# Patient Record
Sex: MALE | Race: WHITE | NOT HISPANIC OR LATINO | Employment: FULL TIME | URBAN - METROPOLITAN AREA
[De-identification: names, ages, dates, MRNs, and addresses within clinical notes are randomized per-mention and may not be internally consistent; named-entity substitution may affect disease eponyms.]

---

## 2017-01-26 ENCOUNTER — OFFICE VISIT (OUTPATIENT)
Dept: INTERNAL MEDICINE | Facility: CLINIC | Age: 47
End: 2017-01-26
Payer: COMMERCIAL

## 2017-01-26 VITALS
RESPIRATION RATE: 16 BRPM | DIASTOLIC BLOOD PRESSURE: 92 MMHG | TEMPERATURE: 97.9 F | HEART RATE: 74 BPM | HEIGHT: 68 IN | WEIGHT: 155 LBS | BODY MASS INDEX: 23.49 KG/M2 | SYSTOLIC BLOOD PRESSURE: 138 MMHG

## 2017-01-26 DIAGNOSIS — I10 ESSENTIAL HYPERTENSION, BENIGN: ICD-10-CM

## 2017-01-26 DIAGNOSIS — Z00.01 ENCOUNTER FOR ROUTINE ADULT MEDICAL EXAM WITH ABNORMAL FINDINGS: Primary | ICD-10-CM

## 2017-01-26 DIAGNOSIS — Z72.0 TOBACCO ABUSE: ICD-10-CM

## 2017-01-26 DIAGNOSIS — F10.21 ALCOHOL DEPENDENCE IN REMISSION (H): ICD-10-CM

## 2017-01-26 DIAGNOSIS — Z13.6 CARDIOVASCULAR SCREENING; LDL GOAL LESS THAN 130: ICD-10-CM

## 2017-01-26 LAB
ANION GAP SERPL CALCULATED.3IONS-SCNC: 8 MMOL/L (ref 3–14)
BUN SERPL-MCNC: 10 MG/DL (ref 7–30)
CALCIUM SERPL-MCNC: 9.9 MG/DL (ref 8.5–10.1)
CHLORIDE SERPL-SCNC: 104 MMOL/L (ref 94–109)
CHOLEST SERPL-MCNC: 232 MG/DL
CO2 SERPL-SCNC: 28 MMOL/L (ref 20–32)
CREAT SERPL-MCNC: 0.82 MG/DL (ref 0.66–1.25)
GFR SERPL CREATININE-BSD FRML MDRD: NORMAL ML/MIN/1.7M2
GLUCOSE SERPL-MCNC: 96 MG/DL (ref 70–99)
HDLC SERPL-MCNC: 53 MG/DL
LDLC SERPL CALC-MCNC: 165 MG/DL
NONHDLC SERPL-MCNC: 179 MG/DL
POTASSIUM SERPL-SCNC: 4.9 MMOL/L (ref 3.4–5.3)
SODIUM SERPL-SCNC: 140 MMOL/L (ref 133–144)
TRIGL SERPL-MCNC: 71 MG/DL

## 2017-01-26 PROCEDURE — 80048 BASIC METABOLIC PNL TOTAL CA: CPT | Performed by: INTERNAL MEDICINE

## 2017-01-26 PROCEDURE — 36415 COLL VENOUS BLD VENIPUNCTURE: CPT | Performed by: INTERNAL MEDICINE

## 2017-01-26 PROCEDURE — 80061 LIPID PANEL: CPT | Performed by: INTERNAL MEDICINE

## 2017-01-26 PROCEDURE — 99213 OFFICE O/P EST LOW 20 MIN: CPT | Mod: 25 | Performed by: INTERNAL MEDICINE

## 2017-01-26 PROCEDURE — 99396 PREV VISIT EST AGE 40-64: CPT | Performed by: INTERNAL MEDICINE

## 2017-01-26 RX ORDER — LISINOPRIL 10 MG/1
10 TABLET ORAL DAILY
Qty: 90 TABLET | Refills: 0 | Status: SHIPPED | OUTPATIENT
Start: 2017-01-26 | End: 2017-06-14

## 2017-01-26 NOTE — PROGRESS NOTES
SUBJECTIVE:     CC: Iain Fuentes is an 46 year old male who presents for preventative health visit.     Healthy Habits:    Do you get at least three servings of calcium containing foods daily (dairy, green leafy vegetables, etc.)? yes and no, taking calcium and/or vitamin D supplement: no    Amount of exercise or daily activities, outside of work: 5 day(s) per week    Problems taking medications regularly No    Medication side effects: No    Have you had an eye exam in the past two years? yes    Do you see a dentist twice per year? no    Do you have sleep apnea, excessive snoring or daytime drowsiness?some snoring        Blood pressure medications - he stopped his lisinopril > 6 mo ago  He no longer drinks though and when he has had this checked his # seem to be borderline but not overtly elevated.     Today's PHQ-2 Score:   PHQ-2 ( 1999 [employer]) 1/26/2017 4/28/2015   Q1: Little interest or pleasure in doing things 0 0   Q2: Feeling down, depressed or hopeless 0 0   PHQ-2 Score 0 0       Abuse: Current or Past(Physical, Sexual or Emotional)- No  Do you feel safe in your environment - Yes    Social History   Substance Use Topics     Smoking status: Current Every Day Smoker -- 1.00 packs/day for 20 years     Types: Cigarettes     Smokeless tobacco: Never Used     Alcohol Use: No     The patient does not drink >3 drinks per day nor >7 drinks per week.    Last PSA: No results found for: PSA    Recent Labs   Lab Test  04/28/15   1128  02/22/13   0947   CHOL  178  213*   HDL  49  55   LDL  115  145*   TRIG  72  65   CHOLHDLRATIO  3.6  3.9       Reviewed orders with patient. Reviewed health maintenance and updated orders accordingly - Yes    All Histories reviewed and updated in Epic.      ROS:  C: NEGATIVE for fever, chills, change in weight  I: NEGATIVE for worrisome rashes, moles or lesions  E: NEGATIVE for vision changes or irritation  ENT: NEGATIVE for ear, mouth and throat problems  R: NEGATIVE for  "significant cough or SOB  CV: NEGATIVE for chest pain, palpitations or peripheral edema  GI: NEGATIVE for nausea, abdominal pain, heartburn, or change in bowel habits   male: negative for dysuria, hematuria, decreased urinary stream, erectile dysfunction, urethral discharge  M: NEGATIVE for significant arthralgias or myalgia  N: NEGATIVE for weakness, dizziness or paresthesias  P: NEGATIVE for changes in mood or affect    Problem list, Medication list, Allergies, and Medical/Social/Surgical histories reviewed in [facility] and updated as appropriate.  OBJECTIVE:     /78 mmHg  Pulse 74  Temp(Src) 97.9  F (36.6  C) (Oral)  Resp 16  Ht 5' 8\" (1.727 m)  Wt 155 lb (70.308 kg)  BMI 23.57 kg/m2  EXAM:  GENERAL: healthy, alert and no distress  EYES: Eyes grossly normal to inspection, PERRL and conjunctivae and sclerae normal  HENT: ear canals and TM's normal, nose and mouth without ulcers or lesions  NECK: no adenopathy, no asymmetry, masses, or scars and thyroid normal to palpation  RESP: lungs clear to auscultation - no rales, rhonchi or wheezes  CV: regular rate and rhythm, normal S1 S2, no S3 or S4, no murmur, click or rub, no peripheral edema and peripheral pulses strong  ABDOMEN: soft, nontender, no hepatosplenomegaly, no masses and bowel sounds normal  MS: no gross musculoskeletal defects noted, no edema  SKIN: scattered acne scars. There is a papilloma like lesion above L eyelid.   NEURO: Normal strength and tone, mentation intact and speech normal  PSYCH: mentation appears normal, affect normal/bright  LYMPH: no cervical, supraclavicular, axillary, or inguinal adenopathy    Results for orders placed or performed in visit on 01/26/17   Basic metabolic panel   Result Value Ref Range    Sodium 140 133 - 144 mmol/L    Potassium 4.9 3.4 - 5.3 mmol/L    Chloride 104 94 - 109 mmol/L    Carbon Dioxide 28 20 - 32 mmol/L    Anion Gap 8 3 - 14 mmol/L    Glucose 96 70 - 99 mg/dL    Urea Nitrogen 10 7 - 30 mg/dL    " "Creatinine 0.82 0.66 - 1.25 mg/dL    GFR Estimate >90  Non  GFR Calc   >60 mL/min/1.7m2    GFR Estimate If Black >90   GFR Calc   >60 mL/min/1.7m2    Calcium 9.9 8.5 - 10.1 mg/dL   Lipid Profile with reflex to direct LDL   Result Value Ref Range    Cholesterol 232 (H) <200 mg/dL    Triglycerides 71 <150 mg/dL    HDL Cholesterol 53 >39 mg/dL    LDL Cholesterol Calculated 165 (H) <100 mg/dL    Non HDL Cholesterol 179 (H) <130 mg/dL      The 10-year ASCVD risk score (Cuauhtemoc LOZADA Jr., et al., 2013) is: 9.2%    Values used to calculate the score:      Age: 46 years      Sex: Male      Is an : No      Diabetic: No      Tobacco smoker: Yes      Systolic Blood Pressure: 138 mmHg      Prescribed Antihypertensives: Yes      HDL Cholesterol: 53 mg/dL      Total Cholesterol: 232 mg/dL     ASSESSMENT/PLAN:     1. Encounter for routine adult medical exam with abnormal findings  Overall seems to be doing better. Focus on tobacco cessation    2. BENIGN HYPERTENSION  Restart medication- try lower dose to start  - lisinopril (PRINIVIL/ZESTRIL) 10 MG tablet; Take 1 tablet (10 mg) by mouth daily  Dispense: 90 tablet; Refill: 0    3. Tobacco abuse  Needs to consider quitting    4. CARDIOVASCULAR SCREENING; LDL GOAL LESS THAN 130  Will discuss risk on f/u - because of his tobacco use his risk is in the 9% range.   - Basic metabolic panel  - Lipid Profile with reflex to direct LDL    5. Alcohol dependence in remission (H)        COUNSELING:  Reviewed preventive health counseling, as reflected in patient instructions         reports that he has been smoking Cigarettes.  He has a 20 pack-year smoking history. He has never used smokeless tobacco.  Tobacco Cessation Action Plan: Information offered: Patient not interested at this time  Estimated body mass index is 23.57 kg/(m^2) as calculated from the following:    Height as of this encounter: 5' 8\" (1.727 m).    Weight as of this encounter: 155 lb " (70.308 kg).       Counseling Resources:  ATP IV Guidelines  Pooled Cohorts Equation Calculator  FRAX Risk Assessment  ICSI Preventive Guidelines  Dietary Guidelines for Americans, 2010  USDA's MyPlate  ASA Prophylaxis  Lung CA Screening    Abdiel Kelly MD  [facility]

## 2017-01-26 NOTE — MR AVS SNAPSHOT
After Visit Summary   1/26/2017    Iain Fuentes    MRN: [id number]           Patient Information     Date Of Birth          1970        Visit Information        Provider Department      1/26/2017 3:00 PM Abdiel Kelly MD [facility]        Today's Diagnoses     Encounter for routine adult medical exam with abnormal findings    -  1     BENIGN HYPERTENSION         Tobacco abuse         CARDIOVASCULAR SCREENING; LDL GOAL LESS THAN 130         Alcohol dependence in remission (H)           Care Instructions      Preventive Health Recommendations  Male Ages 40 to 49    Yearly exam:             See your health care provider every year in order to  o   Review health changes.   o   Discuss preventive care.    o   Review your medicines if your doctor has prescribed any.    You should be tested each year for STDs (sexually transmitted diseases) if you re at risk.     Have a cholesterol test every 5 years.     Have a colonoscopy (test for colon cancer) if someone in your family has had colon cancer or polyps before age 50.     After age 45, have a diabetes test (fasting glucose). If you are at risk for diabetes, you should have this test every 3 years.      Talk with your health care provider about whether or not a prostate cancer screening test (PSA) is right for you.    Shots: Get a flu shot each year. Get a tetanus shot every 10 years.     Nutrition:    Eat at least 5 servings of fruits and vegetables daily.     Eat whole-grain bread, whole-wheat pasta and brown rice instead of white grains and rice.     Talk to your provider about Calcium and Vitamin D.     Lifestyle    Exercise for at least 150 minutes a week (30 minutes a day, 5 days a week). This will help you control your weight and prevent disease.     Limit alcohol to one drink per day.     No smoking.     Wear sunscreen to prevent skin cancer.     See your dentist every six months for an exam and cleaning.       The heart-healthy Mediterranean is a healthy eating plan based on typical foods and recipes of Mediterranean-style cooking. Here's how to adopt the Mediterranean diet.   If you're looking for a heart-healthy eating plan, the Mediterranean diet might be right for you. The Mediterranean diet incorporates the basics of healthy eating -- plus a splash of flavorful olive oil and perhaps even a glass of red wine -- among other components characterizing the traditional cooking style of countries bordering the [place].  Most healthy diets include fruits, vegetables, fish and whole grains, and limit unhealthy fats. While these parts of a healthy diet remain tried-and-true, subtle variations or differences in proportions of certain foods may make a difference in your risk of heart disease.  Research has shown that the traditional Mediterranean diet reduces the risk of heart disease. In fact, an analysis of more than 1.5 million healthy adults demonstrated that following a Mediterranean diet was associated with a reduced risk of death from heart disease and cancer, as well as a reduced incidence of Parkinson's and Alzheimer's diseases.   The Dietary Guidelines for Americans recommends the Mediterranean diet as an eating plan that can help promote health and prevent disease. And the Mediterranean diet is one your whole family can follow for good health.   The Mediterranean diet emphasizes:  Eating primarily plant-based foods, such as fruits and vegetables, whole grains, legumes and nuts   Replacing butter with healthy fats, such as olive oil   Using herbs and spices instead of salt to flavor foods   Limiting red meat to no more than a few times a month   Eating fish and poultry at least twice a week   Drinking red wine in moderation (optional)  The diet also recognizes the importance of being physically active, and enjoying meals with family and friends.  The Mediterranean diet traditionally includes fruits,  "vegetables and grains. For example, residents of [facility] average six or more servings a day of antioxidant-rich fruits and vegetables.  Grains in the Mediterranean region are typically whole grain and usually contain very few unhealthy trans fats, and bread is an important part of the diet. However, throughout the Mediterranean region, bread is eaten plain or dipped in olive oil -- not eaten with butter or margarine, which contains saturated or trans fats.   Nuts are another part of a healthy Mediterranean diet. Nuts are high in fat, but most of the fat is healthy. Because nuts are high in calories, they should not be eaten in large amounts -- generally no more than a handful a day. For the best nutrition, avoid candied or honey-roasted and heavily salted nuts.  The focus of the Mediterranean diet isn't on limiting total fat consumption, but rather on choosing healthier types of fat. The Mediterranean diet discourages saturated fats and hydrogenated oils (trans fats), both of which contribute to heart disease.  The Mediterranean diet features olive oil as the primary source of fat. Olive oil is mainly monounsaturated fat -- a type of fat that can help reduce low-density lipoprotein (LDL) cholesterol levels when used in place of saturated or trans fats. \"Extra-virgin\" and \"virgin\" olive oils (the least processed forms) also contain the highest levels of protective plant compounds that provide antioxidant effects.  Canola oil and some nuts contain the beneficial linolenic acid (a type of omega-3 fatty acid) in addition to healthy unsaturated fat. Omega-3 fatty acids lower triglycerides, decrease blood clotting, and are associated with decreased incidence of sudden heart attacks, improve the health of your blood vessels, and help moderate blood pressure. Fatty fish -- such as mackerel, lake trout, herring, sardines, albacore tuna and salmon -- are rich sources of omega-3 fatty acids. Fish is eaten on a regular basis in " the Mediterranean diet.  The health effects of alcohol have been debated for many years, and some doctors are reluctant to encourage alcohol consumption because of the health consequences of excessive drinking. However, alcohol -- in moderation -- has been associated with a reduced risk of heart disease in some research studies.  The Mediterranean diet typically includes a moderate amount of wine, usually red wine. This means no more than 5 ounces (148 milliliters) of wine daily for women of all ages and men older than age 65 and no more than 10 ounces (296 milliliters) of wine daily for younger men. More than this may increase the risk of health problems, including increased risk of certain types of cancer.   If you're unable to limit your alcohol intake to the amounts defined above, if you have a personal or family history of alcohol abuse, or if you have heart or liver disease, refrain from drinking wine or any other alcohol.  The Mediterranean diet is a delicious and healthy way to eat. Many people who switch to this style of eating say they'll never eat any other way. Here are some specific steps to get you started:   Eat your veggies and fruits -- and switch to whole grains. Avariety of plant foods should make up the majority of your meals. They should be minimally processed -- fresh and whole are best. Include veggies and fruits in every meal and eat them for snacks as well. Switch to whole-grain bread and cereal, and begin to eat more whole-grain rice and pasta products. Keep baby carrots, apples and bananas on hand for quick, satisfying snacks. Fruit salads are a wonderful way to eat a variety of healthy fruit.   Go nuts. Nuts and seeds are good sources of fiber, protein and healthy fats. Keep almonds, cashews, pistachios and walnuts on hand for a quick snack. Choose natural peanut butter, rather than the kind with hydrogenated fat added. Try blended sesame seeds (tahini) as a dip or spread for bread.    Pass on the butter. Try olive or canola oil as a healthy replacement for butter or margarine. Lightly drizzle it over vegetables. After cooking pasta, add a touch of olive oil, some garlic and green onions for flavoring. Dip bread in flavored olive oil or lightly spread it on whole-grain bread for a tasty alternative to butter. Try tahini as a dip or spread for bread too.   Spice it up. Herbs and spices make food tasty and can  for salt and fat in recipes.   Go fish. Eat fish at least twice a week. Fresh or water-packed tuna, salmon, trout, mackerel and herring are healthy choices. [place], bake or broil fish for great taste and easy cleanup. Avoid breaded and fried fish.   Rein in the red meat. Limit red meat to no more than a few times a month. Substitute fish and poultry for red meat. When choosing red meat, make sure it's lean and keep portions small (about the size of a deck of cards). Also avoid sausage, neil and other high-fat, processed meats.   Choose low-fat dairy. Limit higher fat dairy products, such as whole or 2 percent milk, cheese and ice cream. Switch to skim milk, fat-free yogurt and low-fat cheese          Follow-ups after your visit        Who to contact     If you have questions or need follow up information about today's clinic visit or your schedule please contact [facility] directly at [phone].  Normal or non-critical lab and imaging results will be communicated to you by MyChart, letter or phone within 4 business days after the clinic has received the results. If you do not hear from us within 7 days, please contact the clinic through [employer] or phone. If you have a critical or abnormal lab result, we will notify you by phone as soon as possible.  Submit refill requests through [employer] or call your pharmacy and they will forward the refill request to us. Please allow 3 business days for your refill to be completed.          Additional Information About  "Your Visit        [employer] Information     [employer] lets you send messages to your doctor, view your test results, renew your prescriptions, schedule appointments and more. To sign up, go to [email or web address] . Click on \"Log in\" on the left side of the screen, which will take you to the Welcome page. Then click on \"Sign up Now\" on the right side of the page.     You will be asked to enter the access code listed below, as well as some personal information. Please follow the directions to create your username and password.     Your access code is: [email or web address]  Expires: 2017  3:34 PM     Your access code will  in 90 days. If you need help or a new code, please call your [facility] clinic or [phone].        Care EveryWhere ID     This is your Care EveryWhere ID. This could be used by other organizations to access your [facility] medical records  [id number]        Your Vitals Were     Pulse Temperature Respirations Height BMI (Body Mass Index)       74 97.9  F (36.6  C) (Oral) 16 5' 8\" (1.727 m) 23.57 kg/m2        Blood Pressure from Last 3 Encounters:   17 138/92   04/28/15 130/82   02/13/15 152/100    Weight from Last 3 Encounters:   17 155 lb (70.308 kg)   04/28/15 155 lb (70.308 kg)   02/13/15 158 lb 11.2 oz (71.986 kg)              We Performed the Following     Basic metabolic panel     Lipid Profile with reflex to direct LDL          Today's Medication Changes          These changes are accurate as of: 17  3:34 PM.  If you have any questions, ask your nurse or doctor.               These medicines have changed or have updated prescriptions.        Dose/Directions    lisinopril 10 MG tablet   Commonly known as:  PRINIVIL/ZESTRIL   This may have changed:    - medication strength  - how much to take   Used for:  Essential hypertension, benign   Changed by:  Abdiel Kelly MD        Dose:  10 mg   Take 1 tablet (10 mg) by mouth daily   Quantity:  90 tablet   Refills:  0       "      Where to get your medicines      These medications were sent to [id number] - [place], [place] - [address]  [address], [facility] [address]     Phone:  [phone]    - lisinopril 10 MG tablet             Primary Care Provider Office Phone # Fax #    Abdiel Kelly [phone]       [facility] [address]  [facility] [address]        Thank you!     Thank you for choosing [facility]  for your care. Our goal is always to provide you with excellent care. Hearing back from our patients is one way we can continue to improve our services. Please take a few minutes to complete the written survey that you may receive in the mail after your visit with us. Thank you!             Your Updated Medication List - Protect others around you: Learn how to safely use, store and throw away your medicines at www.disposemymeds.org.          This list is accurate as of: 1/26/17  3:34 PM.  Always use your most recent med list.                   Brand Name Dispense Instructions for use    lisinopril 10 MG tablet    PRINIVIL/ZESTRIL    90 tablet    Take 1 tablet (10 mg) by mouth daily

## 2017-01-26 NOTE — PATIENT INSTRUCTIONS
Preventive Health Recommendations  Male Ages 40 to 49    Yearly exam:             See your health care provider every year in order to  o   Review health changes.   o   Discuss preventive care.    o   Review your medicines if your doctor has prescribed any.    You should be tested each year for STDs (sexually transmitted diseases) if you re at risk.     Have a cholesterol test every 5 years.     Have a colonoscopy (test for colon cancer) if someone in your family has had colon cancer or polyps before age 50.     After age 45, have a diabetes test (fasting glucose). If you are at risk for diabetes, you should have this test every 3 years.      Talk with your health care provider about whether or not a prostate cancer screening test (PSA) is right for you.    Shots: Get a flu shot each year. Get a tetanus shot every 10 years.     Nutrition:    Eat at least 5 servings of fruits and vegetables daily.     Eat whole-grain bread, whole-wheat pasta and brown rice instead of white grains and rice.     Talk to your provider about Calcium and Vitamin D.     Lifestyle    Exercise for at least 150 minutes a week (30 minutes a day, 5 days a week). This will help you control your weight and prevent disease.     Limit alcohol to one drink per day.     No smoking.     Wear sunscreen to prevent skin cancer.     See your dentist every six months for an exam and cleaning.      The heart-healthy Mediterranean is a healthy eating plan based on typical foods and recipes of Mediterranean-style cooking. Here's how to adopt the Mediterranean diet.   If you're looking for a heart-healthy eating plan, the Mediterranean diet might be right for you. The Mediterranean diet incorporates the basics of healthy eating   plus a splash of flavorful olive oil and perhaps even a glass of red wine   among other components characterizing the traditional cooking style of countries bordering the [place].  Most healthy diets include fruits,  vegetables, fish and whole grains, and limit unhealthy fats. While these parts of a healthy diet remain tried-and-true, subtle variations or differences in proportions of certain foods may make a difference in your risk of heart disease.  Research has shown that the traditional Mediterranean diet reduces the risk of heart disease. In fact, an analysis of more than 1.5 million healthy adults demonstrated that following a Mediterranean diet was associated with a reduced risk of death from heart disease and cancer, as well as a reduced incidence of Parkinson's and Alzheimer's diseases.   The Dietary Guidelines for Americans recommends the Mediterranean diet as an eating plan that can help promote health and prevent disease. And the Mediterranean diet is one your whole family can follow for good health.   The Mediterranean diet emphasizes:  Eating primarily plant-based foods, such as fruits and vegetables, whole grains, legumes and nuts   Replacing butter with healthy fats, such as olive oil   Using herbs and spices instead of salt to flavor foods   Limiting red meat to no more than a few times a month   Eating fish and poultry at least twice a week   Drinking red wine in moderation (optional)  The diet also recognizes the importance of being physically active, and enjoying meals with family and friends.  The Mediterranean diet traditionally includes fruits, vegetables and grains. For example, residents of [facility] average six or more servings a day of antioxidant-rich fruits and vegetables.  Grains in the Mediterranean region are typically whole grain and usually contain very few unhealthy trans fats, and bread is an important part of the diet. However, throughout the Mediterranean region, bread is eaten plain or dipped in olive oil   not eaten with butter or margarine, which contains saturated or trans fats.   Nuts are another part of a healthy Mediterranean diet. Nuts are high in fat, but most of the fat is healthy.  "Because nuts are high in calories, they should not be eaten in large amounts   generally no more than a handful a day. For the best nutrition, avoid candied or honey-roasted and heavily salted nuts.  The focus of the Mediterranean diet isn't on limiting total fat consumption, but rather on choosing healthier types of fat. The Mediterranean diet discourages saturated fats and hydrogenated oils (trans fats), both of which contribute to heart disease.  The Mediterranean diet features olive oil as the primary source of fat. Olive oil is mainly monounsaturated fat   a type of fat that can help reduce low-density lipoprotein (LDL) cholesterol levels when used in place of saturated or trans fats. \"Extra-virgin\" and \"virgin\" olive oils (the least processed forms) also contain the highest levels of protective plant compounds that provide antioxidant effects.  Canola oil and some nuts contain the beneficial linolenic acid (a type of omega-3 fatty acid) in addition to healthy unsaturated fat. Omega-3 fatty acids lower triglycerides, decrease blood clotting, and are associated with decreased incidence of sudden heart attacks, improve the health of your blood vessels, and help moderate blood pressure. Fatty fish   such as mackerel, lake trout, herring, sardines, albacore tuna and salmon   are rich sources of omega-3 fatty acids. Fish is eaten on a regular basis in the Mediterranean diet.  The health effects of alcohol have been debated for many years, and some doctors are reluctant to encourage alcohol consumption because of the health consequences of excessive drinking. However, alcohol   in moderation   has been associated with a reduced risk of heart disease in some research studies.  The Mediterranean diet typically includes a moderate amount of wine, usually red wine. This means no more than 5 ounces (148 milliliters) of wine daily for women of all ages and men older than age 65 and no more than 10 ounces (296 milliliters) " of wine daily for younger men. More than this may increase the risk of health problems, including increased risk of certain types of cancer.   If you're unable to limit your alcohol intake to the amounts defined above, if you have a personal or family history of alcohol abuse, or if you have heart or liver disease, refrain from drinking wine or any other alcohol.  The Mediterranean diet is a delicious and healthy way to eat. Many people who switch to this style of eating say they'll never eat any other way. Here are some specific steps to get you started:   Eat your veggies and fruits   and switch to whole grains. [place] of plant foods should make up the majority of your meals. They should be minimally processed   fresh and whole are best. Include veggies and fruits in every meal and eat them for snacks as well. Switch to whole-grain bread and cereal, and begin to eat more whole-grain rice and pasta products. Keep baby carrots, apples and bananas on hand for quick, satisfying snacks. Fruit salads are a wonderful way to eat a variety of healthy fruit.   Go nuts. Nuts and seeds are good sources of fiber, protein and healthy fats. Keep almonds, cashews, pistachios and walnuts on hand for a quick snack. Choose natural peanut butter, rather than the kind with hydrogenated fat added. Try blended sesame seeds (tahini) as a dip or spread for bread.   Pass on the butter. Try olive or canola oil as a healthy replacement for butter or margarine. Lightly drizzle it over vegetables. After cooking pasta, add a touch of olive oil, some garlic and green onions for flavoring. Dip bread in flavored olive oil or lightly spread it on whole-grain bread for a tasty alternative to butter. Try tahini as a dip or spread for bread too.   Spice it up. Herbs and spices make food tasty and can  for salt and fat in recipes.   Go fish. Eat fish at least twice a week. Fresh or water-packed tuna, salmon, trout, mackerel and herring are  healthy choices. [place], bake or broil fish for great taste and easy cleanup. Avoid breaded and fried fish.   Rein in the red meat. Limit red meat to no more than a few times a month. Substitute fish and poultry for red meat. When choosing red meat, make sure it's lean and keep portions small (about the size of a deck of cards). Also avoid sausage, neil and other high-fat, processed meats.   Choose low-fat dairy. Limit higher fat dairy products, such as whole or 2 percent milk, cheese and ice cream. Switch to skim milk, fat-free yogurt and low-fat cheese

## 2017-01-26 NOTE — NURSING NOTE
"Chief Complaint   Patient presents with     Physical       Initial /78 mmHg  Pulse 74  Temp(Src) 97.9  F (36.6  C) (Oral)  Resp 16  Ht 5' 8\" (1.727 m)  Wt 155 lb (70.308 kg)  BMI 23.57 kg/m2 Estimated body mass index is 23.57 kg/(m^2) as calculated from the following:    Height as of this encounter: 5' 8\" (1.727 m).    Weight as of this encounter: 155 lb (70.308 kg).  BP completed using cuff size: zabrina Noyola MA      "

## 2017-06-14 ENCOUNTER — OFFICE VISIT (OUTPATIENT)
Dept: INTERNAL MEDICINE | Facility: CLINIC | Age: 47
End: 2017-06-14
Payer: COMMERCIAL

## 2017-06-14 VITALS
SYSTOLIC BLOOD PRESSURE: 130 MMHG | WEIGHT: 152 LBS | OXYGEN SATURATION: 97 % | DIASTOLIC BLOOD PRESSURE: 82 MMHG | BODY MASS INDEX: 23.11 KG/M2 | TEMPERATURE: 97.8 F | HEART RATE: 73 BPM

## 2017-06-14 DIAGNOSIS — N52.9 IMPOTENCE OF ORGANIC ORIGIN: ICD-10-CM

## 2017-06-14 DIAGNOSIS — I10 ESSENTIAL HYPERTENSION, BENIGN: Primary | ICD-10-CM

## 2017-06-14 DIAGNOSIS — Z72.0 TOBACCO ABUSE: ICD-10-CM

## 2017-06-14 PROCEDURE — 99214 OFFICE O/P EST MOD 30 MIN: CPT | Performed by: INTERNAL MEDICINE

## 2017-06-14 RX ORDER — TADALAFIL 20 MG/1
10-20 TABLET ORAL DAILY PRN
Qty: 6 TABLET | Refills: 11 | Status: SHIPPED | OUTPATIENT
Start: 2017-06-14 | End: 2017-06-14

## 2017-06-14 RX ORDER — TADALAFIL 20 MG/1
10-20 TABLET ORAL DAILY PRN
Qty: 6 TABLET | Refills: 11 | Status: SHIPPED | OUTPATIENT
Start: 2017-06-14 | End: 2018-08-06

## 2017-06-14 NOTE — NURSING NOTE
"Chief Complaint   Patient presents with     Hypertension       Initial /90  Pulse 73  Temp 97.8  F (36.6  C) (Oral)  Wt 152 lb (68.9 kg)  SpO2 97%  BMI 23.11 kg/m2 Estimated body mass index is 23.11 kg/(m^2) as calculated from the following:    Height as of 1/26/17: 5' 8\" (1.727 m).    Weight as of this encounter: 152 lb (68.9 kg).  Medication Reconciliation: complete     Diane Melissa, DINESH       "

## 2017-06-14 NOTE — MR AVS SNAPSHOT
"              After Visit Summary   2017    Iain Fuentes    MRN: [id number]           Patient Information     Date Of Birth          1970        Visit Information        Provider Department      2017 7:40 AM Abdiel Kelly MD [facility]        Today's Diagnoses     Essential hypertension, benign    -  1    Tobacco abuse        Impotence of organic origin           Follow-ups after your visit        Who to contact     If you have questions or need follow up information about today's clinic visit or your schedule please contact [facility] directly at [phone].  Normal or non-critical lab and imaging results will be communicated to you by MyChart, letter or phone within 4 business days after the clinic has received the results. If you do not hear from us within 7 days, please contact the clinic through [employer] or phone. If you have a critical or abnormal lab result, we will notify you by phone as soon as possible.  Submit refill requests through [employer] or call your pharmacy and they will forward the refill request to us. Please allow 3 business days for your refill to be completed.          Additional Information About Your Visit        MyChart Information     [employer] lets you send messages to your doctor, view your test results, renew your prescriptions, schedule appointments and more. To sign up, go to [email or web address] . Click on \"Log in\" on the left side of the screen, which will take you to the Welcome page. Then click on \"Sign up Now\" on the right side of the page.     You will be asked to enter the access code listed below, as well as some personal information. Please follow the directions to create your username and password.     Your access code is: [email or web address]  Expires: 2017  8:23 AM     Your access code will  in 90 days. If you need help or a new code, please call your [facility] or [phone].      "   Care EveryWhere ID     This is your Care EveryWhere ID. This could be used by other organizations to access your [facility] medical records  [id number]        Your Vitals Were     Pulse Temperature Pulse Oximetry BMI (Body Mass Index)          73 97.8  F (36.6  C) (Oral) 97% 23.11 kg/m2         Blood Pressure from Last 3 Encounters:   06/14/17 130/82   01/26/17 (!) 138/92   04/28/15 130/82    Weight from Last 3 Encounters:   06/14/17 152 lb (68.9 kg)   01/26/17 155 lb (70.3 kg)   04/28/15 155 lb (70.3 kg)              Today, you had the following     No orders found for display         Today's Medication Changes          These changes are accurate as of: 6/14/17  8:23 AM.  If you have any questions, ask your nurse or doctor.               Start taking these medicines.        Dose/Directions    tadalafil 20 MG tablet   Commonly known as:  CIALIS   Used for:  Impotence of organic origin   Started by:  Abdiel Kelly MD        Dose:  10-20 mg   Take 0.5-1 tablets (10-20 mg) by mouth daily as needed for erectile dysfunction Never use with nitroglycerin, terazosin or doxazosin.   Quantity:  6 tablet   Refills:  11         Stop taking these medicines if you haven't already. Please contact your care team if you have questions.     lisinopril 10 MG tablet   Commonly known as:  PRINIVIL/ZESTRIL   Stopped by:  Abdiel Kelly MD                Where to get your medicines      These medications were sent to [id number] - [facility][1] [address]   [address] [facility] [address]     Phone:  [phone]     tadalafil 20 MG tablet                Primary Care Provider Office Phone # Fax #    Abdiel Kelly [phone]       [facility] [address]  [facility] [address]        Thank you!     Thank you for choosing [facility]  for your care. Our goal is always to provide you with excellent care. Hearing back from our patients  is one way we can continue to improve our services. Please take a few minutes to complete the written survey that you may receive in the mail after your visit with us. Thank you!             Your Updated Medication List - Protect others around you: Learn how to safely use, store and throw away your medicines at www.disposemymeds.org.          This list is accurate as of: 6/14/17  8:23 AM.  Always use your most recent med list.                   Brand Name Dispense Instructions for use    tadalafil 20 MG tablet    CIALIS    6 tablet    Take 0.5-1 tablets (10-20 mg) by mouth daily as needed for erectile dysfunction Never use with nitroglycerin, terazosin or doxazosin.

## 2017-06-14 NOTE — PROGRESS NOTES
SUBJECTIVE:                                                    Iain Fuentes is a 46 year old male who presents to clinic today for the following health issues:    Hasn't been taking his mediation for past 4-8 weeks  Also remains sober.   Hypertension Follow-up  BP Readings from Last 3 Encounters:   06/14/17 142/90   01/26/17 (!) 138/92   04/28/15 130/82        Outpatient blood pressures are not being checked.    Low Salt Diet: no added salt       Amount of exercise or physical activity: 2-3 days/week for an average of 30-45 minutes    Problems taking medications regularly: No    Medication side effects: erectile dysfunction     Diet: regular (no restrictions)    Impotence    Problem list and histories reviewed & adjusted, as indicated.  Additional history: as documented    Labs reviewed in EPIC    Reviewed and updated as needed this visit by clinical staff       Reviewed and updated as needed this visit by Provider            ROS:  Constitutional, HEENT, cardiovascular, pulmonary, gi and gu systems are negative, except as otherwise noted.    OBJECTIVE:                                                    /82  Pulse 73  Temp 97.8  F (36.6  C) (Oral)  Wt 152 lb (68.9 kg)  SpO2 97%  BMI 23.11 kg/m2  Body mass index is 23.11 kg/(m^2).  GENERAL APPEARANCE: alert and no distress  RESP: lungs clear to auscultation - no rales, rhonchi or wheezes  CV: regular rates and rhythm, normal S1 S2, no S3 or S4 and no murmur, click or rub    Diagnostic test results:  none      ASSESSMENT/PLAN:                                                    1. Essential hypertension, benign  Based on # here he may in fact not need medication now that he has remained sober  Will monitor off meds- recheck in pharmacy monthly.     2. Impotence of organic origin  - tadalafil (CIALIS) 20 MG tablet; Take 0.5-1 tablets (10-20 mg) by mouth daily as needed for erectile dysfunction Never use with nitroglycerin, terazosin or doxazosin.   Dispense: 6 tablet; Refill: 11      Follow up with Provider - as above     Abdiel Kelly MD  [facility]

## 2018-01-29 ENCOUNTER — OFFICE VISIT (OUTPATIENT)
Dept: URGENT CARE | Facility: URGENT CARE | Age: 48
End: 2018-01-29
Payer: OTHER MISCELLANEOUS

## 2018-01-29 ENCOUNTER — RADIANT APPOINTMENT (OUTPATIENT)
Dept: GENERAL RADIOLOGY | Facility: CLINIC | Age: 48
End: 2018-01-29
Attending: PHYSICIAN ASSISTANT
Payer: OTHER MISCELLANEOUS

## 2018-01-29 VITALS
WEIGHT: 157.2 LBS | BODY MASS INDEX: 23.9 KG/M2 | DIASTOLIC BLOOD PRESSURE: 104 MMHG | SYSTOLIC BLOOD PRESSURE: 169 MMHG | RESPIRATION RATE: 18 BRPM | TEMPERATURE: 97 F | HEART RATE: 64 BPM

## 2018-01-29 DIAGNOSIS — Z87.39 HISTORY OF HERNIATED INTERVERTEBRAL DISC: ICD-10-CM

## 2018-01-29 DIAGNOSIS — M54.6 ACUTE BILATERAL THORACIC BACK PAIN: ICD-10-CM

## 2018-01-29 DIAGNOSIS — M62.830 BACK MUSCLE SPASM: ICD-10-CM

## 2018-01-29 DIAGNOSIS — M54.6 ACUTE BILATERAL THORACIC BACK PAIN: Primary | ICD-10-CM

## 2018-01-29 PROCEDURE — 72070 X-RAY EXAM THORAC SPINE 2VWS: CPT | Mod: FY

## 2018-01-29 PROCEDURE — 99214 OFFICE O/P EST MOD 30 MIN: CPT | Performed by: PHYSICIAN ASSISTANT

## 2018-01-29 RX ORDER — METHYLPREDNISOLONE 4 MG
TABLET, DOSE PACK ORAL
Qty: 21 TABLET | Refills: 0 | Status: SHIPPED | OUTPATIENT
Start: 2018-01-29 | End: 2018-06-01

## 2018-01-29 RX ORDER — METHOCARBAMOL 750 MG/1
750 TABLET, FILM COATED ORAL 3 TIMES DAILY PRN
Qty: 30 TABLET | Refills: 0 | Status: SHIPPED | OUTPATIENT
Start: 2018-01-29 | End: 2018-06-01

## 2018-01-29 NOTE — PROGRESS NOTES
SUBJECTIVE  HPI: Iain Fuentes is a 47 year old male who presents for evaluation of back pain, mid to lower  Symptoms began today  ago, have been onset gradual and are worse.  Pain is located in the middle of back bilateral region, with radiation to does not radiate, and are at worst a 4 on a scale of 1-10.  Recent injury:turning/bending   Personal hx of back pain is previous herniated disc.  Pain is exacerbated by: certain movements.  Pain is relieved by: rest   Associated sx include: spasms.  Red flag symptoms: negative, fever, chills, night sweats.    Past Medical History:   Diagnosis Date     Alcohol abuse      Hypertension      Lumbar herniated disc      Marijuana dependence (H)      ALLERGIES  No Known Allergies     Social History   Substance Use Topics     Smoking status: Current Every Day Smoker     Packs/day: 1.00     Years: 20.00     Types: Cigarettes     Smokeless tobacco: Never Used     Alcohol use No       ROS:  CONSTITUTIONAL:NEGATIVE for fever, chills, change in weight  INTEGUMENTARY/SKIN: NEGATIVE for worrisome rashes, moles or lesions  RESP: NEGATIVE for SOB  CV: NEGATIVE for chest pain, pressure  EXT: NEGATIVE for radicular pain into legs   MUSCULOSKELETAL: POSITIVE  for mid to lower back pain, tenderness, spasms  NEURO: NEGATIVE for weakness, dizziness or paresthesias    OBJECTIVE:  BP (!) 169/104  Pulse 64  Temp 97  F (36.1  C) (Oral)  Resp 18  Wt 157 lb 3.2 oz (71.3 kg)  BMI 23.9 kg/m2  Back examination: Back symmetric, no curvature. ROM normal. No CVA tenderness., positive findings: paraspinal muscle spasm, tenderness to palpation mid to lower spine  STRAIGHT leg raise test: negative  GENERAL APPEARANCE: healthy, alert and no distress  RESP: lungs clear to auscultation - no rales, rhonchi or wheezes  CV: regular rates and rhythm, normal S1 S2, no murmur noted  ABDOMEN:  soft, nontender, no HSM or masses and bowel sounds normal  NEURO: Normal strength and tone with no weakness or  sensory deficit noted, reflexes normal   Extremities: no peripheral edema or tenderness, peripheral pulses normal  MS:  Positive for mid to lower back pain, spasms   SKIN: no suspicious lesions or rashes    Thoracic xray Negative for acute findings, read by Gucci AHUJA at time of visit.    ASSESSMENT/IMPRESSION/PLAN:    ICD-10-CM    1. Acute bilateral thoracic back pain M54.6 methylPREDNISolone (MEDROL DOSEPAK) 4 MG tablet     CANCELED: XR Thoracic Spine G/E 4 Views   2. Back muscle spasm M62.830 methocarbamol (ROBAXIN) 750 MG tablet   3. History of herniated intervertebral disc Z87.39        EDUCATION      1.  Continue stretching and strengthening exercises.       2.  Continue prn heat or ice application.    Orders Placed This Encounter     Ibuprofen (ADVIL PO)     methocarbamol (ROBAXIN) 750 MG tablet     methylPREDNISolone (MEDROL DOSEPAK) 4 MG tablet

## 2018-01-29 NOTE — LETTER
[address] [address]  [phone]        2018    REPORT OF WORK ABILITY    PATIENT DATA  Employee Name: Iain Fuentes        : 1970   [id number]  Work related injury: YES  Today's date: 2018  Date of injury: 2018     PROVIDER EVALUATION: Please fill in as needed.  Please give copy to employee for employer.  1. Diagnosis: mid back pain, strain, spasms  2. Treatment: medication, ice, ROM exercises  3. Medication: medrol and robaxin  NOTE: When ordering a medication, [place] Rules require Work Comp or WC on prescriptions.  4. Return to work date: May return today with no restrictions.      RESTRICTIONS: Unlimited unless listed.  Restrictions apply to home and leisure also.  If work within restrictions is not available, the employee is totally disabled.  Provider comments:   Medical Examiner: Gucci Fragoso [facility]  License or registration:     Next appointment: As needed    CC: Employer, Managed Care Plan/Payor, Patient

## 2018-01-29 NOTE — MR AVS SNAPSHOT
"              After Visit Summary   2018    Iain Fuentes    MRN: [id number]           Patient Information     Date Of Birth          1970        Visit Information        Provider Department      2018 10:55 AM Gucci Fragoso PA-C [facility]        Today's Diagnoses     Acute bilateral thoracic back pain    -  1    Back muscle spasm        History of herniated intervertebral disc           Follow-ups after your visit        Who to contact     If you have questions or need follow up information about today's clinic visit or your schedule please contact [facility] directly at [phone].  Normal or non-critical lab and imaging results will be communicated to you by [employer], letter or phone within 4 business days after the clinic has received the results. If you do not hear from us within 7 days, please contact the clinic through [employer] or phone. If you have a critical or abnormal lab result, we will notify you by phone as soon as possible.  Submit refill requests through [employer] or call your pharmacy and they will forward the refill request to us. Please allow 3 business days for your refill to be completed.          Additional Information About Your Visit        MyChart Information     [employer]ctin lets you send messages to your doctor, view your test results, renew your prescriptions, schedule appointments and more. To sign up, go to [email or web address] . Click on \"Log in\" on the left side of the screen, which will take you to the Welcome page. Then click on \"Sign up Now\" on the right side of the page.     You will be asked to enter the access code listed below, as well as some personal information. Please follow the directions to create your username and password.     Your access code is: [email or web address]  Expires: 2018 12:38 PM     Your access code will  in 90 days. If you need help or a new code, please call your [place] " clinic or [phone].        Care EveryWhere ID     This is your Care EveryWhere ID. This could be used by other organizations to access your [facility] medical records  [id number]        Your Vitals Were     Pulse Temperature Respirations BMI (Body Mass Index)          64 97  F (36.1  C) (Oral) 18 23.9 kg/m2         Blood Pressure from Last 3 Encounters:   01/29/18 (!) 169/104   06/14/17 130/82   01/26/17 (!) 138/92    Weight from Last 3 Encounters:   01/29/18 157 lb 3.2 oz (71.3 kg)   06/14/17 152 lb (68.9 kg)   01/26/17 155 lb (70.3 kg)              Today, you had the following     No orders found for display         Today's Medication Changes          These changes are accurate as of 1/29/18 12:38 PM.  If you have any questions, ask your nurse or doctor.               Start taking these medicines.        Dose/Directions    methocarbamol 750 MG tablet   Commonly known as:  ROBAXIN   Used for:  Back muscle spasm   Started by:  Gucci Fragoso PA-C        Dose:  750 mg   Take 1 tablet (750 mg) by mouth 3 times daily as needed for muscle spasms   Quantity:  30 tablet   Refills:  0       methylPREDNISolone 4 MG tablet   Commonly known as:  MEDROL DOSEPAK   Used for:  Acute bilateral thoracic back pain   Started by:  Gucci Fragoso PA-C        Follow package instructions   Quantity:  21 tablet   Refills:  0            Where to get your medicines      These medications were sent to [facility] [address] [address]     Phone:  [phone]     methocarbamol 750 MG tablet    methylPREDNISolone 4 MG tablet                Primary Care Provider Office Phone # Fax #    Abdiel Kelly [phone]       [address] [place] [address]        Equal Access to Services     DULCE KOWALSKI AH: Chris chance Sosabrina, waaxda luqadaha, qaybta kaalmada adeegyaarjun, alba abbasi. So [facility]  [phone].    ATENCIÓN: Si aguilar bone, tiene a reyes disposición servicios gratuitos de asistencia lingüística. Pavan valentine [phone].    We comply with applicable federal civil rights laws and [place] laws. We do not discriminate on the basis of race, color, national origin, age, disability, sex, sexual orientation, or gender identity.            Thank you!     Thank you for choosing [facility]  for your care. Our goal is always to provide you with excellent care. Hearing back from our patients is one way we can continue to improve our services. Please take a few minutes to complete the written survey that you may receive in the mail after your visit with us. Thank you!             Your Updated Medication List - Protect others around you: Learn how to safely use, store and throw away your medicines at www.disposemymeds.org.          This list is accurate as of 1/29/18 12:38 PM.  Always use your most recent med list.                   Brand Name Dispense Instructions for use Diagnosis    ADVIL PO           methocarbamol 750 MG tablet    ROBAXIN    30 tablet    Take 1 tablet (750 mg) by mouth 3 times daily as needed for muscle spasms    Back muscle spasm       methylPREDNISolone 4 MG tablet    MEDROL DOSEPAK    21 tablet    Follow package instructions    Acute bilateral thoracic back pain       tadalafil 20 MG tablet    CIALIS    6 tablet    Take 0.5-1 tablets (10-20 mg) by mouth daily as needed for erectile dysfunction Never use with nitroglycerin, terazosin or doxazosin.    Impotence of organic origin

## 2018-06-01 ENCOUNTER — OFFICE VISIT (OUTPATIENT)
Dept: INTERNAL MEDICINE | Facility: CLINIC | Age: 48
End: 2018-06-01
Payer: COMMERCIAL

## 2018-06-01 VITALS
WEIGHT: 152.5 LBS | BODY MASS INDEX: 23.11 KG/M2 | RESPIRATION RATE: 16 BRPM | SYSTOLIC BLOOD PRESSURE: 138 MMHG | TEMPERATURE: 98 F | HEART RATE: 76 BPM | DIASTOLIC BLOOD PRESSURE: 100 MMHG | HEIGHT: 68 IN

## 2018-06-01 DIAGNOSIS — I10 BENIGN ESSENTIAL HYPERTENSION: Primary | ICD-10-CM

## 2018-06-01 PROCEDURE — 99214 OFFICE O/P EST MOD 30 MIN: CPT | Performed by: PHYSICIAN ASSISTANT

## 2018-06-01 RX ORDER — LISINOPRIL 10 MG/1
10 TABLET ORAL DAILY
Qty: 30 TABLET | Refills: 1 | Status: SHIPPED | OUTPATIENT
Start: 2018-06-01 | End: 2018-08-06

## 2018-06-01 ASSESSMENT — PAIN SCALES - GENERAL: PAINLEVEL: NO PAIN (0)

## 2018-06-01 NOTE — MR AVS SNAPSHOT
"              After Visit Summary   6/1/2018    Iain Fuentes    MRN: [id number]           Patient Information     Date Of Birth          1970        Visit Information        Provider Department      6/1/2018 3:40 PM Melanie Denney PA-C [facility]        Today's Diagnoses     Benign essential hypertension    -  1      Care Instructions    Check blood pressure a few times a week and bring with to appointment with Dr Kelly.     Option to have pharmacist at [facility] pharmacy check your blood pressure too.               Follow-ups after your visit        Who to contact     If you have questions or need follow up information about today's clinic visit or your schedule please contact [facility] directly at [phone].  Normal or non-critical lab and imaging results will be communicated to you by MyChart, letter or phone within 4 business days after the clinic has received the results. If you do not hear from us within 7 days, please contact the clinic through MyChart or phone. If you have a critical or abnormal lab result, we will notify you by phone as soon as possible.  Submit refill requests through [employer] or call your pharmacy and they will forward the refill request to us. Please allow 3 business days for your refill to be completed.          Additional Information About Your Visit        Care EveryWhere ID     This is your Care EveryWhere ID. This could be used by other organizations to access your [facility] medical records  [id number]        Your Vitals Were     Pulse Temperature Respirations Height BMI (Body Mass Index)       76 98  F (36.7  C) (Oral) 16 5' 8\" (1.727 m) 23.19 kg/m2        Blood Pressure from Last 3 Encounters:   06/01/18 (!) 138/100   01/29/18 (!) 169/104   06/14/17 130/82    Weight from Last 3 Encounters:   06/01/18 152 lb 8 oz (69.2 kg)   01/29/18 157 lb 3.2 oz (71.3 kg)   06/14/17 152 lb (68.9 kg)              Today, you " had the following     No orders found for display         Today's Medication Changes          These changes are accurate as of 6/1/18  3:57 PM.  If you have any questions, ask your nurse or doctor.               Start taking these medicines.        Dose/Directions    lisinopril 10 MG tablet   Commonly known as:  PRINIVIL/ZESTRIL   Used for:  Benign essential hypertension   Started by:  Melanie Denney PA-C        Dose:  10 mg   Take 1 tablet (10 mg) by mouth daily   Quantity:  30 tablet   Refills:  1         Stop taking these medicines if you haven't already. Please contact your care team if you have questions.     methocarbamol 750 MG tablet   Commonly known as:  ROBAXIN   Stopped by:  Melanie Denney PA-C           methylPREDNISolone 4 MG tablet   Commonly known as:  MEDROL DOSEPAK   Stopped by:  Melanie Denney PA-C                Where to get your medicines      These medications were sent to [facility] [address] [place] [address] [address] AT [facility] [address]  [address], [facility] [address]     Phone:  [phone]     lisinopril 10 MG tablet                Primary Care Provider Office Phone # Fax #    Abdiel Kelly [phone]       [address] [facility] [address]        Equal Access to Services     DULCE KOWALSKI AH: Hadii aad ku hadasho Soomaali, waaxda luqadaha, qaybta kaalmada adeegyada, alba abbasi. So [facility] [phone].    ATENCIÓN: Si habla español, tiene a reyes disposición servicios gratuitos de asistencia lingüística. Llmichelle al [phone].    We comply with applicable federal civil rights laws and [place] laws. We do not discriminate on the basis of race, color, national origin, age, disability, sex, sexual orientation, or gender identity.            Thank you!     Thank you for choosing [facility]  for your care. Our goal is always to provide  you with excellent care. Hearing back from our patients is one way we can continue to improve our services. Please take a few minutes to complete the written survey that you may receive in the mail after your visit with us. Thank you!             Your Updated Medication List - Protect others around you: Learn how to safely use, store and throw away your medicines at www.disposemymeds.org.          This list is accurate as of 6/1/18  3:57 PM.  Always use your most recent med list.                   Brand Name Dispense Instructions for use Diagnosis    ADVIL PO           lisinopril 10 MG tablet    PRINIVIL/ZESTRIL    30 tablet    Take 1 tablet (10 mg) by mouth daily    Benign essential hypertension       tadalafil 20 MG tablet    CIALIS    6 tablet    Take 0.5-1 tablets (10-20 mg) by mouth daily as needed for erectile dysfunction Never use with nitroglycerin, terazosin or doxazosin.    Impotence of organic origin

## 2018-06-01 NOTE — PROGRESS NOTES
"  SUBJECTIVE:   Iain Fuentes is a 47 year old male who presents to clinic today for the following health issues:      Hypertension Follow-up      Outpatient blood pressures are being checked at store.  Results are 160/100.    Low Salt Diet: low salt      Amount of exercise or physical activity: 2-3 days/week for an average of greater than 60 minutes    Problems taking medications regularly: No    Medication side effects: none    Diet: regular (no restrictions)    Patient was on BP medication last year. Lisinopril 10 mg. At the time of his last visit in 6/2017 bp was ok and he had not been taking medication for a few weeks.   He has been monitor at the pharmacy .  Concerned about elevated bp noted above.   Denies any headache chest pain pressure.   Has stayed sober.         -------------------------------------    Problem list and histories reviewed & adjusted, as indicated.  Additional history: as documented    Labs reviewed in EPIC    Reviewed and updated as needed this visit by clinical staff  Tobacco  Allergies  Meds       Reviewed and updated as needed this visit by Provider  Allergies  Meds         ROS:  Constitutional, HEENT, cardiovascular, pulmonary, gi and gu systems are negative, except as otherwise noted.    OBJECTIVE:     BP (!) 138/100 (BP Location: Left arm, Patient Position: Chair, Cuff Size: Adult Regular)  Pulse 76  Temp 98  F (36.7  C) (Oral)  Resp 16  Ht 5' 8\" (1.727 m)  Wt 152 lb 8 oz (69.2 kg)  BMI 23.19 kg/m2  Body mass index is 23.19 kg/(m^2).  GENERAL: healthy, alert and no distress  RESP: lungs clear to auscultation - no rales, rhonchi or wheezes  CV: regular rate and rhythm, normal S1 S2, no S3 or S4, no murmur, click or rub, no peripheral edema and peripheral pulses strong  MS: no gross musculoskeletal defects noted, no edema  SKIN: no suspicious lesions or rashes    Diagnostic Test Results:  none     ASSESSMENT/PLAN:             1. Benign essential " hypertension  Uncontrolled    Restart medications  See PCP in 2-3 weeks for recheck - labs etc.     - lisinopril (PRINIVIL/ZESTRIL) 10 MG tablet; Take 1 tablet (10 mg) by mouth daily  Dispense: 30 tablet; Refill: 1        Melanie Denney PA-C  [facility]

## 2018-06-01 NOTE — PATIENT INSTRUCTIONS
Check blood pressure a few times a week and bring with to appointment with Dr Kelly.     Option to have pharmacist at [facility] pharmacy check your blood pressure too.

## 2018-08-06 ENCOUNTER — OFFICE VISIT (OUTPATIENT)
Dept: INTERNAL MEDICINE | Facility: CLINIC | Age: 48
End: 2018-08-06
Payer: COMMERCIAL

## 2018-08-06 VITALS
BODY MASS INDEX: 22.88 KG/M2 | DIASTOLIC BLOOD PRESSURE: 80 MMHG | WEIGHT: 150.5 LBS | RESPIRATION RATE: 12 BRPM | OXYGEN SATURATION: 97 % | HEART RATE: 62 BPM | SYSTOLIC BLOOD PRESSURE: 120 MMHG | TEMPERATURE: 98.2 F

## 2018-08-06 DIAGNOSIS — Z00.00 ROUTINE GENERAL MEDICAL EXAMINATION AT A HEALTH CARE FACILITY: Primary | ICD-10-CM

## 2018-08-06 DIAGNOSIS — F10.21 ALCOHOL DEPENDENCE IN REMISSION (H): ICD-10-CM

## 2018-08-06 DIAGNOSIS — Z72.0 TOBACCO ABUSE: ICD-10-CM

## 2018-08-06 DIAGNOSIS — N52.9 IMPOTENCE OF ORGANIC ORIGIN: ICD-10-CM

## 2018-08-06 DIAGNOSIS — Z13.6 CARDIOVASCULAR SCREENING; LDL GOAL LESS THAN 130: ICD-10-CM

## 2018-08-06 DIAGNOSIS — I10 BENIGN ESSENTIAL HYPERTENSION: ICD-10-CM

## 2018-08-06 LAB
ANION GAP SERPL CALCULATED.3IONS-SCNC: 2 MMOL/L (ref 3–14)
BUN SERPL-MCNC: 16 MG/DL (ref 7–30)
CALCIUM SERPL-MCNC: 8.9 MG/DL (ref 8.5–10.1)
CHLORIDE SERPL-SCNC: 106 MMOL/L (ref 94–109)
CHOLEST SERPL-MCNC: 211 MG/DL
CO2 SERPL-SCNC: 32 MMOL/L (ref 20–32)
CREAT SERPL-MCNC: 0.87 MG/DL (ref 0.66–1.25)
GFR SERPL CREATININE-BSD FRML MDRD: >90 ML/MIN/1.7M2
GLUCOSE SERPL-MCNC: 94 MG/DL (ref 70–99)
HDLC SERPL-MCNC: 45 MG/DL
LDLC SERPL CALC-MCNC: 149 MG/DL
NONHDLC SERPL-MCNC: 166 MG/DL
POTASSIUM SERPL-SCNC: 4.4 MMOL/L (ref 3.4–5.3)
SODIUM SERPL-SCNC: 140 MMOL/L (ref 133–144)
TRIGL SERPL-MCNC: 85 MG/DL

## 2018-08-06 PROCEDURE — 36415 COLL VENOUS BLD VENIPUNCTURE: CPT | Performed by: INTERNAL MEDICINE

## 2018-08-06 PROCEDURE — 99396 PREV VISIT EST AGE 40-64: CPT | Performed by: INTERNAL MEDICINE

## 2018-08-06 PROCEDURE — 80061 LIPID PANEL: CPT | Performed by: INTERNAL MEDICINE

## 2018-08-06 PROCEDURE — 80048 BASIC METABOLIC PNL TOTAL CA: CPT | Performed by: INTERNAL MEDICINE

## 2018-08-06 RX ORDER — LISINOPRIL 10 MG/1
10 TABLET ORAL DAILY
Qty: 90 TABLET | Refills: 3 | Status: SHIPPED | OUTPATIENT
Start: 2018-08-06 | End: 2019-04-30

## 2018-08-06 RX ORDER — TADALAFIL 20 MG/1
TABLET ORAL
Qty: 6 TABLET | Refills: 11 | Status: SHIPPED | OUTPATIENT
Start: 2018-08-06 | End: 2021-02-25

## 2018-08-06 NOTE — MR AVS SNAPSHOT
After Visit Summary   8/6/2018    Iain Fuentes    MRN: [id number]           Patient Information     Date Of Birth          1970        Visit Information        Provider Department      8/6/2018 8:40 AM Abdiel Kelly MD [facility]        Today's Diagnoses     Routine general medical examination at a health care facility    -  1    Benign essential hypertension        Impotence of organic origin        Tobacco abuse        Alcohol dependence in remission (H)        CARDIOVASCULAR SCREENING; LDL GOAL LESS THAN 130          Care Instructions    The heart-healthy Mediterranean is a healthy eating plan based on typical foods and recipes of Mediterranean-style cooking. Here's how to adopt the Mediterranean diet.   If you're looking for a heart-healthy eating plan, the Mediterranean diet might be right for you. The Mediterranean diet incorporates the basics of healthy eating -- plus a splash of flavorful olive oil and perhaps even a glass of red wine -- among other components characterizing the traditional cooking style of countries bordering the [place].  Most healthy diets include fruits, vegetables, fish and whole grains, and limit unhealthy fats. While these parts of a healthy diet remain tried-and-true, subtle variations or differences in proportions of certain foods may make a difference in your risk of heart disease.  Research has shown that the traditional Mediterranean diet reduces the risk of heart disease. In fact, an analysis of more than 1.5 million healthy adults demonstrated that following a Mediterranean diet was associated with a reduced risk of death from heart disease and cancer, as well as a reduced incidence of Parkinson's and Alzheimer's diseases.   The Dietary Guidelines for Americans recommends the Mediterranean diet as an eating plan that can help promote health and prevent disease. And the Mediterranean diet is one your whole  "family can follow for good health.   The Mediterranean diet emphasizes:  Eating primarily plant-based foods, such as fruits and vegetables, whole grains, legumes and nuts   Replacing butter with healthy fats, such as olive oil   Using herbs and spices instead of salt to flavor foods   Limiting red meat to no more than a few times a month   Eating fish and poultry at least twice a week   Drinking red wine in moderation (optional)  The diet also recognizes the importance of being physically active, and enjoying meals with family and friends.  The Mediterranean diet traditionally includes fruits, vegetables and grains. For example, residents of [facility] average six or more servings a day of antioxidant-rich fruits and vegetables.  Grains in the Mediterranean region are typically whole grain and usually contain very few unhealthy trans fats, and bread is an important part of the diet. However, throughout the Mediterranean region, bread is eaten plain or dipped in olive oil -- not eaten with butter or margarine, which contains saturated or trans fats.   Nuts are another part of a healthy Mediterranean diet. Nuts are high in fat, but most of the fat is healthy. Because nuts are high in calories, they should not be eaten in large amounts -- generally no more than a handful a day. For the best nutrition, avoid candied or honey-roasted and heavily salted nuts.  The focus of the Mediterranean diet isn't on limiting total fat consumption, but rather on choosing healthier types of fat. The Mediterranean diet discourages saturated fats and hydrogenated oils (trans fats), both of which contribute to heart disease.  The Mediterranean diet features olive oil as the primary source of fat. Olive oil is mainly monounsaturated fat -- a type of fat that can help reduce low-density lipoprotein (LDL) cholesterol levels when used in place of saturated or trans fats. \"Extra-virgin\" and \"virgin\" olive oils (the least processed forms) also " contain the highest levels of protective plant compounds that provide antioxidant effects.  Canola oil and some nuts contain the beneficial linolenic acid (a type of omega-3 fatty acid) in addition to healthy unsaturated fat. Omega-3 fatty acids lower triglycerides, decrease blood clotting, and are associated with decreased incidence of sudden heart attacks, improve the health of your blood vessels, and help moderate blood pressure. Fatty fish -- such as mackerel, lake trout, herring, sardines, albacore tuna and salmon -- are rich sources of omega-3 fatty acids. Fish is eaten on a regular basis in the Mediterranean diet.  The health effects of alcohol have been debated for many years, and some doctors are reluctant to encourage alcohol consumption because of the health consequences of excessive drinking. However, alcohol -- in moderation -- has been associated with a reduced risk of heart disease in some research studies.  The Mediterranean diet typically includes a moderate amount of wine, usually red wine. This means no more than 5 ounces (148 milliliters) of wine daily for women of all ages and men older than age 65 and no more than 10 ounces (296 milliliters) of wine daily for younger men. More than this may increase the risk of health problems, including increased risk of certain types of cancer.   If you're unable to limit your alcohol intake to the amounts defined above, if you have a personal or family history of alcohol abuse, or if you have heart or liver disease, refrain from drinking wine or any other alcohol.  The Mediterranean diet is a delicious and healthy way to eat. Many people who switch to this style of eating say they'll never eat any other way. Here are some specific steps to get you started:   Eat your veggies and fruits -- and switch to whole grains. Avariety of plant foods should make up the majority of your meals. They should be minimally processed -- fresh and whole are best. Include  veggies and fruits in every meal and eat them for snacks as well. Switch to whole-grain bread and cereal, and begin to eat more whole-grain rice and pasta products. Keep baby carrots, apples and bananas on hand for quick, satisfying snacks. Fruit salads are a wonderful way to eat a variety of healthy fruit.   Go nuts. Nuts and seeds are good sources of fiber, protein and healthy fats. Keep almonds, cashews, pistachios and walnuts on hand for a quick snack. Choose natural peanut butter, rather than the kind with hydrogenated fat added. Try blended sesame seeds (tahini) as a dip or spread for bread.   Pass on the butter. Try olive or canola oil as a healthy replacement for butter or margarine. Lightly drizzle it over vegetables. After cooking pasta, add a touch of olive oil, some garlic and green onions for flavoring. Dip bread in flavored olive oil or lightly spread it on whole-grain bread for a tasty alternative to butter. Try tahini as a dip or spread for bread too.   Spice it up. Herbs and spices make food tasty and can  for salt and fat in recipes.   Go fish. Eat fish at least twice a week. Fresh or water-packed tuna, salmon, trout, mackerel and herring are healthy choices. [place], bake or broil fish for great taste and easy cleanup. Avoid breaded and fried fish.   Rein in the red meat. Limit red meat to no more than a few times a month. Substitute fish and poultry for red meat. When choosing red meat, make sure it's lean and keep portions small (about the size of a deck of cards). Also avoid sausage, neil and other high-fat, processed meats.   Choose low-fat dairy. Limit higher fat dairy products, such as whole or 2 percent milk, cheese and ice cream. Switch to skim milk, fat-free yogurt and low-fat cheese      Preventive Health Recommendations  Male Ages 40 to 49    Yearly exam:             See your health care provider every year in order to  o   Review health changes.   o   Discuss preventive care.     o   Review your medicines if your doctor has prescribed any.    You should be tested each year for STDs (sexually transmitted diseases) if you re at risk.     Have a cholesterol test every 5 years.     Have a colonoscopy (test for colon cancer) if someone in your family has had colon cancer or polyps before age 50.     After age 45, have a diabetes test (fasting glucose). If you are at risk for diabetes, you should have this test every 3 years.      Talk with your health care provider about whether or not a prostate cancer screening test (PSA) is right for you.    Shots: Get a flu shot each year. Get a tetanus shot every 10 years.     Nutrition:    Eat at least 5 servings of fruits and vegetables daily.     Eat whole-grain bread, whole-wheat pasta and brown rice instead of white grains and rice.     Get adequate Calcium and Vitamin D.     Lifestyle    Exercise for at least 150 minutes a week (30 minutes a day, 5 days a week). This will help you control your weight and prevent disease.     Limit alcohol to one drink per day.     No smoking.     Wear sunscreen to prevent skin cancer.     See your dentist every six months for an exam and cleaning.              Follow-ups after your visit        Who to contact     If you have questions or need follow up information about today's clinic visit or your schedule please contact [facility] directly at [phone].  Normal or non-critical lab and imaging results will be communicated to you by MyChart, letter or phone within 4 business days after the clinic has received the results. If you do not hear from us within 7 days, please contact the clinic through MyChart or phone. If you have a critical or abnormal lab result, we will notify you by phone as soon as possible.  Submit refill requests through [employer] or call your pharmacy and they will forward the refill request to us. Please allow 3 business days for your refill to be completed.           Additional Information About Your Visit        Care EveryWhere ID     This is your Care EveryWhere ID. This could be used by other organizations to access your [facility] medical records  [id number]        Your Vitals Were     Pulse Temperature Respirations Pulse Oximetry BMI (Body Mass Index)       62 98.2  F (36.8  C) (Oral) 12 97% 22.88 kg/m2        Blood Pressure from Last 3 Encounters:   08/06/18 120/80   06/01/18 (!) 138/100   01/29/18 (!) 169/104    Weight from Last 3 Encounters:   08/06/18 150 lb 8 oz (68.3 kg)   06/01/18 152 lb 8 oz (69.2 kg)   01/29/18 157 lb 3.2 oz (71.3 kg)              We Performed the Following     Basic metabolic panel     Lipid panel reflex to direct LDL Fasting          Today's Medication Changes          These changes are accurate as of 8/6/18  9:30 AM.  If you have any questions, ask your nurse or doctor.               These medicines have changed or have updated prescriptions.        Dose/Directions    tadalafil 20 MG tablet   Commonly known as:  CIALIS   This may have changed:    - how much to take  - how to take this  - when to take this  - reasons to take this  - additional instructions   Used for:  Impotence of organic origin   Changed by:  Abdiel Kelly MD        Take 0.5-1 tablets (10-20 mg) by mouth every 48 hours as needed for erectile dysfunction   Quantity:  6 tablet   Refills:  11            Where to get your medicines      These medications were sent to [facility] [address] [place] [address] [address] AT [facility] [address] [place]  [address], [facility] [address]     Phone:  [phone]     lisinopril 10 MG tablet    tadalafil 20 MG tablet                Primary Care Provider Office Phone # Fax #    Abdiel Kelly [phone]       [address] [facility] [address]        Equal Access to Services     DULCE KOWALSKI AH: Chris Cueto, chetna andrade, phill schwab,  alba wardnana vázquez'aan ah. So [facility] [phone].    ATENCIÓN: Si habla lizandro, tiene a reyse disposición servicios gratuitos de asistencia lingüística. Pavan al [phone].    We comply with applicable federal civil rights laws and [place] laws. We do not discriminate on the basis of race, color, national origin, age, disability, sex, sexual orientation, or gender identity.            Thank you!     Thank you for choosing [facility]  for your care. Our goal is always to provide you with excellent care. Hearing back from our patients is one way we can continue to improve our services. Please take a few minutes to complete the written survey that you may receive in the mail after your visit with us. Thank you!             Your Updated Medication List - Protect others around you: Learn how to safely use, store and throw away your medicines at www.disposemymeds.org.          This list is accurate as of 8/6/18  9:30 AM.  Always use your most recent med list.                   Brand Name Dispense Instructions for use Diagnosis    ADVIL PO           lisinopril 10 MG tablet    PRINIVIL/ZESTRIL    90 tablet    Take 1 tablet (10 mg) by mouth daily    Benign essential hypertension       tadalafil 20 MG tablet    CIALIS    6 tablet    Take 0.5-1 tablets (10-20 mg) by mouth every 48 hours as needed for erectile dysfunction    Impotence of organic origin

## 2018-08-06 NOTE — PATIENT INSTRUCTIONS
The heart-healthy Mediterranean is a healthy eating plan based on typical foods and recipes of Mediterranean-style cooking. Here's how to adopt the Mediterranean diet.   If you're looking for a heart-healthy eating plan, the Mediterranean diet might be right for you. The Mediterranean diet incorporates the basics of healthy eating   plus a splash of flavorful olive oil and perhaps even a glass of red wine   among other components characterizing the traditional cooking style of countries bordering the [place].  Most healthy diets include fruits, vegetables, fish and whole grains, and limit unhealthy fats. While these parts of a healthy diet remain tried-and-true, subtle variations or differences in proportions of certain foods may make a difference in your risk of heart disease.  Research has shown that the traditional Mediterranean diet reduces the risk of heart disease. In fact, an analysis of more than 1.5 million healthy adults demonstrated that following a Mediterranean diet was associated with a reduced risk of death from heart disease and cancer, as well as a reduced incidence of Parkinson's and Alzheimer's diseases.   The Dietary Guidelines for Americans recommends the Mediterranean diet as an eating plan that can help promote health and prevent disease. And the Mediterranean diet is one your whole family can follow for good health.   The Mediterranean diet emphasizes:  Eating primarily plant-based foods, such as fruits and vegetables, whole grains, legumes and nuts   Replacing butter with healthy fats, such as olive oil   Using herbs and spices instead of salt to flavor foods   Limiting red meat to no more than a few times a month   Eating fish and poultry at least twice a week   Drinking red wine in moderation (optional)  The diet also recognizes the importance of being physically active, and enjoying meals with family and friends.  The Mediterranean diet traditionally includes fruits, vegetables  "and grains. For example, residents of [facility] average six or more servings a day of antioxidant-rich fruits and vegetables.  Grains in the Mediterranean region are typically whole grain and usually contain very few unhealthy trans fats, and bread is an important part of the diet. However, throughout the Mediterranean region, bread is eaten plain or dipped in olive oil   not eaten with butter or margarine, which contains saturated or trans fats.   Nuts are another part of a healthy Mediterranean diet. Nuts are high in fat, but most of the fat is healthy. Because nuts are high in calories, they should not be eaten in large amounts   generally no more than a handful a day. For the best nutrition, avoid candied or honey-roasted and heavily salted nuts.  The focus of the Mediterranean diet isn't on limiting total fat consumption, but rather on choosing healthier types of fat. The Mediterranean diet discourages saturated fats and hydrogenated oils (trans fats), both of which contribute to heart disease.  The Mediterranean diet features olive oil as the primary source of fat. Olive oil is mainly monounsaturated fat   a type of fat that can help reduce low-density lipoprotein (LDL) cholesterol levels when used in place of saturated or trans fats. \"Extra-virgin\" and \"virgin\" olive oils (the least processed forms) also contain the highest levels of protective plant compounds that provide antioxidant effects.  Canola oil and some nuts contain the beneficial linolenic acid (a type of omega-3 fatty acid) in addition to healthy unsaturated fat. Omega-3 fatty acids lower triglycerides, decrease blood clotting, and are associated with decreased incidence of sudden heart attacks, improve the health of your blood vessels, and help moderate blood pressure. Fatty fish   such as mackerel, lake trout, herring, sardines, albacore tuna and salmon   are rich sources of omega-3 fatty acids. Fish is eaten on a regular basis in the " Mediterranean diet.  The health effects of alcohol have been debated for many years, and some doctors are reluctant to encourage alcohol consumption because of the health consequences of excessive drinking. However, alcohol   in moderation   has been associated with a reduced risk of heart disease in some research studies.  The Mediterranean diet typically includes a moderate amount of wine, usually red wine. This means no more than 5 ounces (148 milliliters) of wine daily for women of all ages and men older than age 65 and no more than 10 ounces (296 milliliters) of wine daily for younger men. More than this may increase the risk of health problems, including increased risk of certain types of cancer.   If you're unable to limit your alcohol intake to the amounts defined above, if you have a personal or family history of alcohol abuse, or if you have heart or liver disease, refrain from drinking wine or any other alcohol.  The Mediterranean diet is a delicious and healthy way to eat. Many people who switch to this style of eating say they'll never eat any other way. Here are some specific steps to get you started:   Eat your veggies and fruits   and switch to whole grains. Avariety of plant foods should make up the majority of your meals. They should be minimally processed   fresh and whole are best. Include veggies and fruits in every meal and eat them for snacks as well. Switch to whole-grain bread and cereal, and begin to eat more whole-grain rice and pasta products. Keep baby carrots, apples and bananas on hand for quick, satisfying snacks. Fruit salads are a wonderful way to eat a variety of healthy fruit.   Go nuts. Nuts and seeds are good sources of fiber, protein and healthy fats. Keep almonds, cashews, pistachios and walnuts on hand for a quick snack. Choose natural peanut butter, rather than the kind with hydrogenated fat added. Try blended sesame seeds (tahini) as a dip or spread for bread.   Pass on the  butter. Try olive or canola oil as a healthy replacement for butter or margarine. Lightly drizzle it over vegetables. After cooking pasta, add a touch of olive oil, some garlic and green onions for flavoring. Dip bread in flavored olive oil or lightly spread it on whole-grain bread for a tasty alternative to butter. Try tahini as a dip or spread for bread too.   Spice it up. Herbs and spices make food tasty and can  for salt and fat in recipes.   Go fish. Eat fish at least twice a week. Fresh or water-packed tuna, salmon, trout, mackerel and herring are healthy choices. [place], bake or broil fish for great taste and easy cleanup. Avoid breaded and fried fish.   Rein in the red meat. Limit red meat to no more than a few times a month. Substitute fish and poultry for red meat. When choosing red meat, make sure it's lean and keep portions small (about the size of a deck of cards). Also avoid sausage, neil and other high-fat, processed meats.   Choose low-fat dairy. Limit higher fat dairy products, such as whole or 2 percent milk, cheese and ice cream. Switch to skim milk, fat-free yogurt and low-fat cheese      Preventive Health Recommendations  Male Ages 40 to 49    Yearly exam:             See your health care provider every year in order to  o   Review health changes.   o   Discuss preventive care.    o   Review your medicines if your doctor has prescribed any.    You should be tested each year for STDs (sexually transmitted diseases) if you re at risk.     Have a cholesterol test every 5 years.     Have a colonoscopy (test for colon cancer) if someone in your family has had colon cancer or polyps before age 50.     After age 45, have a diabetes test (fasting glucose). If you are at risk for diabetes, you should have this test every 3 years.      Talk with your health care provider about whether or not a prostate cancer screening test (PSA) is right for you.    Shots: Get a flu shot each year. Get a tetanus  shot every 10 years.     Nutrition:    Eat at least 5 servings of fruits and vegetables daily.     Eat whole-grain bread, whole-wheat pasta and brown rice instead of white grains and rice.     Get adequate Calcium and Vitamin D.     Lifestyle    Exercise for at least 150 minutes a week (30 minutes a day, 5 days a week). This will help you control your weight and prevent disease.     Limit alcohol to one drink per day.     No smoking.     Wear sunscreen to prevent skin cancer.     See your dentist every six months for an exam and cleaning.

## 2018-08-06 NOTE — LETTER
August 8, 2018      Iain Fuentes  [address] [address]        Dear ,    We are writing to inform you of your test results.    Using the new American Heart Association risk calculator your 10 yr risk of global cardiovascular disease (heart attack, stroke) is 8%.  As we discussed quitting smoking would significantly lower this risk and I think is in my opinion the next step to preventing future heart problems.  At any risk level > 7.5-10% we recommend use of a statin cholesterol lowering medication.      Resulted Orders   Basic metabolic panel   Result Value Ref Range    Sodium 140 133 - 144 mmol/L    Potassium 4.4 3.4 - 5.3 mmol/L    Chloride 106 94 - 109 mmol/L    Carbon Dioxide 32 20 - 32 mmol/L    Anion Gap 2 (L) 3 - 14 mmol/L    Glucose 94 70 - 99 mg/dL    Urea Nitrogen 16 7 - 30 mg/dL    Creatinine 0.87 0.66 - 1.25 mg/dL    GFR Estimate >90 >60 mL/min/1.7m2      Comment:      Non  GFR Calc    GFR Estimate If Black >90 >60 mL/min/1.7m2      Comment:       GFR Calc    Calcium 8.9 8.5 - 10.1 mg/dL   Lipid panel reflex to direct LDL Fasting   Result Value Ref Range    Cholesterol 211 (H) <200 mg/dL      Comment:      Desirable:       <200 mg/dl    Triglycerides 85 <150 mg/dL    HDL Cholesterol 45 >39 mg/dL    LDL Cholesterol Calculated 149 (H) <100 mg/dL      Comment:      Above desirable:  100-129 mg/dl  Borderline High:  130-159 mg/dL  High:             160-189 mg/dL  Very high:       >189 mg/dl      Non HDL Cholesterol 166 (H) <130 mg/dL      Comment:      Above Desirable:  130-159 mg/dl  Borderline high:  160-189 mg/dl  High:             190-219 mg/dl  Very high:       >219 mg/dl         If you have any questions or concerns, please call the clinic at the number listed above.       Sincerely,        Abdiel Kelly MD

## 2018-08-06 NOTE — PROGRESS NOTES
SUBJECTIVE:   CC: Iain Fuentes is an 47 year old male who presents for preventative health visit.     Physical   Annual:     Getting at least 3 servings of Calcium per day:  Yes    Bi-annual eye exam:  Yes    Dental care twice a year:  Yes    Sleep apnea or symptoms of sleep apnea:  None    Diet:  Regular (no restrictions) and Breakfast skipped    Frequency of exercise:  2-3 days/week    Duration of exercise:  30-45 minutes    Taking medications regularly:  Yes    Medication side effects:  None    Additional concerns today:  No        Hypertension Follow-up      Outpatient blood pressures are not being checked.    Low Salt Diet: no added salt      Today's PHQ-2 Score:   PHQ-2 ( 1999 [employer]) 8/6/2018   Q1: Little interest or pleasure in doing things 0   Q2: Feeling down, depressed or hopeless 0   PHQ-2 Score 0   Q1: Little interest or pleasure in doing things Not at all   Q2: Feeling down, depressed or hopeless Not at all   PHQ-2 Score 0       Abuse: Current or Past(Physical, Sexual or Emotional)- No  Do you feel safe in your environment - Yes    Social History   Substance Use Topics     Smoking status: Current Every Day Smoker     Packs/day: 1.00     Years: 20.00     Types: Cigarettes     Smokeless tobacco: Never Used     Alcohol use No     Alcohol Use 8/6/2018   If you drink alcohol do you typically have greater than 3 drinks per day OR greater than 7 drinks per week? No   No flowsheet data found.    Last PSA: No results found for: PSA    Reviewed orders with patient. Reviewed health maintenance and updated orders accordingly - Yes        Reviewed and updated as needed this visit by clinical staff  Tobacco  Allergies  Meds  Soc Hx        Reviewed and updated as needed this visit by Provider  Tobacco  Soc Hx           Review of Systems  CONSTITUTIONAL: NEGATIVE for fever, chills, change in weight  INTEGUMENTARY/SKIN: NEGATIVE for worrisome rashes, moles or lesions  EYES: NEGATIVE for vision changes or  irritation  ENT: NEGATIVE for ear, mouth and throat problems  RESP: NEGATIVE for significant cough or SOB  CV: NEGATIVE for chest pain, palpitations or peripheral edema  GI: NEGATIVE for nausea, abdominal pain, heartburn, or change in bowel habits   male: negative for dysuria, hematuria, decreased urinary stream, erectile dysfunction, urethral discharge  MUSCULOSKELETAL: NEGATIVE for significant arthralgias or myalgia  NEURO: NEGATIVE for weakness, dizziness or paresthesias  PSYCHIATRIC: NEGATIVE for changes in mood or affect    OBJECTIVE:   /80  Pulse 62  Temp 98.2  F (36.8  C) (Oral)  Resp 12  Wt 150 lb 8 oz (68.3 kg)  SpO2 97%  BMI 22.88 kg/m2    Physical Exam  GENERAL: healthy, alert and no distress  EYES: Eyes grossly normal to inspection, PERRL and conjunctivae and sclerae normal  HENT: ear canals and TM's normal, nose and mouth without ulcers or lesions  NECK: no adenopathy, no asymmetry, masses, or scars and thyroid normal to palpation  RESP: lungs clear to auscultation - no rales, rhonchi or wheezes  CV: regular rate and rhythm, normal S1 S2, no S3 or S4, no murmur, click or rub, no peripheral edema and peripheral pulses strong  ABDOMEN: soft, nontender, no hepatosplenomegaly, no masses and bowel sounds normal  MS: no gross musculoskeletal defects noted, no edema  SKIN: no suspicious lesions or rashes  NEURO: Normal strength and tone, mentation intact and speech normal  PSYCH: mentation appears normal, affect normal/bright  LYMPH: no cervical, supraclavicular, axillary, or inguinal adenopathy    Diagnostic Test Results:  Results for orders placed or performed in visit on 08/06/18 (from the past 24 hour(s))   Basic metabolic panel   Result Value Ref Range    Sodium 140 133 - 144 mmol/L    Potassium 4.4 3.4 - 5.3 mmol/L    Chloride 106 94 - 109 mmol/L    Carbon Dioxide 32 20 - 32 mmol/L    Anion Gap 2 (L) 3 - 14 mmol/L    Glucose 94 70 - 99 mg/dL    Urea Nitrogen 16 7 - 30 mg/dL    Creatinine  "0.87 0.66 - 1.25 mg/dL    GFR Estimate >90 >60 mL/min/1.7m2    GFR Estimate If Black >90 >60 mL/min/1.7m2    Calcium 8.9 8.5 - 10.1 mg/dL   Lipid panel reflex to direct LDL Fasting   Result Value Ref Range    Cholesterol 211 (H) <200 mg/dL    Triglycerides 85 <150 mg/dL    HDL Cholesterol 45 >39 mg/dL    LDL Cholesterol Calculated 149 (H) <100 mg/dL    Non HDL Cholesterol 166 (H) <130 mg/dL       ASSESSMENT/PLAN:   1. Routine general medical examination at a health care facility  Discussed importance of stopping smoking giving his elevating CV risk      2. Benign essential hypertension  Blood pressure is well controlled with restarting of the lisinopril.  Continue present dose  - lisinopril (PRINIVIL/ZESTRIL) 10 MG tablet; Take 1 tablet (10 mg) by mouth daily  Dispense: 90 tablet; Refill: 3  - Basic metabolic panel    3. Impotence of organic origin  - tadalafil (CIALIS) 20 MG tablet; Take 0.5-1 tablets (10-20 mg) by mouth every 48 hours as needed for erectile dysfunction  Dispense: 6 tablet; Refill: 11    4. Tobacco abuse  Discussed cessation but is not interested at this time    5. Alcohol dependence in remission (H)  Remain sober    6. CARDIOVASCULAR SCREENING; LDL GOAL LESS THAN 130  The 10-year ASCVD risk score (Cuauhtemoc KIERRA Jr, et al., 2013) is: 8%    Values used to calculate the score:      Age: 47 years      Sex: Male      Is Non- : No      Diabetic: No      Tobacco smoker: Yes      Systolic Blood Pressure: 120 mmHg      Is BP treated: Yes      HDL Cholesterol: 45 mg/dL      Total Cholesterol: 211 mg/dL   - Basic metabolic panel  - Lipid panel reflex to direct LDL Fasting    COUNSELING:   Reviewed preventive health counseling, as reflected in patient instructions    BP Readings from Last 1 Encounters:   08/06/18 120/80     Estimated body mass index is 22.88 kg/(m^2) as calculated from the following:    Height as of 6/1/18: 5' 8\" (1.727 m).    Weight as of this encounter: 150 lb 8 oz (68.3 " kg).           reports that he has been smoking Cigarettes.  He has a 20.00 pack-year smoking history. He has never used smokeless tobacco.  Tobacco Cessation Action Plan: Information offered: Patient not interested at this time    Counseling Resources:  ATP IV Guidelines  Pooled Cohorts Equation Calculator  FRAX Risk Assessment  ICSI Preventive Guidelines  Dietary Guidelines for Americans, 2010  USDA's MyPlate  ASA Prophylaxis  Lung CA Screening    Abdiel Kelly MD  [facility]  Answers for HPI/ROS submitted by the patient on 8/6/2018   PHQ-2 Score: 0

## 2018-08-09 DIAGNOSIS — I10 BENIGN ESSENTIAL HYPERTENSION: ICD-10-CM

## 2018-08-09 RX ORDER — LISINOPRIL 10 MG/1
TABLET ORAL
Qty: 30 TABLET | Refills: 11 | Status: SHIPPED | OUTPATIENT
Start: 2018-08-09 | End: 2019-04-30

## 2018-08-09 NOTE — TELEPHONE ENCOUNTER
"Requested Prescriptions   Pending Prescriptions Disp Refills     lisinopril (PRINIVIL/ZESTRIL) 10 MG tablet [Pharmacy Med Name: LISINOPRIL 10MG TABLETS] 30 tablet 0    Last Written Prescription Date:  8/6/18  Last Fill Quantity: 90,  # refills: 3   Last office visit: 8/6/2018 with prescribing provider:  8/6/18   Future Office Visit:     Sig: TAKE 1 TABLET(10 MG) BY MOUTH DAILY    ACE Inhibitors (Including Combos) Protocol Passed    8/9/2018  6:09 AM       Passed - Blood pressure under 140/90 in past 12 months    BP Readings from Last 3 Encounters:   08/06/18 120/80   06/01/18 (!) 138/100   01/29/18 (!) 169/104                Passed - Recent (12 mo) or future (30 days) visit within the authorizing provider's specialty    Patient had office visit in the last 12 months or has a visit in the next 30 days with authorizing provider or within the authorizing provider's specialty.  See \"Patient Info\" tab in inbasket, or \"Choose Columns\" in Meds & Orders section of the refill encounter.           Passed - Patient is age 18 or older       Passed - Normal serum creatinine on file in past 12 months    Recent Labs   Lab Test  08/06/18   0936   CR  0.87            Passed - Normal serum potassium on file in past 12 months    Recent Labs   Lab Test  08/06/18   0936   POTASSIUM  4.4               "

## 2019-03-03 ENCOUNTER — ALLIED HEALTH/NURSE VISIT (OUTPATIENT)
Dept: INTERNAL MEDICINE | Facility: CLINIC | Age: 49
End: 2019-03-03

## 2019-03-03 VITALS — DIASTOLIC BLOOD PRESSURE: 92 MMHG | SYSTOLIC BLOOD PRESSURE: 150 MMHG

## 2019-03-03 DIAGNOSIS — Z01.30 BP CHECK: Primary | ICD-10-CM

## 2019-03-03 PROCEDURE — 99207 ZZC NO CHARGE NURSE ONLY: CPT | Performed by: INTERNAL MEDICINE

## 2019-03-03 NOTE — PROGRESS NOTES
Iain Fuentes is enrolled/participating in the retail pharmacy Blood Pressure Goals Achievement Program (BPGAP).  Iain Fuentes was evaluated at [facility] on March 3, 2019 at which time his blood pressure was:    BP Readings from Last 3 Encounters:   03/03/19 (!) 150/92   08/06/18 120/80   06/01/18 (!) 138/100     Reviewed lifestyle modifications for blood pressure control and reduction: including making healthy food choices, managing weight, getting regular exercise, smoking cessation, reducing alcohol consumption, monitoring blood pressure regularly.     Iain Fuentes is not experiencing symptoms.    Follow-Up: BP is not at goal of < 140/90mmHg (patient 18+ years of age with or without diabetes), Recommended follow-up with PCP.  Routing to PCP for further review.    Recommendation to Provider: Patient stated he has not been taking his lisinopril for about 1 week. Patient was advised to start taking his medication again and come back to the pharmacy for bp recheck. If bp still high, will refer patient to see PCP.    Iain Fuentes was evaluated for enrollment into the PGEN study today.    PLEASE INITIATE ENROLLMENT DISCUSSION WITH HTN PTS  1) Between 30-80 years old                                                                                                               2) BMI between 19-50                                                                                                        3) BP ?140/90 AND ?170/110 patients aged 30-59         BP ?150/90 AND ?170/110 non-diabetic patients aged 60-80       BP ?140/90 AND ?170/110 diabetic patients aged 60-80  4) Additional requirements for uncontrolled HTN patients:        Pt on only 1 class of medication  5) EXCLUDE patient if confirmation of:                  ? Cardiac disease                  ? Chronic Kidney Disease                  ? Pregnancy/Breastfeeding                  ? Secondary Hypertension/Pre-eclampsia                                  ? Vascular disease    Patient eligible for enrollment:  Yes  Patient interested in enrollment:  No    This note completed by: Brittany Leyva, 3rd Year Student Pharmacist  [facility]  [phone]

## 2019-03-18 ENCOUNTER — ALLIED HEALTH/NURSE VISIT (OUTPATIENT)
Dept: INTERNAL MEDICINE | Facility: CLINIC | Age: 49
End: 2019-03-18

## 2019-03-18 VITALS — DIASTOLIC BLOOD PRESSURE: 84 MMHG | SYSTOLIC BLOOD PRESSURE: 126 MMHG

## 2019-03-18 DIAGNOSIS — I10 ESSENTIAL HYPERTENSION, BENIGN: Primary | ICD-10-CM

## 2019-03-18 PROCEDURE — 99207 ZZC NO CHARGE NURSE ONLY: CPT | Performed by: INTERNAL MEDICINE

## 2019-03-19 NOTE — PROGRESS NOTES
Iain Fuentes is enrolled/participating in the retail pharmacy Blood Pressure Goals Achievement Program (BPGAP).  Iain Fuentes was evaluated at [facility] on March 18, 2019 at which time his blood pressure was:    BP Readings from Last 3 Encounters:   03/18/19 126/84   03/03/19 (!) 150/92   08/06/18 120/80     Reviewed lifestyle modifications for blood pressure control and reduction: including making healthy food choices, managing weight, getting regular exercise, smoking cessation, reducing alcohol consumption, monitoring blood pressure regularly.     Iain Fuentes is not experiencing symptoms.    Follow-Up: BP is at goal of < 140/90mmHg (patient 18+ years of age with or without diabetes).  Recommended follow-up at pharmacy in 6 months.     Recommendation to Provider: continue to monitor    This note completed by: nadja Perez pharmacist

## 2019-04-28 ENCOUNTER — ALLIED HEALTH/NURSE VISIT (OUTPATIENT)
Dept: INTERNAL MEDICINE | Facility: CLINIC | Age: 49
End: 2019-04-28
Payer: COMMERCIAL

## 2019-04-28 VITALS — DIASTOLIC BLOOD PRESSURE: 98 MMHG | SYSTOLIC BLOOD PRESSURE: 159 MMHG

## 2019-04-28 DIAGNOSIS — Z01.30 BP CHECK: Primary | ICD-10-CM

## 2019-04-28 NOTE — PROGRESS NOTES
Iain Fuentes was evaluated at [facility] on April 28, 2019 at which time his blood pressure was:    BP Readings from Last 3 Encounters:   04/28/19 (!) 159/98   03/18/19 126/84   03/03/19 (!) 150/92     Pulse Readings from Last 3 Encounters:   08/06/18 62   06/01/18 76   01/29/18 64       Reviewed lifestyle modifications for blood pressure control and reduction: including making healthy food choices, managing weight, getting regular exercise, smoking cessation, reducing alcohol consumption, monitoring blood pressure regularly.     Symptoms: None    BP Goal:Other: Not at goal    BP Assessment:  BP too high    Potential Reasons for BP too high: Tobacco use within past 30 minutes, Caffeine use within past 30 minutes and Dose of BP medication too low    BP Follow-Up Plan: Referral to PCP    Recommendation to Provider: Please consider increasing lisinopril dose or adding another antihypertensive agent    Note completed by:Brittany Leyva, 3rd Year Student Pharmacist  [facility]  [phone]

## 2019-04-29 ENCOUNTER — ALLIED HEALTH/NURSE VISIT (OUTPATIENT)
Dept: INTERNAL MEDICINE | Facility: CLINIC | Age: 49
End: 2019-04-29
Payer: COMMERCIAL

## 2019-04-29 DIAGNOSIS — I10 ESSENTIAL HYPERTENSION, BENIGN: Primary | ICD-10-CM

## 2019-04-29 PROCEDURE — 99207 ZZC NO CHARGE NURSE ONLY: CPT | Performed by: INTERNAL MEDICINE

## 2019-04-29 NOTE — NURSING NOTE
I have reviewed and approve the message below.    Pola Rubi, PharmD  [facility]  [phone]    Iain Fuentes was evaluated at [facility] on April 28, 2019 at which time his blood pressure was:         BP Readings from Last 3 Encounters:   04/28/19 (!) 159/98   03/18/19 126/84   03/03/19 (!) 150/92          Pulse Readings from Last 3 Encounters:   08/06/18 62   06/01/18 76   01/29/18 64         Reviewed lifestyle modifications for blood pressure control and reduction: including making healthy food choices, managing weight, getting regular exercise, smoking cessation, reducing alcohol consumption, monitoring blood pressure regularly.      Symptoms: None     BP Goal:Other: Not at goal     BP Assessment:  BP too high     Potential Reasons for BP too high: Tobacco use within past 30 minutes, Caffeine use within past 30 minutes and Dose of BP medication too low     BP Follow-Up Plan: Referral to PCP     Recommendation to Provider: Please consider increasing lisinopril dose or adding another antihypertensive agent     Note completed by:Brittany Leyva, 3rd Year Student Pharmacist  [facility]  [phone]

## 2019-04-29 NOTE — PROGRESS NOTES
SUBJECTIVE:   Iain Fuentes is a 48 year old male who presents to clinic today for the following   health issues:    Hypertension Follow-up  -Since last seen last summer the patient unfortunately started drinking again.  He was sober at that time and admits to 20+ drinks per week.  -Also taking quite a few herbal medications.  Some of these include licorice root.      Outpatient blood pressures are being checked at pharmacy.  Results are 150s/90s.    Low Salt Diet: no added salt    Additional history: as documented    Reviewed  and updated as needed this visit by clinical staff  Tobacco  Allergies  Meds  Med Hx  Surg Hx  Fam Hx  Soc Hx      States he smokes cigarettes and marijuana daily    Reviewed and updated as needed this visit by Provider         Labs reviewed in EPIC    ROS:  Constitutional, HEENT, cardiovascular, pulmonary, gi and gu systems are negative, except as otherwise noted.    OBJECTIVE:                                                    BP (!) 160/92   Pulse 62   Temp 97.7  F (36.5  C) (Oral)   Resp 14   Wt 67.1 kg (148 lb)   SpO2 100%   BMI 22.50 kg/m    Body mass index is 22.5 kg/m .  GENERAL APPEARANCE: alert, no distress and anxious  HENT: nose and mouth without ulcers or lesions and normal cephalic/atraumatic  NECK: no adenopathy, no asymmetry, masses, or scars and thyroid normal to palpation  RESP: lungs clear to auscultation - no rales, rhonchi or wheezes  CV: regular rates and rhythm, normal S1 S2, no S3 or S4 and no murmur, click or rub    Diagnostic test results:  Results for orders placed or performed in visit on 04/30/19 (from the past 24 hour(s))   Basic metabolic panel   Result Value Ref Range    Sodium 136 133 - 144 mmol/L    Potassium 4.8 3.4 - 5.3 mmol/L    Chloride 104 94 - 109 mmol/L    Carbon Dioxide 28 20 - 32 mmol/L    Anion Gap 4 3 - 14 mmol/L    Glucose 102 (H) 70 - 99 mg/dL    Urea Nitrogen 10 7 - 30 mg/dL    Creatinine 0.90 0.66 - 1.25 mg/dL    GFR  Estimate >90 >60 mL/min/[1.73_m2]    GFR Estimate If Black >90 >60 mL/min/[1.73_m2]    Calcium 9.8 8.5 - 10.1 mg/dL        ASSESSMENT/PLAN:                                                    1. Benign essential hypertension  Poorly controlled.  Much this is likely related to alcohol intake.?  Herbals playing a role-for licorice root hypertension is a common side effect.  Multiple other ingredients the less certain of.   - Basic metabolic panel  - lisinopril-hydrochlorothiazide (PRINZIDE/ZESTORETIC) 20-12.5 MG tablet; Take 1 tablet by mouth daily  Dispense: 30 tablet; Refill: 1    2. Tobacco abuse  Cut back if possible    3. Alcohol dependence with unspecified alcohol-induced disorder (H)  Recommend sobriety.  We attend AA follow-up with his sponsor  Sure is playing a significant role in his blood pressure      Follow up with Provider - 1 mo      Abdiel Kelly MD  [facility]

## 2019-04-30 ENCOUNTER — OFFICE VISIT (OUTPATIENT)
Dept: INTERNAL MEDICINE | Facility: CLINIC | Age: 49
End: 2019-04-30
Payer: COMMERCIAL

## 2019-04-30 VITALS
BODY MASS INDEX: 22.5 KG/M2 | RESPIRATION RATE: 14 BRPM | DIASTOLIC BLOOD PRESSURE: 92 MMHG | OXYGEN SATURATION: 100 % | WEIGHT: 148 LBS | HEART RATE: 62 BPM | TEMPERATURE: 97.7 F | SYSTOLIC BLOOD PRESSURE: 160 MMHG

## 2019-04-30 DIAGNOSIS — F10.29 ALCOHOL DEPENDENCE WITH UNSPECIFIED ALCOHOL-INDUCED DISORDER (H): ICD-10-CM

## 2019-04-30 DIAGNOSIS — I10 BENIGN ESSENTIAL HYPERTENSION: Primary | ICD-10-CM

## 2019-04-30 DIAGNOSIS — Z72.0 TOBACCO ABUSE: ICD-10-CM

## 2019-04-30 LAB
ANION GAP SERPL CALCULATED.3IONS-SCNC: 4 MMOL/L (ref 3–14)
BUN SERPL-MCNC: 10 MG/DL (ref 7–30)
CALCIUM SERPL-MCNC: 9.8 MG/DL (ref 8.5–10.1)
CHLORIDE SERPL-SCNC: 104 MMOL/L (ref 94–109)
CO2 SERPL-SCNC: 28 MMOL/L (ref 20–32)
CREAT SERPL-MCNC: 0.9 MG/DL (ref 0.66–1.25)
GFR SERPL CREATININE-BSD FRML MDRD: >90 ML/MIN/{1.73_M2}
GLUCOSE SERPL-MCNC: 102 MG/DL (ref 70–99)
POTASSIUM SERPL-SCNC: 4.8 MMOL/L (ref 3.4–5.3)
SODIUM SERPL-SCNC: 136 MMOL/L (ref 133–144)

## 2019-04-30 PROCEDURE — 36415 COLL VENOUS BLD VENIPUNCTURE: CPT | Performed by: INTERNAL MEDICINE

## 2019-04-30 PROCEDURE — 99214 OFFICE O/P EST MOD 30 MIN: CPT | Performed by: INTERNAL MEDICINE

## 2019-04-30 PROCEDURE — 80048 BASIC METABOLIC PNL TOTAL CA: CPT | Performed by: INTERNAL MEDICINE

## 2019-04-30 RX ORDER — LISINOPRIL AND HYDROCHLOROTHIAZIDE 12.5; 2 MG/1; MG/1
1 TABLET ORAL DAILY
Qty: 30 TABLET | Refills: 1 | Status: SHIPPED | OUTPATIENT
Start: 2019-04-30 | End: 2019-07-01

## 2019-04-30 RX ORDER — LISINOPRIL AND HYDROCHLOROTHIAZIDE 12.5; 2 MG/1; MG/1
1 TABLET ORAL DAILY
Qty: 30 TABLET | Refills: 1 | Status: SHIPPED | OUTPATIENT
Start: 2019-04-30 | End: 2019-04-30

## 2019-04-30 SDOH — HEALTH STABILITY: MENTAL HEALTH: HOW OFTEN DO YOU HAVE A DRINK CONTAINING ALCOHOL?: 2-3 TIMES A WEEK

## 2019-04-30 SDOH — HEALTH STABILITY: MENTAL HEALTH: HOW OFTEN DO YOU HAVE 6 OR MORE DRINKS ON ONE OCCASION?: WEEKLY

## 2019-04-30 SDOH — HEALTH STABILITY: MENTAL HEALTH: HOW OFTEN DO YOU HAVE SIX OR MORE DRINKS ON ONE OCCASION?: WEEKLY

## 2019-04-30 SDOH — HEALTH STABILITY: MENTAL HEALTH: HOW MANY DRINKS CONTAINING ALCOHOL DO YOU HAVE ON A TYPICAL DAY WHEN YOU ARE DRINKING?: 7 TO 9

## 2019-04-30 SDOH — HEALTH STABILITY: MENTAL HEALTH: HOW MANY STANDARD DRINKS CONTAINING ALCOHOL DO YOU HAVE ON A TYPICAL DAY?: 7 TO 9

## 2019-04-30 NOTE — LETTER
May 1, 2019      Iain Fuentes  [address] [address]        Dear ,    We are writing to inform you of your test results.    Your test results fall within the expected range(s) or remain unchanged from previous results.  Please continue with current treatment plan.    Resulted Orders   Basic metabolic panel   Result Value Ref Range    Sodium 136 133 - 144 mmol/L    Potassium 4.8 3.4 - 5.3 mmol/L    Chloride 104 94 - 109 mmol/L    Carbon Dioxide 28 20 - 32 mmol/L    Anion Gap 4 3 - 14 mmol/L    Glucose 102 (H) 70 - 99 mg/dL    Urea Nitrogen 10 7 - 30 mg/dL    Creatinine 0.90 0.66 - 1.25 mg/dL    GFR Estimate >90 >60 mL/min/[1.73_m2]      Comment:      Non  GFR Calc  Starting 12/18/2018, serum creatinine based estimated GFR (eGFR) will be   calculated using the Chronic Kidney Disease Epidemiology Collaboration   (CKD-EPI) equation.      GFR Estimate If Black >90 >60 mL/min/[1.73_m2]      Comment:       GFR Calc  Starting 12/18/2018, serum creatinine based estimated GFR (eGFR) will be   calculated using the Chronic Kidney Disease Epidemiology Collaboration   (CKD-EPI) equation.      Calcium 9.8 8.5 - 10.1 mg/dL       If you have any questions or concerns, please call the clinic at the number listed above.       Sincerely,        Abdiel Kelly MD

## 2019-04-30 NOTE — PATIENT INSTRUCTIONS
-Cut out the licorice root  -Check out each of your herbals - remove any stimulants and/or ones that may effect BP  -Sobriety is key!!  Contact your AA sponsor and go to a meeting ASAP  -See me in a few weeks

## 2019-05-10 ENCOUNTER — ALLIED HEALTH/NURSE VISIT (OUTPATIENT)
Dept: INTERNAL MEDICINE | Facility: CLINIC | Age: 49
End: 2019-05-10
Payer: COMMERCIAL

## 2019-05-10 VITALS — DIASTOLIC BLOOD PRESSURE: 84 MMHG | SYSTOLIC BLOOD PRESSURE: 136 MMHG

## 2019-05-10 DIAGNOSIS — I10 ESSENTIAL HYPERTENSION, BENIGN: Primary | ICD-10-CM

## 2019-05-10 PROCEDURE — 99207 ZZC NO CHARGE NURSE ONLY: CPT | Performed by: INTERNAL MEDICINE

## 2019-05-10 NOTE — NURSING NOTE
Iain Fuentes was evaluated at [facility] on May 10, 2019 at which time his blood pressure was:    BP Readings from Last 3 Encounters:   05/10/19 136/84   04/30/19 (!) 160/92   04/28/19 (!) 159/98     Pulse Readings from Last 3 Encounters:   04/30/19 62   08/06/18 62   06/01/18 76       Reviewed lifestyle modifications for blood pressure control and reduction: including making healthy food choices, managing weight, getting regular exercise, smoking cessation, reducing alcohol consumption, monitoring blood pressure regularly.     Symptoms: None    BP Goal:< 140/90 mmHg    BP Assessment:  BP at goal    Potential Reasons for BP too high: NA - Not applicable    BP Follow-Up Plan: Recheck BP in 6 months at pharmacy    Recommendation to Provider: none    Note completed by:  Valeria Moser [facility]   [phone]

## 2019-05-30 ENCOUNTER — ALLIED HEALTH/NURSE VISIT (OUTPATIENT)
Dept: INTERNAL MEDICINE | Facility: CLINIC | Age: 49
End: 2019-05-30
Payer: COMMERCIAL

## 2019-05-30 VITALS — DIASTOLIC BLOOD PRESSURE: 86 MMHG | SYSTOLIC BLOOD PRESSURE: 136 MMHG

## 2019-05-30 DIAGNOSIS — I10 ESSENTIAL HYPERTENSION, BENIGN: Primary | ICD-10-CM

## 2019-05-30 PROCEDURE — 99207 ZZC NO CHARGE NURSE ONLY: CPT | Performed by: INTERNAL MEDICINE

## 2019-05-30 NOTE — NURSING NOTE
Iain Fuentes was evaluated at [facility] on May 30, 2019 at which time his blood pressure was:    BP Readings from Last 3 Encounters:   05/30/19 136/86   05/10/19 136/84   04/30/19 (!) 160/92     Pulse Readings from Last 3 Encounters:   04/30/19 62   08/06/18 62   06/01/18 76       Reviewed lifestyle modifications for blood pressure control and reduction: including making healthy food choices, managing weight, getting regular exercise, smoking cessation, reducing alcohol consumption, monitoring blood pressure regularly.     Symptoms: None    BP Goal:< 140/90 mmHg    BP Assessment:  BP at goal    Potential Reasons for BP too high: NA - Not applicable    BP Follow-Up Plan: Recheck BP in 6 months at pharmacy    Recommendation to Provider: none    Note completed by:  Valeria Moser [facility]   [phone]

## 2019-07-01 DIAGNOSIS — I10 BENIGN ESSENTIAL HYPERTENSION: ICD-10-CM

## 2019-07-02 ENCOUNTER — TELEPHONE (OUTPATIENT)
Dept: INTERNAL MEDICINE | Facility: CLINIC | Age: 49
End: 2019-07-02

## 2019-07-02 RX ORDER — LISINOPRIL AND HYDROCHLOROTHIAZIDE 12.5; 2 MG/1; MG/1
TABLET ORAL
Qty: 30 TABLET | Refills: 1 | Status: SHIPPED | OUTPATIENT
Start: 2019-07-02 | End: 2019-09-05

## 2019-07-02 NOTE — TELEPHONE ENCOUNTER
Prescription approved per [facility] Refill Protocol.    Sandra PUGH RN, BSN, PHN

## 2019-07-02 NOTE — TELEPHONE ENCOUNTER
"Requested Prescriptions   Pending Prescriptions Disp Refills     lisinopril-hydrochlorothiazide (PRINZIDE/ZESTORETIC) 20-12.5 MG tablet [Pharmacy Med Name: LISINOPRIL-HYDROCHLORO 20-12.5 TABS] 30 tablet 1     Sig: TAKE ONE TABLET BY MOUTH ONCE DAILY       Diuretics (Including Combos) Protocol Passed - 7/1/2019  3:52 PM        Passed - Blood pressure under 140/90 in past 12 months     BP Readings from Last 3 Encounters:   05/30/19 136/86   05/10/19 136/84   04/30/19 (!) 160/92                 Passed - Recent (12 mo) or future (30 days) visit within the authorizing provider's specialty     Patient had office visit in the last 12 months or has a visit in the next 30 days with authorizing provider or within the authorizing provider's specialty.  See \"Patient Info\" tab in inbasket, or \"Choose Columns\" in Meds & Orders section of the refill encounter.              Passed - Medication is active on med list        Passed - Patient is age 18 or older        Passed - Normal serum creatinine on file in past 12 months     Recent Labs   Lab Test 04/30/19  1012   CR 0.90              Passed - Normal serum potassium on file in past 12 months     Recent Labs   Lab Test 04/30/19  1012   POTASSIUM 4.8                    Passed - Normal serum sodium on file in past 12 months     Recent Labs   Lab Test 04/30/19  1012                 Last Written Prescription Date:  4/30/19  Last Fill Quantity: 30,  # refills: 1   Last office visit: 4/30/2019 with prescribing provider:  4/30/19   Future Office Visit:      "

## 2019-07-02 NOTE — TELEPHONE ENCOUNTER
Patient called needing a prescription for lisinopril 20mgs with hdl uses [facility] pharmacy please call patient regarding this phone [phone]

## 2019-07-02 NOTE — TELEPHONE ENCOUNTER
Refill already approved earlier today and sent to pharmacy. Patient informed to call pharmacy to check on availability for pickup.  Ninoska Giordano CMA on 7/2/2019 at 5:19 PM

## 2019-09-05 DIAGNOSIS — I10 BENIGN ESSENTIAL HYPERTENSION: ICD-10-CM

## 2019-09-05 NOTE — LETTER
[facility]  [address] [id number] [phone]            Iain Fuentes  [address] [place] [id number]        September 6, 2019    Dear Iain,    While refilling your prescription today, we noticed that you are due for an appointment with your provider.  We will refill your prescription for 30 days, but a follow-up appointment must be made before any additional refills can be approved.     Taking care of your health is important to us and we look forward to seeing you in the near future.  Please call us at [phone] or [phone] (or use [employer]) to schedule an appointment.     Please disregard this notice if you have already made an appointment.    Sincerely,        [facility]

## 2019-09-06 RX ORDER — LISINOPRIL AND HYDROCHLOROTHIAZIDE 12.5; 2 MG/1; MG/1
TABLET ORAL
Qty: 30 TABLET | Refills: 1 | Status: SHIPPED | OUTPATIENT
Start: 2019-09-06 | End: 2019-10-28

## 2019-09-06 NOTE — TELEPHONE ENCOUNTER
"Requested Prescriptions   Pending Prescriptions Disp Refills     lisinopril-hydrochlorothiazide (PRINZIDE/ZESTORETIC) 20-12.5 MG tablet [Pharmacy Med Name: LISINOPRIL-HYDROCHLORO 20-12.5 TABS] 30 tablet 1     Sig: TAKE ONE TABLET BY MOUTH ONCE DAILY   Last Written Prescription Date:  7/2/2019  Last Fill Quantity: 30,  # refills: 1   Last Office Visit: 4/30/2019   Future Office Visit:         Diuretics (Including Combos) Protocol Passed - 9/5/2019  2:45 PM        Passed - Blood pressure under 140/90 in past 12 months     BP Readings from Last 3 Encounters:   05/30/19 136/86   05/10/19 136/84   04/30/19 (!) 160/92                 Passed - Recent (12 mo) or future (30 days) visit within the authorizing provider's specialty     Patient had office visit in the last 12 months or has a visit in the next 30 days with authorizing provider or within the authorizing provider's specialty.  See \"Patient Info\" tab in inbasket, or \"Choose Columns\" in Meds & Orders section of the refill encounter.              Passed - Medication is active on med list        Passed - Patient is age 18 or older        Passed - Normal serum creatinine on file in past 12 months     Recent Labs   Lab Test 04/30/19  1012   CR 0.90              Passed - Normal serum potassium on file in past 12 months     Recent Labs   Lab Test 04/30/19  1012   POTASSIUM 4.8                    Passed - Normal serum sodium on file in past 12 months     Recent Labs   Lab Test 04/30/19  1012                   "

## 2019-09-06 NOTE — TELEPHONE ENCOUNTER
Prescription approved per [facility] Refill Protocol.    Due for routine physical and BP recheck    Sandra PUGH RN, BSN, PHN

## 2019-10-02 ENCOUNTER — ALLIED HEALTH/NURSE VISIT (OUTPATIENT)
Dept: INTERNAL MEDICINE | Facility: CLINIC | Age: 49
End: 2019-10-02
Payer: COMMERCIAL

## 2019-10-02 VITALS — SYSTOLIC BLOOD PRESSURE: 129 MMHG | DIASTOLIC BLOOD PRESSURE: 81 MMHG

## 2019-10-02 DIAGNOSIS — I10 ESSENTIAL HYPERTENSION, BENIGN: Primary | ICD-10-CM

## 2019-10-02 PROCEDURE — 99207 ZZC NO CHARGE NURSE ONLY: CPT | Performed by: INTERNAL MEDICINE

## 2019-10-02 NOTE — PROGRESS NOTES
Iain Fuentes was evaluated at [facility] on October 2, 2019 at which time his blood pressure was:    BP Readings from Last 3 Encounters:   10/02/19 129/81   05/30/19 136/86   05/10/19 136/84     Pulse Readings from Last 3 Encounters:   04/30/19 62   08/06/18 62   06/01/18 76       Reviewed lifestyle modifications for blood pressure control and reduction: including making healthy food choices, managing weight, getting regular exercise, smoking cessation, reducing alcohol consumption, monitoring blood pressure regularly.     Symptoms: None    BP Goal:< 140/90 mmHg    BP Assessment:  BP at goal    Potential Reasons for BP too high: NA - Not applicable    BP Follow-Up Plan: Recheck BP in 6 months at pharmacy    Recommendation to Provider: continue to monitor    Note completed by: nadja Perez pharmacist

## 2019-10-28 DIAGNOSIS — I10 BENIGN ESSENTIAL HYPERTENSION: ICD-10-CM

## 2019-10-28 RX ORDER — LISINOPRIL AND HYDROCHLOROTHIAZIDE 12.5; 2 MG/1; MG/1
TABLET ORAL
Qty: 30 TABLET | Refills: 1 | Status: SHIPPED | OUTPATIENT
Start: 2019-10-28 | End: 2020-01-09

## 2019-10-28 NOTE — TELEPHONE ENCOUNTER
"Requested Prescriptions   Pending Prescriptions Disp Refills     lisinopril-hydrochlorothiazide (PRINZIDE/ZESTORETIC) 20-12.5 MG tablet [Pharmacy Med Name: LISINOPRIL-HYDROCHLORO 20-12.5 TABS] 30 tablet 1     Sig: TAKE ONE TABLET BY MOUTH ONCE DAILY       Diuretics (Including Combos) Protocol Passed - 10/28/2019  3:10 PM        Passed - Blood pressure under 140/90 in past 12 months     BP Readings from Last 3 Encounters:   10/02/19 129/81   05/30/19 136/86   05/10/19 136/84                 Passed - Recent (12 mo) or future (30 days) visit within the authorizing provider's specialty     Patient has had an office visit with the authorizing provider or a provider within the authorizing providers department within the previous 12 mos or has a future within next 30 days. See \"Patient Info\" tab in inbasket, or \"Choose Columns\" in Meds & Orders section of the refill encounter.              Passed - Medication is active on med list        Passed - Patient is age 18 or older        Passed - Normal serum creatinine on file in past 12 months     Recent Labs   Lab Test 04/30/19  1012   CR 0.90              Passed - Normal serum potassium on file in past 12 months     Recent Labs   Lab Test 04/30/19  1012   POTASSIUM 4.8                    Passed - Normal serum sodium on file in past 12 months     Recent Labs   Lab Test 04/30/19  1012                 Prescription approved per [facility] Refill Protocol.    Sandra PUGH RN, BSN, PHN      "

## 2020-01-09 ENCOUNTER — ALLIED HEALTH/NURSE VISIT (OUTPATIENT)
Dept: INTERNAL MEDICINE | Facility: CLINIC | Age: 50
End: 2020-01-09
Payer: COMMERCIAL

## 2020-01-09 VITALS — DIASTOLIC BLOOD PRESSURE: 88 MMHG | SYSTOLIC BLOOD PRESSURE: 136 MMHG

## 2020-01-09 DIAGNOSIS — I10 ESSENTIAL HYPERTENSION, BENIGN: Primary | ICD-10-CM

## 2020-01-09 PROCEDURE — 99207 ZZC NO CHARGE NURSE ONLY: CPT | Performed by: INTERNAL MEDICINE

## 2020-01-09 NOTE — PROGRESS NOTES
Iain Fuentes was evaluated at [facility] on January 9, 2020 at which time his blood pressure was:    BP Readings from Last 3 Encounters:   01/09/20 136/88   10/02/19 129/81   05/30/19 136/86     Pulse Readings from Last 3 Encounters:   04/30/19 62   08/06/18 62   06/01/18 76       Reviewed lifestyle modifications for blood pressure control and reduction: including making healthy food choices, managing weight, getting regular exercise, smoking cessation, reducing alcohol consumption, monitoring blood pressure regularly.     Symptoms: None    BP Goal:< 140/90 mmHg    BP Assessment:  BP at goal    Potential Reasons for BP too high: NA - Not applicable    BP Follow-Up Plan: Recheck BP in 6 months at pharmacy    Recommendation to Provider: Continue current therapy    Note completed by: Pola Rubi, PharmD  [facility]  [phone]

## 2020-04-21 DIAGNOSIS — I10 BENIGN ESSENTIAL HYPERTENSION: ICD-10-CM

## 2020-04-21 NOTE — TELEPHONE ENCOUNTER
Routing to TC pool for scheduling, please route back to RN pool once scheduled so RN can issue a tena.

## 2020-04-22 RX ORDER — LISINOPRIL AND HYDROCHLOROTHIAZIDE 12.5; 2 MG/1; MG/1
TABLET ORAL
Qty: 30 TABLET | Refills: 0 | Status: SHIPPED | OUTPATIENT
Start: 2020-04-22 | End: 2020-04-23

## 2020-04-23 ENCOUNTER — VIRTUAL VISIT (OUTPATIENT)
Dept: INTERNAL MEDICINE | Facility: CLINIC | Age: 50
End: 2020-04-23
Payer: COMMERCIAL

## 2020-04-23 VITALS — TEMPERATURE: 98.6 F | WEIGHT: 138 LBS | BODY MASS INDEX: 20.92 KG/M2 | HEIGHT: 68 IN

## 2020-04-23 DIAGNOSIS — I10 BENIGN ESSENTIAL HYPERTENSION: ICD-10-CM

## 2020-04-23 PROCEDURE — 99214 OFFICE O/P EST MOD 30 MIN: CPT | Mod: TEL | Performed by: INTERNAL MEDICINE

## 2020-04-23 RX ORDER — LISINOPRIL AND HYDROCHLOROTHIAZIDE 12.5; 2 MG/1; MG/1
1 TABLET ORAL DAILY
Qty: 30 TABLET | Refills: 0 | Status: SHIPPED | OUTPATIENT
Start: 2020-04-23 | End: 2020-06-12

## 2020-04-23 RX ORDER — AMLODIPINE BESYLATE 5 MG/1
5 TABLET ORAL AT BEDTIME
Qty: 30 TABLET | Refills: 0 | Status: SHIPPED | OUTPATIENT
Start: 2020-04-23 | End: 2020-06-12

## 2020-04-23 ASSESSMENT — MIFFLIN-ST. JEOR: SCORE: 1465.46

## 2020-04-23 NOTE — PROGRESS NOTES
"Iain Fuentes is a 49 year old male who is being evaluated via a billable telephone visit.      The patient has been notified of following:     \"This telephone visit will be conducted via a call between you and your physician/provider. We have found that certain health care needs can be provided without the need for a physical exam.  This service lets us provide the care you need with a short phone conversation.  If a prescription is necessary we can send it directly to your pharmacy.  If lab work is needed we can place an order for that and you can then stop by our lab to have the test done at a later time.    Telephone visits are billed at different rates depending on your insurance coverage. During this emergency period, for some insurers they may be billed the same as an in-person visit.  Please reach out to your insurance provider with any questions.    If during the course of the call the physician/provider feels a telephone visit is not appropriate, you will not be charged for this service.\"    Patient has given verbal consent for Telephone visit?  Yes    How would you like to obtain your AVS? Mail a copy    Subjective     Iain Fuentes is a 49 year old male who presents to clinic today for the following health issues:    HPI  Hypertension Follow-up      Do you check your blood pressure regularly outside of the clinic? Yes     Are you following a low salt diet? Yes    Are your blood pressures ever more than 140 on the top number (systolic) OR more   than 90 on the bottom number (diastolic), for example 140/90? Yes, a few times, average reading is 138/90      How many servings of fruits and vegetables do you eat daily? 1-2    On average, how many sweetened beverages do you drink each day (Examples: soda, juice, sweet tea, etc.  Do NOT count diet or artificially sweetened beverages)? 1    How many days per week do you exercise enough to make your heart beat faster? 7    How many minutes a day do you " "exercise enough to make your heart beat faster? 30 - 60    How many days per week do you miss taking your medication? 0                Reviewed and updated as needed this visit by Provider  Tobacco  Allergies  Meds  Problems  Med Hx  Surg Hx  Fam Hx         Review of Systems   ROS COMP: Constitutional, HEENT, cardiovascular, pulmonary, gi and gu systems are negative, except as otherwise noted.       Objective   Reported vitals:  Temp 98.6  F (37  C) (Oral)   Ht 1.727 m (5' 8\")   Wt 62.6 kg (138 lb)   BMI 20.98 kg/m     alert and no distress  PSYCH: Alert and oriented times 3; coherent speech, normal   rate and volume, able to articulate logical thoughts, able   to abstract reason, no tangential thoughts, no hallucinations   or delusions  His affect is normal  RESP: No cough, no audible wheezing, able to talk in full sentences  Remainder of exam unable to be completed due to telephone visits    Labs reviewed in Epic        Assessment/Plan:  1. Benign essential hypertension  Start amlodipine   F/u curbside BP check then tele/video visit 4 wks  - amLODIPine (NORVASC) 5 MG tablet; Take 1 tablet (5 mg) by mouth At Bedtime  Dispense: 30 tablet; Refill: 0  - lisinopril-hydrochlorothiazide (ZESTORETIC) 20-12.5 MG tablet; Take 1 tablet by mouth daily  Dispense: 30 tablet; Refill: 0    Return in about 4 weeks (around 5/21/2020) for Curbside BP check in 2-4 wks then video visit afterwards, BP Recheck.      Phone call duration:  12 minutes    Abdiel Kelly MD        "

## 2020-05-18 DIAGNOSIS — I10 BENIGN ESSENTIAL HYPERTENSION: ICD-10-CM

## 2020-05-25 RX ORDER — LISINOPRIL 10 MG/1
TABLET ORAL
Qty: 30 TABLET | Refills: 11 | OUTPATIENT
Start: 2020-05-25

## 2020-05-27 ENCOUNTER — ALLIED HEALTH/NURSE VISIT (OUTPATIENT)
Dept: INTERNAL MEDICINE | Facility: CLINIC | Age: 50
End: 2020-05-27

## 2020-05-27 VITALS — DIASTOLIC BLOOD PRESSURE: 88 MMHG | SYSTOLIC BLOOD PRESSURE: 138 MMHG

## 2020-05-27 DIAGNOSIS — I10 ESSENTIAL HYPERTENSION, BENIGN: Primary | ICD-10-CM

## 2020-05-27 PROCEDURE — 99207 ZZC NO CHARGE NURSE ONLY: CPT | Performed by: INTERNAL MEDICINE

## 2020-05-27 NOTE — NURSING NOTE
Iain Fuentes was evaluated at [facility] on May 27, 2020 at which time his blood pressure was:    BP Readings from Last 3 Encounters:   05/27/20 138/88   01/09/20 136/88   10/02/19 129/81     Pulse Readings from Last 3 Encounters:   04/30/19 62   08/06/18 62   06/01/18 76       Reviewed lifestyle modifications for blood pressure control and reduction: including making healthy food choices, managing weight, getting regular exercise, smoking cessation, reducing alcohol consumption, monitoring blood pressure regularly.     Symptoms: None    BP Goal:< 140/90 mmHg    BP Assessment:  BP at goal    Potential Reasons for BP too high: NA - Not applicable    BP Follow-Up Plan: Recheck BP in 6 months at pharmacy    Recommendation to Provider: Continue current therapy    Note completed by: Pola Rubi, PharmD  [facility]  [phone]

## 2020-06-02 DIAGNOSIS — I10 BENIGN ESSENTIAL HYPERTENSION: ICD-10-CM

## 2020-06-02 RX ORDER — LISINOPRIL AND HYDROCHLOROTHIAZIDE 12.5; 2 MG/1; MG/1
1 TABLET ORAL DAILY
Qty: 30 TABLET | Refills: 0 | Status: CANCELLED | OUTPATIENT
Start: 2020-06-02

## 2020-06-02 NOTE — TELEPHONE ENCOUNTER
Pt calling for refill of Lisinopril, he sates  Pharmacy has been trying to reach the provider with no luck. Please call pt to advise.       Thank you,   Silvestre CAMPUZANO   [facility]  [phone]

## 2020-06-03 NOTE — TELEPHONE ENCOUNTER
Before we Fill- at 5/27 BP check was he still taking BOTH BP meds?  If not- we need to refill both and recheck BP  If so- I'll change dose of lis/hctz and send in.    [facility] then has to do the f/u virtual visit- that's why no RF given as of yet.

## 2020-06-12 RX ORDER — AMLODIPINE BESYLATE 5 MG/1
5 TABLET ORAL AT BEDTIME
Qty: 30 TABLET | Refills: 0 | Status: SHIPPED | OUTPATIENT
Start: 2020-06-12 | End: 2020-07-06

## 2020-06-12 RX ORDER — LISINOPRIL AND HYDROCHLOROTHIAZIDE 12.5; 2 MG/1; MG/1
1 TABLET ORAL DAILY
Qty: 30 TABLET | Refills: 0 | Status: SHIPPED | OUTPATIENT
Start: 2020-06-12 | End: 2020-07-06

## 2020-06-12 NOTE — TELEPHONE ENCOUNTER
Patient was only taking amlodipine at the time of BP check. Scheduled him for curbside BP on Friday, 7/3 at 9AM and video visit with you on Monday, 7/6 at 3PM.    BERLIN Andrews

## 2020-06-12 NOTE — TELEPHONE ENCOUNTER
If checks home BP -set up for virtual visit. otherwise curbside BP then virtual.  MAY ALREADY BE SCHEDULED. double check. thanks

## 2020-07-02 NOTE — PROGRESS NOTES
Patient consents to receive outdoor care: Yes  If we are unable to safely and ergonomically able to provide care- is the patient able to safely able to get out of car and transfer to a chair? Yes    Patient would like to receive their AVS via mail.      I met with Iain Fuentes at the request of  to recheck his blood pressure.  Blood pressure medications on the med list were reviewed with patient.    Patient has taken all medications as per usual regimen: yes  Patient reports tolerating them without any issues or concerns: yes      There were no vitals filed for this visit.    Blood pressure was taken, previous encounter was reviewed, recorded blood pressure below 140/90. Patient was discharged and the note will be sent to the provider for final review.    Blood pressure was taken, previous encounter was reviewed, recorded blood pressure below 140/90. Patient was discharged and the note will be sent to the provider for final review.  Magdalena Peres, CMA

## 2020-07-03 ENCOUNTER — ALLIED HEALTH/NURSE VISIT (OUTPATIENT)
Dept: NURSING | Facility: CLINIC | Age: 50
End: 2020-07-03

## 2020-07-03 VITALS — DIASTOLIC BLOOD PRESSURE: 75 MMHG | SYSTOLIC BLOOD PRESSURE: 122 MMHG | HEART RATE: 68 BPM | OXYGEN SATURATION: 98 %

## 2020-07-03 DIAGNOSIS — I10 ESSENTIAL HYPERTENSION, BENIGN: Primary | ICD-10-CM

## 2020-07-03 PROCEDURE — 99207 ZZC NO CHARGE NURSE ONLY: CPT

## 2020-07-06 ENCOUNTER — VIRTUAL VISIT (OUTPATIENT)
Dept: INTERNAL MEDICINE | Facility: CLINIC | Age: 50
End: 2020-07-06
Payer: COMMERCIAL

## 2020-07-06 DIAGNOSIS — I10 BENIGN ESSENTIAL HYPERTENSION: Primary | ICD-10-CM

## 2020-07-06 DIAGNOSIS — Z13.220 LIPID SCREENING: ICD-10-CM

## 2020-07-06 PROCEDURE — 99213 OFFICE O/P EST LOW 20 MIN: CPT | Mod: GT | Performed by: INTERNAL MEDICINE

## 2020-07-06 RX ORDER — LISINOPRIL AND HYDROCHLOROTHIAZIDE 12.5; 2 MG/1; MG/1
1 TABLET ORAL DAILY
Qty: 90 TABLET | Refills: 1 | Status: SHIPPED | OUTPATIENT
Start: 2020-07-06 | End: 2020-11-05

## 2020-07-06 RX ORDER — AMLODIPINE BESYLATE 5 MG/1
5 TABLET ORAL AT BEDTIME
Qty: 90 TABLET | Refills: 1 | Status: SHIPPED | OUTPATIENT
Start: 2020-07-06 | End: 2021-02-25

## 2020-07-06 NOTE — PROGRESS NOTES
"Iain Fuentes is a 49 year old male who is being evaluated via a billable video visit.      The patient has been notified of following:     \"This video visit will be conducted via a call between you and your physician/provider. We have found that certain health care needs can be provided without the need for an in-person physical exam.  This service lets us provide the care you need with a video conversation.  If a prescription is necessary we can send it directly to your pharmacy.  If lab work is needed we can place an order for that and you can then stop by our lab to have the test done at a later time.    Video visits are billed at different rates depending on your insurance coverage.  Please reach out to your insurance provider with any questions.    If during the course of the call the physician/provider feels a video visit is not appropriate, you will not be charged for this service.\"    Patient has given verbal consent for Video visit? Yes  How would you like to obtain your AVS? Mail a copy  Patient would like the video invitation sent by: Text to cell phone: [phone]  Will anyone else be joining your video visit? No      Subjective     Iain Fuentes is a 49 year old male who presents today via video visit for the following health issues:    HPI  Hypertension Follow-up      Do you check your blood pressure regularly outside of the clinic? No     Are you following a low salt diet? Yes    Are your blood pressures ever more than 140 on the top number (systolic) OR more   than 90 on the bottom number (diastolic), for example 140/90? No 122/75 on 7-3-20      How many servings of fruits and vegetables do you eat daily?  2-3    On average, how many sweetened beverages do you drink each day (Examples: soda, juice, sweet tea, etc.  Do NOT count diet or artificially sweetened beverages)?   1    How many days per week do you exercise enough to make your heart beat faster? 3 or less    How many minutes a " day do you exercise enough to make your heart beat faster? 30 - 60    How many days per week do you miss taking your medication? 0        Video Start Time: 301            Reviewed and updated as needed this visit by Provider         Review of Systems         Objective             Physical Exam     GENERAL: Healthy, alert and no distress  EYES: Eyes grossly normal to inspection.  No discharge or erythema, or obvious scleral/conjunctival abnormalities.  RESP: No audible wheeze, cough, or visible cyanosis.  No visible retractions or increased work of breathing.    SKIN: Visible skin clear. No significant rash, abnormal pigmentation or lesions.  NEURO: Cranial nerves grossly intact.  Mentation and speech appropriate for age.  PSYCH: Mentation appears normal, affect normal/bright, judgement and insight intact, normal speech and appearance well-groomed.      Labs reviewed in Epic        Assessment & Plan     1. Benign essential hypertension  Much better  Cont meds. Focus on alcohol use  - **Basic metabolic panel FUTURE anytime; Future  - amLODIPine (NORVASC) 5 MG tablet; Take 1 tablet (5 mg) by mouth At Bedtime  Dispense: 90 tablet; Refill: 1  - lisinopril-hydrochlorothiazide (ZESTORETIC) 20-12.5 MG tablet; Take 1 tablet by mouth daily  Dispense: 90 tablet; Refill: 1    2. Lipid screening  - Lipid panel reflex to direct LDL Fasting; Future     Tobacco Cessation:   reports that he has been smoking cigarettes. He has a 20.00 pack-year smoking history. He has never used smokeless tobacco.  Tobacco Cessation Action Plan: Information offered: Patient not interested at this time        Regular exercise  See Patient Instructions    Return in about 6 months (around 1/6/2021) for Wellness Visit with labs before, BP Recheck.    Abdiel Kelly MD  [facility]      Video-Visit Details    Type of service:  Video Visit    Video End Time:3:15 PM    Originating Location (pt. Location): Home    Distant  Location (provider location):  [facility]     Platform used for Video Visit: AmWell    Return in about 6 months (around 1/6/2021) for Wellness Visit with labs before, BP Recheck.       Abdiel Kelly MD

## 2020-11-05 DIAGNOSIS — I10 BENIGN ESSENTIAL HYPERTENSION: ICD-10-CM

## 2020-11-05 RX ORDER — LISINOPRIL AND HYDROCHLOROTHIAZIDE 12.5; 2 MG/1; MG/1
1 TABLET ORAL DAILY
Qty: 30 TABLET | Refills: 0 | Status: SHIPPED | OUTPATIENT
Start: 2020-11-05 | End: 2021-02-25

## 2020-11-05 NOTE — TELEPHONE ENCOUNTER
Routing refill request to provider for review/approval because:  Labs not current:  [place]ALL[place] Bird, Na

## 2020-11-05 NOTE — LETTER
November 10, 2020    Iain Fuentes  [address] [address]        Dear Iain,    While refilling your prescription today, we noticed that you are due to have LABS DRAWN.  We will refill your prescription for 30 days, but that appointment must be made before any additional refills can be approved.     Taking care of your health is important to us and we look forward to seeing you in the near future.  Please call us at [phone] or [phone] (or use [employer]) to schedule.  Please disregard this notice if you have already made an appointment.        Sincerely,          [facility] Team

## 2020-11-06 NOTE — TELEPHONE ENCOUNTER
Labs overdue    BP Readings from Last 3 Encounters:   07/03/20 122/75   05/27/20 138/88   01/09/20 136/88

## 2021-02-25 ENCOUNTER — OFFICE VISIT (OUTPATIENT)
Dept: INTERNAL MEDICINE | Facility: CLINIC | Age: 51
End: 2021-02-25
Payer: COMMERCIAL

## 2021-02-25 VITALS
SYSTOLIC BLOOD PRESSURE: 126 MMHG | DIASTOLIC BLOOD PRESSURE: 66 MMHG | BODY MASS INDEX: 21.57 KG/M2 | WEIGHT: 142.3 LBS | HEART RATE: 93 BPM | HEIGHT: 68 IN | OXYGEN SATURATION: 99 % | TEMPERATURE: 97.3 F

## 2021-02-25 DIAGNOSIS — Z12.11 SCREEN FOR COLON CANCER: ICD-10-CM

## 2021-02-25 DIAGNOSIS — F10.20 UNCOMPLICATED ALCOHOL DEPENDENCE (H): ICD-10-CM

## 2021-02-25 DIAGNOSIS — Z12.5 PROSTATE CANCER SCREENING: ICD-10-CM

## 2021-02-25 DIAGNOSIS — I10 ESSENTIAL HYPERTENSION, BENIGN: ICD-10-CM

## 2021-02-25 DIAGNOSIS — Z00.00 ROUTINE GENERAL MEDICAL EXAMINATION AT A HEALTH CARE FACILITY: Primary | ICD-10-CM

## 2021-02-25 DIAGNOSIS — Z11.59 NEED FOR HEPATITIS C SCREENING TEST: ICD-10-CM

## 2021-02-25 DIAGNOSIS — N52.9 IMPOTENCE OF ORGANIC ORIGIN: ICD-10-CM

## 2021-02-25 DIAGNOSIS — Z13.1 SCREENING FOR DIABETES MELLITUS: ICD-10-CM

## 2021-02-25 DIAGNOSIS — Z13.220 LIPID SCREENING: ICD-10-CM

## 2021-02-25 DIAGNOSIS — I10 BENIGN ESSENTIAL HYPERTENSION: ICD-10-CM

## 2021-02-25 LAB
ANION GAP SERPL CALCULATED.3IONS-SCNC: 7 MMOL/L (ref 3–14)
BUN SERPL-MCNC: 27 MG/DL (ref 7–30)
CALCIUM SERPL-MCNC: 9.7 MG/DL (ref 8.5–10.1)
CHLORIDE SERPL-SCNC: 102 MMOL/L (ref 94–109)
CHOLEST SERPL-MCNC: 191 MG/DL
CO2 SERPL-SCNC: 27 MMOL/L (ref 20–32)
CREAT SERPL-MCNC: 0.99 MG/DL (ref 0.66–1.25)
GFR SERPL CREATININE-BSD FRML MDRD: 88 ML/MIN/{1.73_M2}
GLUCOSE SERPL-MCNC: 104 MG/DL (ref 70–99)
HCV AB SERPL QL IA: NONREACTIVE
HDLC SERPL-MCNC: 91 MG/DL
LDLC SERPL CALC-MCNC: 88 MG/DL
NONHDLC SERPL-MCNC: 100 MG/DL
POTASSIUM SERPL-SCNC: 4 MMOL/L (ref 3.4–5.3)
PSA SERPL-ACNC: 1.74 UG/L (ref 0–4)
SODIUM SERPL-SCNC: 136 MMOL/L (ref 133–144)
TRIGL SERPL-MCNC: 61 MG/DL

## 2021-02-25 PROCEDURE — 90682 RIV4 VACC RECOMBINANT DNA IM: CPT | Performed by: INTERNAL MEDICINE

## 2021-02-25 PROCEDURE — G0103 PSA SCREENING: HCPCS | Performed by: INTERNAL MEDICINE

## 2021-02-25 PROCEDURE — 99214 OFFICE O/P EST MOD 30 MIN: CPT | Mod: 25 | Performed by: INTERNAL MEDICINE

## 2021-02-25 PROCEDURE — 90471 IMMUNIZATION ADMIN: CPT | Performed by: INTERNAL MEDICINE

## 2021-02-25 PROCEDURE — 90732 PPSV23 VACC 2 YRS+ SUBQ/IM: CPT | Performed by: INTERNAL MEDICINE

## 2021-02-25 PROCEDURE — 80061 LIPID PANEL: CPT | Performed by: INTERNAL MEDICINE

## 2021-02-25 PROCEDURE — 90472 IMMUNIZATION ADMIN EACH ADD: CPT | Performed by: INTERNAL MEDICINE

## 2021-02-25 PROCEDURE — 99396 PREV VISIT EST AGE 40-64: CPT | Mod: 25 | Performed by: INTERNAL MEDICINE

## 2021-02-25 PROCEDURE — 80048 BASIC METABOLIC PNL TOTAL CA: CPT | Performed by: INTERNAL MEDICINE

## 2021-02-25 PROCEDURE — 86803 HEPATITIS C AB TEST: CPT | Performed by: INTERNAL MEDICINE

## 2021-02-25 PROCEDURE — 36415 COLL VENOUS BLD VENIPUNCTURE: CPT | Performed by: INTERNAL MEDICINE

## 2021-02-25 PROCEDURE — 90750 HZV VACC RECOMBINANT IM: CPT | Performed by: INTERNAL MEDICINE

## 2021-02-25 RX ORDER — AMLODIPINE BESYLATE 5 MG/1
5 TABLET ORAL AT BEDTIME
Qty: 90 TABLET | Refills: 3 | Status: SHIPPED | OUTPATIENT
Start: 2021-02-25 | End: 2021-06-21

## 2021-02-25 RX ORDER — TADALAFIL 20 MG/1
TABLET ORAL
Qty: 6 TABLET | Refills: 11 | Status: SHIPPED | OUTPATIENT
Start: 2021-02-25 | End: 2023-01-01

## 2021-02-25 RX ORDER — LISINOPRIL AND HYDROCHLOROTHIAZIDE 12.5; 2 MG/1; MG/1
1 TABLET ORAL DAILY
Qty: 90 TABLET | Refills: 3 | Status: SHIPPED | OUTPATIENT
Start: 2021-02-25 | End: 2022-03-07

## 2021-02-25 SDOH — HEALTH STABILITY: MENTAL HEALTH: HOW MANY STANDARD DRINKS CONTAINING ALCOHOL DO YOU HAVE ON A TYPICAL DAY?: 3 OR 4

## 2021-02-25 ASSESSMENT — MIFFLIN-ST. JEOR: SCORE: 1479.97

## 2021-02-25 NOTE — NURSING NOTE
"Chief Complaint   Patient presents with     Physical     /66   Pulse 93   Temp 97.3  F (36.3  C) (Temporal)   Ht 1.727 m (5' 8\")   Wt 64.5 kg (142 lb 4.8 oz)   SpO2 99%   BMI 21.64 kg/m   Estimated body mass index is 21.64 kg/m  as calculated from the following:    Height as of this encounter: 1.727 m (5' 8\").    Weight as of this encounter: 64.5 kg (142 lb 4.8 oz).        Health Maintenance due pending provider review:  Colonoscopy/FIT-order pended    Vaccines, will do today    Cortney Pires CMA  "

## 2021-02-25 NOTE — LETTER
April 15, 2021      Iain Fuentes  [address] [address]        Dear ,    We are writing to inform you of your test results.    Your lab results are normal.      Resulted Orders   Prostate spec antigen screen   Result Value Ref Range    PSA 1.74 0 - 4 ug/L      Comment:      Assay Method:  Chemiluminescence using Siemens Vista analyzer   Lipid panel reflex to direct LDL Fasting   Result Value Ref Range    Cholesterol 191 <200 mg/dL    Triglycerides 61 <150 mg/dL    HDL Cholesterol 91 >39 mg/dL    LDL Cholesterol Calculated 88 <100 mg/dL      Comment:      Desirable:       <100 mg/dl    Non HDL Cholesterol 100 <130 mg/dL   Basic metabolic panel   Result Value Ref Range    Sodium 136 133 - 144 mmol/L    Potassium 4.0 3.4 - 5.3 mmol/L    Chloride 102 94 - 109 mmol/L    Carbon Dioxide 27 20 - 32 mmol/L    Anion Gap 7 3 - 14 mmol/L    Glucose 104 (H) 70 - 99 mg/dL    Urea Nitrogen 27 7 - 30 mg/dL    Creatinine 0.99 0.66 - 1.25 mg/dL    GFR Estimate 88 >60 mL/min/[1.73_m2]      Comment:      Non  GFR Calc  Starting 12/18/2018, serum creatinine based estimated GFR (eGFR) will be   calculated using the Chronic Kidney Disease Epidemiology Collaboration   (CKD-EPI) equation.      GFR Estimate If Black >90 >60 mL/min/[1.73_m2]      Comment:       GFR Calc  Starting 12/18/2018, serum creatinine based estimated GFR (eGFR) will be   calculated using the Chronic Kidney Disease Epidemiology Collaboration   (CKD-EPI) equation.      Calcium 9.7 8.5 - 10.1 mg/dL   Hepatitis C Screen Reflex to HCV RNA Quant and Genotype   Result Value Ref Range    Hepatitis C Antibody Nonreactive NR^Nonreactive      Comment:      Assay performance characteristics have not been established for newborns,   infants, and children     COLOGUARD(EXACT SCIENCES)   Result Value Ref Range    COLOGUARD-ABSTRACT Negative        If you have any questions or concerns, please call the clinic  at the number listed above.       Sincerely,      Abdiel Kelly MD

## 2021-02-25 NOTE — PATIENT INSTRUCTIONS
Preventive Health Recommendations  Male Ages 50 - 64    Yearly exam:             See your health care provider every year in order to  o   Review health changes.   o   Discuss preventive care.    o   Review your medicines if your doctor has prescribed any.     Have a cholesterol test every 5 years, or more frequently if you are at risk for high cholesterol/heart disease.     Have a diabetes test (fasting glucose) every three years. If you are at risk for diabetes, you should have this test more often.     Have a colonoscopy at age 50, or have a yearly FIT test (stool test). These exams will check for colon cancer.      Talk with your health care provider about whether or not a prostate cancer screening test (PSA) is right for you.    You should be tested each year for STDs (sexually transmitted diseases), if you re at risk.     Shots: Get a flu shot each year. Get a tetanus shot every 10 years.     Nutrition:    Eat at least 5 servings of fruits and vegetables daily.     Eat whole-grain bread, whole-wheat pasta and brown rice instead of white grains and rice.     Get adequate Calcium and Vitamin D.     Lifestyle    Exercise for at least 150 minutes a week (30 minutes a day, 5 days a week). This will help you control your weight and prevent disease.     Limit alcohol to one drink per day.     No smoking.     Wear sunscreen to prevent skin cancer.     See your dentist every six months for an exam and cleaning.     See your eye doctor every 1 to 2 years.    The following are resources for the COVID vaccines:    [facility] COVID Vaccine location finder:    Https://vaccineconnector.[place].gov/    [facility] resources:    [email or web address]    [email or web address]-know-[email or web address]-the-[email or web address]-[email or web address]-vaccines

## 2021-02-25 NOTE — LETTER
March 1, 2021      Iain Fuentes  [address] [address]        Dear ,    We are writing to inform you of your test results.    Using the new American Heart Association risk calculator your 10 yr risk of global cardiovascular disease (heart attack, stroke) is 4.4%.  At any risk level > 7.5-10% we should consider the use of a statin cholesterol lowering medication.  This has been shown to help prevent heart disease and strokes when risk is at or above this level.      Your glucose or sugar level was mildly elevated.  This indicates some increased risk for having or developing diabetes.   Focus on getting regular exercise and your diet (look at the Mediterranean diet)      Resulted Orders   Prostate spec antigen screen   Result Value Ref Range    PSA 1.74 0 - 4 ug/L      Comment:      Assay Method:  Chemiluminescence using Siemens Vista analyzer   Lipid panel reflex to direct LDL Fasting   Result Value Ref Range    Cholesterol 191 <200 mg/dL    Triglycerides 61 <150 mg/dL    HDL Cholesterol 91 >39 mg/dL    LDL Cholesterol Calculated 88 <100 mg/dL      Comment:      Desirable:       <100 mg/dl    Non HDL Cholesterol 100 <130 mg/dL   Basic metabolic panel   Result Value Ref Range    Sodium 136 133 - 144 mmol/L    Potassium 4.0 3.4 - 5.3 mmol/L    Chloride 102 94 - 109 mmol/L    Carbon Dioxide 27 20 - 32 mmol/L    Anion Gap 7 3 - 14 mmol/L    Glucose 104 (H) 70 - 99 mg/dL    Urea Nitrogen 27 7 - 30 mg/dL    Creatinine 0.99 0.66 - 1.25 mg/dL    GFR Estimate 88 >60 mL/min/[1.73_m2]      Comment:      Non  GFR Calc  Starting 12/18/2018, serum creatinine based estimated GFR (eGFR) will be   calculated using the Chronic Kidney Disease Epidemiology Collaboration   (CKD-EPI) equation.      GFR Estimate If Black >90 >60 mL/min/[1.73_m2]      Comment:       GFR Calc  Starting 12/18/2018, serum creatinine based estimated GFR (eGFR) will be   calculated using  the Chronic Kidney Disease Epidemiology Collaboration   (CKD-EPI) equation.      Calcium 9.7 8.5 - 10.1 mg/dL   Hepatitis C Screen Reflex to HCV RNA Quant and Genotype   Result Value Ref Range    Hepatitis C Antibody Nonreactive NR^Nonreactive      Comment:      Assay performance characteristics have not been established for newborns,   infants, and children         If you have any questions or concerns, please call the clinic at the number listed above.       Sincerely,      Abdiel Kelly MD

## 2021-02-25 NOTE — PROGRESS NOTES
SUBJECTIVE:   CC: Iain Fuentes is an 50 year old male who presents for preventative health visit.       Patient has been advised of split billing requirements and indicates understanding: Yes  Healthy Habits:     Getting at least 3 servings of Calcium per day:  Yes    Bi-annual eye exam:  NO    Dental care twice a year:  NO    Sleep apnea or symptoms of sleep apnea:  None    Diet:  Regular (no restrictions)    Frequency of exercise:  1 day/week    Duration of exercise:  45-60 minutes    Taking medications regularly:  Yes    Medication side effects:  None    PHQ-2 Total Score: 0    Additional concerns today:  No          Today's PHQ-2 Score:   PHQ-2 ( 1999 [employer]) 2/25/2021   Q1: Little interest or pleasure in doing things 0   Q2: Feeling down, depressed or hopeless 0   PHQ-2 Score 0   Q1: Little interest or pleasure in doing things Not at all   Q2: Feeling down, depressed or hopeless Not at all   PHQ-2 Score 0       Abuse: Current or Past(Physical, Sexual or Emotional)- NO  Do you feel safe in your environment? YES    Have you ever done Advance Care Planning? (For example, a Health Directive, POLST, or a discussion with a medical provider or your loved ones about your wishes): No, advance care planning information given to patient to review.  Patient plans to discuss their wishes with loved ones or provider.      Social History     Tobacco Use     Smoking status: Current Every Day Smoker     Packs/day: 1.00     Years: 20.00     Pack years: 20.00     Types: Cigarettes     Start date: 12/15/1985     Smokeless tobacco: Never Used   Substance Use Topics     Alcohol use: Yes     Frequency: 2-3 times a week     Drinks per session: 3 or 4     Binge frequency: Weekly         Alcohol Use 2/25/2021   Prescreen: >3 drinks/day or >7 drinks/week? No   Prescreen: >3 drinks/day or >7 drinks/week? -   No flowsheet data found.    Last PSA:   PSA   Date Value Ref Range Status   02/25/2021 1.74 0 - 4 ug/L Final     Comment:      "Assay Method:  Chemiluminescence using Siemens Vista analyzer       Reviewed orders with patient. Reviewed health maintenance and updated orders accordingly - Yes  Lab work is in process  Labs reviewed in EPIC    Reviewed and updated as needed this visit by clinical staff  Tobacco  Allergies  Meds   Med Hx  Surg Hx  Fam Hx  Soc Hx        Reviewed and updated as needed this visit by Provider  Tobacco                   Review of Systems  CONSTITUTIONAL: NEGATIVE for fever, chills, change in weight  INTEGUMENTARY/SKIN: NEGATIVE for worrisome rashes, moles or lesions  EYES: NEGATIVE for vision changes or irritation  ENT: NEGATIVE for ear, mouth and throat problems  RESP: NEGATIVE for significant cough or SOB  CV: NEGATIVE for chest pain, palpitations or peripheral edema  GI: NEGATIVE for nausea, abdominal pain, heartburn, or change in bowel habits   male: negative for dysuria, hematuria, decreased urinary stream, erectile dysfunction, urethral discharge  MUSCULOSKELETAL: NEGATIVE for significant arthralgias or myalgia  NEURO: NEGATIVE for weakness, dizziness or paresthesias  ENDOCRINE: NEGATIVE for temperature intolerance, skin/hair changes  HEME/ALLERGY/IMMUNE: NEGATIVE for bleeding problems  PSYCHIATRIC: NEGATIVE for changes in mood or affect    OBJECTIVE:   /66   Pulse 93   Temp 97.3  F (36.3  C) (Temporal)   Ht 1.727 m (5' 8\")   Wt 64.5 kg (142 lb 4.8 oz)   SpO2 99%   BMI 21.64 kg/m      Physical Exam  GENERAL: alert and no distress  EYES: Eyes grossly normal to inspection, PERRL and conjunctivae and sclerae normal  HENT: ear canals and TM's normal, nose and mouth without ulcers or lesions  NECK: no adenopathy, no asymmetry, masses, or scars and thyroid normal to palpation  RESP: lungs clear to auscultation - no rales, rhonchi or wheezes  CV: regular rate and rhythm, normal S1 S2, no S3 or S4, no murmur, click or rub, no peripheral edema and peripheral pulses strong  ABDOMEN: soft, nontender, no " hepatosplenomegaly, no masses and bowel sounds normal  MS: no gross musculoskeletal defects noted, no edema  SKIN: no suspicious lesions or rashes  NEURO: Normal strength and tone, mentation intact and speech normal  PSYCH: mentation appears normal, affect normal/bright  LYMPH: no cervical, supraclavicular, axillary, or inguinal adenopathy    Labs reviewed in Epic  Results for orders placed or performed in visit on 02/25/21   Prostate spec antigen screen     Status: None   Result Value Ref Range    PSA 1.74 0 - 4 ug/L   Lipid panel reflex to direct LDL Fasting     Status: None   Result Value Ref Range    Cholesterol 191 <200 mg/dL    Triglycerides 61 <150 mg/dL    HDL Cholesterol 91 >39 mg/dL    LDL Cholesterol Calculated 88 <100 mg/dL    Non HDL Cholesterol 100 <130 mg/dL   Basic metabolic panel     Status: Abnormal   Result Value Ref Range    Sodium 136 133 - 144 mmol/L    Potassium 4.0 3.4 - 5.3 mmol/L    Chloride 102 94 - 109 mmol/L    Carbon Dioxide 27 20 - 32 mmol/L    Anion Gap 7 3 - 14 mmol/L    Glucose 104 (H) 70 - 99 mg/dL    Urea Nitrogen 27 7 - 30 mg/dL    Creatinine 0.99 0.66 - 1.25 mg/dL    GFR Estimate 88 >60 mL/min/[1.73_m2]    GFR Estimate If Black >90 >60 mL/min/[1.73_m2]    Calcium 9.7 8.5 - 10.1 mg/dL      ASSESSMENT/PLAN:   1. Routine general medical examination at a health care facility  Updated screening, immunizations, prevention.  Please see health maintenance list, care gaps   The 10-year ASCVD risk score (Simi Valleychrissie LOZADA Jr., et al., 2013) is: 4.4%    Values used to calculate the score:      Age: 50 years      Sex: Male      Is Non- : No      Diabetic: No      Tobacco smoker: Yes      Systolic Blood Pressure: 126 mmHg      Is BP treated: Yes      HDL Cholesterol: 91 mg/dL      Total Cholesterol: 191 mg/dL     2. Essential hypertension, benign  bP overall well controlled  - amLODIPine (NORVASC) 5 MG tablet; Take 1 tablet (5 mg) by mouth At Bedtime  Dispense: 90 tablet;  "Refill: 3  - lisinopril-hydrochlorothiazide (ZESTORETIC) 20-12.5 MG tablet; Take 1 tablet by mouth daily  Dispense: 90 tablet; Refill: 3    3. Uncomplicated alcohol dependence (H)  Continues to drink too much.  Has been through treatment before.  Is alcohol use at least what is admitted he is not anything compared to previous but still significant.    4. Screening for diabetes mellitus  - Basic metabolic panel    5. Lipid screening  - Lipid panel reflex to direct LDL Fasting    6. Prostate cancer screening  - Prostate spec antigen screen    7. Screen for colon cancer  - COLYASMINE(EXACT SCIENCES)    8. Need for hepatitis C screening test  - Hepatitis C Screen Reflex to HCV RNA Quant and Genotype    9. Impotence of organic origin  - tadalafil (CIALIS) 20 MG tablet; Take 0.5-1 tablets (10-20 mg) by mouth every 48 hours as needed for erectile dysfunction  Dispense: 6 tablet; Refill: 11    Patient has been advised of split billing requirements and indicates understanding: Yes  COUNSELING:   Reviewed preventive health counseling, as reflected in patient instructions       Regular exercise       Healthy diet/nutrition       Alcohol Use     Estimated body mass index is 21.64 kg/m  as calculated from the following:    Height as of this encounter: 1.727 m (5' 8\").    Weight as of this encounter: 64.5 kg (142 lb 4.8 oz).         He reports that he has been smoking cigarettes. He started smoking about 35 years ago. He has a 20.00 pack-year smoking history. He has never used smokeless tobacco.  Tobacco Cessation Action Plan:   Information offered: Patient not interested at this time      Counseling Resources:  ATP IV Guidelines  Pooled Cohorts Equation Calculator  FRAX Risk Assessment  ICSI Preventive Guidelines  Dietary Guidelines for Americans, 2010  [employer] MyPlate  ASA Prophylaxis  Lung CA Screening    Abdiel Kelly MD  [facility]  "

## 2021-03-26 LAB — COLOGUARD-ABSTRACT: NEGATIVE

## 2021-04-30 ENCOUNTER — IMMUNIZATION (OUTPATIENT)
Dept: NURSING | Facility: CLINIC | Age: 51
End: 2021-04-30
Payer: COMMERCIAL

## 2021-04-30 PROCEDURE — 0001A PR COVID VAC PFIZER DIL RECON 30 MCG/0.3 ML IM: CPT

## 2021-04-30 PROCEDURE — 91300 PR COVID VAC PFIZER DIL RECON 30 MCG/0.3 ML IM: CPT

## 2021-05-21 ENCOUNTER — IMMUNIZATION (OUTPATIENT)
Dept: NURSING | Facility: CLINIC | Age: 51
End: 2021-05-21
Attending: INTERNAL MEDICINE
Payer: COMMERCIAL

## 2021-05-21 PROCEDURE — 0002A PR COVID VAC PFIZER DIL RECON 30 MCG/0.3 ML IM: CPT

## 2021-05-21 PROCEDURE — 91300 PR COVID VAC PFIZER DIL RECON 30 MCG/0.3 ML IM: CPT

## 2021-06-21 DIAGNOSIS — I10 BENIGN ESSENTIAL HYPERTENSION: ICD-10-CM

## 2021-06-21 RX ORDER — AMLODIPINE BESYLATE 5 MG/1
TABLET ORAL
Qty: 90 TABLET | Refills: 2 | Status: SHIPPED | OUTPATIENT
Start: 2021-06-21 | End: 2022-04-12

## 2021-09-05 ENCOUNTER — HEALTH MAINTENANCE LETTER (OUTPATIENT)
Age: 51
End: 2021-09-05

## 2022-01-01 ENCOUNTER — TRANSFERRED RECORDS (OUTPATIENT)
Dept: INTERNAL MEDICINE | Facility: CLINIC | Age: 52
End: 2022-01-01

## 2022-01-01 ENCOUNTER — MYC MEDICAL ADVICE (OUTPATIENT)
Dept: INTERVENTIONAL RADIOLOGY/VASCULAR | Facility: CLINIC | Age: 52
End: 2022-01-01

## 2022-01-01 ENCOUNTER — TRANSFERRED RECORDS (OUTPATIENT)
Dept: HEALTH INFORMATION MANAGEMENT | Facility: CLINIC | Age: 52
End: 2022-01-01

## 2022-01-01 ENCOUNTER — HEALTH MAINTENANCE LETTER (OUTPATIENT)
Age: 52
End: 2022-01-01

## 2022-01-01 ENCOUNTER — TRANSCRIBE ORDERS (OUTPATIENT)
Dept: OTHER | Age: 52
End: 2022-01-01

## 2022-01-01 ENCOUNTER — APPOINTMENT (OUTPATIENT)
Dept: MEDSURG UNIT | Facility: CLINIC | Age: 52
End: 2022-01-01
Payer: COMMERCIAL

## 2022-01-01 ENCOUNTER — APPOINTMENT (OUTPATIENT)
Dept: INTERVENTIONAL RADIOLOGY/VASCULAR | Facility: CLINIC | Age: 52
End: 2022-01-01
Attending: INTERNAL MEDICINE
Payer: COMMERCIAL

## 2022-01-01 ENCOUNTER — HOSPITAL ENCOUNTER (OUTPATIENT)
Facility: CLINIC | Age: 52
Discharge: HOME | End: 2022-08-23
Attending: STUDENT IN AN ORGANIZED HEALTH CARE EDUCATION/TRAINING PROGRAM | Admitting: RADIOLOGY
Payer: COMMERCIAL

## 2022-01-01 VITALS
RESPIRATION RATE: 18 BRPM | BODY MASS INDEX: 16.67 KG/M2 | HEART RATE: 80 BPM | SYSTOLIC BLOOD PRESSURE: 120 MMHG | DIASTOLIC BLOOD PRESSURE: 78 MMHG | WEIGHT: 110 LBS | HEIGHT: 68 IN | TEMPERATURE: 97.8 F | OXYGEN SATURATION: 99 %

## 2022-01-01 DIAGNOSIS — F10.20 UNCOMPLICATED ALCOHOL DEPENDENCE (H): Primary | ICD-10-CM

## 2022-01-01 DIAGNOSIS — Z72.0 TOBACCO ABUSE: ICD-10-CM

## 2022-01-01 DIAGNOSIS — C10.9 OROPHARYNGEAL CANCER (H): Primary | ICD-10-CM

## 2022-01-01 DIAGNOSIS — J39.2 OROPHARYNGEAL MASS: Primary | ICD-10-CM

## 2022-01-01 DIAGNOSIS — J39.2 OROPHARYNGEAL MASS: ICD-10-CM

## 2022-01-01 DIAGNOSIS — R13.10 DYSPHAGIA: ICD-10-CM

## 2022-01-01 LAB
ERYTHROCYTE [DISTWIDTH] IN BLOOD BY AUTOMATED COUNT: 12.4 % (ref 10–15)
HCT VFR BLD AUTO: 36.1 % (ref 40–53)
HGB BLD-MCNC: 12.1 G/DL (ref 13.3–17.7)
INR PPP: 0.96 (ref 0.85–1.15)
MCH RBC QN AUTO: 32 PG (ref 26.5–33)
MCHC RBC AUTO-ENTMCNC: 33.5 G/DL (ref 31.5–36.5)
MCV RBC AUTO: 96 FL (ref 78–100)
PATH REPORT.ADDENDUM SPEC: ABNORMAL
PATH REPORT.COMMENTS IMP SPEC: ABNORMAL
PATH REPORT.COMMENTS IMP SPEC: YES
PATH REPORT.FINAL DX SPEC: ABNORMAL
PATH REPORT.GROSS SPEC: ABNORMAL
PATH REPORT.INTRAOP OBS SPEC DOC: ABNORMAL
PATH REPORT.MICROSCOPIC SPEC OTHER STN: ABNORMAL
PATH REPORT.RELEVANT HX SPEC: ABNORMAL
PHOTO IMAGE: ABNORMAL
PLATELET # BLD AUTO: 229 10E3/UL (ref 150–450)
RBC # BLD AUTO: 3.78 10E6/UL (ref 4.4–5.9)
WBC # BLD AUTO: 6.2 10E3/UL (ref 4–11)

## 2022-01-01 PROCEDURE — 99152 MOD SED SAME PHYS/QHP 5/>YRS: CPT

## 2022-01-01 PROCEDURE — 76937 US GUIDE VASCULAR ACCESS: CPT | Mod: 26 | Performed by: RADIOLOGY

## 2022-01-01 PROCEDURE — C1769 GUIDE WIRE: HCPCS

## 2022-01-01 PROCEDURE — 999N000132 HC STATISTIC PP CARE STAGE 1

## 2022-01-01 PROCEDURE — 999N000142 HC STATISTIC PROCEDURE PREP ONLY

## 2022-01-01 PROCEDURE — 99153 MOD SED SAME PHYS/QHP EA: CPT

## 2022-01-01 PROCEDURE — 272N000504 HC NEEDLE CR4

## 2022-01-01 PROCEDURE — 250N000011 HC RX IP 250 OP 636: Performed by: PHYSICIAN ASSISTANT

## 2022-01-01 PROCEDURE — 36561 INSERT TUNNELED CV CATH: CPT

## 2022-01-01 PROCEDURE — 85610 PROTHROMBIN TIME: CPT | Performed by: PHYSICIAN ASSISTANT

## 2022-01-01 PROCEDURE — 77001 FLUOROGUIDE FOR VEIN DEVICE: CPT | Mod: 26 | Performed by: RADIOLOGY

## 2022-01-01 PROCEDURE — 76937 US GUIDE VASCULAR ACCESS: CPT

## 2022-01-01 PROCEDURE — 272N000602 HC WOUND GLUE CR1

## 2022-01-01 PROCEDURE — 36415 COLL VENOUS BLD VENIPUNCTURE: CPT | Performed by: PHYSICIAN ASSISTANT

## 2022-01-01 PROCEDURE — 99152 MOD SED SAME PHYS/QHP 5/>YRS: CPT | Performed by: RADIOLOGY

## 2022-01-01 PROCEDURE — 36561 INSERT TUNNELED CV CATH: CPT | Performed by: RADIOLOGY

## 2022-01-01 PROCEDURE — C1788 PORT, INDWELLING, IMP: HCPCS

## 2022-01-01 PROCEDURE — 250N000009 HC RX 250: Performed by: PHYSICIAN ASSISTANT

## 2022-01-01 PROCEDURE — 77001 FLUOROGUIDE FOR VEIN DEVICE: CPT

## 2022-01-01 PROCEDURE — 85014 HEMATOCRIT: CPT | Performed by: PHYSICIAN ASSISTANT

## 2022-01-01 RX ORDER — OXYCODONE HYDROCHLORIDE 5 MG/1
5 TABLET ORAL EVERY 6 HOURS PRN
COMMUNITY
End: 2023-01-01

## 2022-01-01 RX ORDER — HEPARIN SODIUM (PORCINE) LOCK FLUSH IV SOLN 100 UNIT/ML 100 UNIT/ML
5-10 SOLUTION INTRAVENOUS
Status: DISCONTINUED | OUTPATIENT
Start: 2022-01-01 | End: 2022-01-01 | Stop reason: HOSPADM

## 2022-01-01 RX ORDER — LIDOCAINE 40 MG/G
CREAM TOPICAL
Status: DISCONTINUED | OUTPATIENT
Start: 2022-01-01 | End: 2022-01-01 | Stop reason: HOSPADM

## 2022-01-01 RX ORDER — FLUMAZENIL 0.1 MG/ML
0.2 INJECTION, SOLUTION INTRAVENOUS
Status: DISCONTINUED | OUTPATIENT
Start: 2022-01-01 | End: 2022-01-01 | Stop reason: HOSPADM

## 2022-01-01 RX ORDER — FENTANYL CITRATE 50 UG/ML
25-50 INJECTION, SOLUTION INTRAMUSCULAR; INTRAVENOUS EVERY 5 MIN PRN
Status: DISCONTINUED | OUTPATIENT
Start: 2022-01-01 | End: 2022-01-01 | Stop reason: HOSPADM

## 2022-01-01 RX ORDER — HEPARIN SODIUM (PORCINE) LOCK FLUSH IV SOLN 100 UNIT/ML 100 UNIT/ML
5 SOLUTION INTRAVENOUS
Status: COMPLETED | OUTPATIENT
Start: 2022-01-01 | End: 2022-01-01

## 2022-01-01 RX ORDER — CEFAZOLIN SODIUM 2 G/100ML
2 INJECTION, SOLUTION INTRAVENOUS
Status: COMPLETED | OUTPATIENT
Start: 2022-01-01 | End: 2022-01-01

## 2022-01-01 RX ORDER — HEPARIN SODIUM,PORCINE 10 UNIT/ML
5-10 VIAL (ML) INTRAVENOUS
Status: DISCONTINUED | OUTPATIENT
Start: 2022-01-01 | End: 2022-01-01 | Stop reason: HOSPADM

## 2022-01-01 RX ORDER — NALOXONE HYDROCHLORIDE 0.4 MG/ML
0.4 INJECTION, SOLUTION INTRAMUSCULAR; INTRAVENOUS; SUBCUTANEOUS
Status: DISCONTINUED | OUTPATIENT
Start: 2022-01-01 | End: 2022-01-01 | Stop reason: HOSPADM

## 2022-01-01 RX ORDER — HEPARIN SODIUM,PORCINE 10 UNIT/ML
5-10 VIAL (ML) INTRAVENOUS EVERY 24 HOURS
Status: DISCONTINUED | OUTPATIENT
Start: 2022-01-01 | End: 2022-01-01 | Stop reason: HOSPADM

## 2022-01-01 RX ORDER — NALOXONE HYDROCHLORIDE 0.4 MG/ML
0.2 INJECTION, SOLUTION INTRAMUSCULAR; INTRAVENOUS; SUBCUTANEOUS
Status: DISCONTINUED | OUTPATIENT
Start: 2022-01-01 | End: 2022-01-01 | Stop reason: HOSPADM

## 2022-01-01 RX ORDER — SODIUM CHLORIDE 9 MG/ML
INJECTION, SOLUTION INTRAVENOUS CONTINUOUS
Status: DISCONTINUED | OUTPATIENT
Start: 2022-01-01 | End: 2022-01-01 | Stop reason: HOSPADM

## 2022-01-01 RX ADMIN — MIDAZOLAM HYDROCHLORIDE 1 MG: 1 INJECTION, SOLUTION INTRAMUSCULAR; INTRAVENOUS at 13:48

## 2022-01-01 RX ADMIN — MIDAZOLAM HYDROCHLORIDE 1 MG: 1 INJECTION, SOLUTION INTRAMUSCULAR; INTRAVENOUS at 13:36

## 2022-01-01 RX ADMIN — FENTANYL CITRATE 50 MCG: 50 INJECTION, SOLUTION INTRAMUSCULAR; INTRAVENOUS at 13:48

## 2022-01-01 RX ADMIN — LIDOCAINE HYDROCHLORIDE 10 ML: 10 INJECTION, SOLUTION EPIDURAL; INFILTRATION; INTRACAUDAL; PERINEURAL at 13:39

## 2022-01-01 RX ADMIN — CEFAZOLIN SODIUM 2 G: 2 INJECTION, SOLUTION INTRAVENOUS at 12:58

## 2022-01-01 RX ADMIN — FENTANYL CITRATE 50 MCG: 50 INJECTION, SOLUTION INTRAMUSCULAR; INTRAVENOUS at 13:36

## 2022-01-01 RX ADMIN — Medication 5 ML: at 13:54

## 2022-01-01 ASSESSMENT — ACTIVITIES OF DAILY LIVING (ADL)
ADLS_ACUITY_SCORE: 35
ADLS_ACUITY_SCORE: 35

## 2022-03-05 DIAGNOSIS — I10 BENIGN ESSENTIAL HYPERTENSION: ICD-10-CM

## 2022-03-05 NOTE — LETTER
[facility]  [address] [address]  [phone]  March 8, 2022  Iain Villareal Fuentes  [address] [address]    Dear Will,    I am contacting you regarding the refill request we received for you. After reviewing your chart it looks like you are overdue for your annual and for a med check. Please call [phone] or schedule this through my chart to continue to receive refills. If you anticipate running out before your appointment let us know and we can send in a tena refill.       Thank you,     [facility] nursing staff

## 2022-03-07 RX ORDER — LISINOPRIL AND HYDROCHLOROTHIAZIDE 12.5; 2 MG/1; MG/1
1 TABLET ORAL DAILY
Qty: 90 TABLET | Refills: 0 | Status: SHIPPED | OUTPATIENT
Start: 2022-03-07 | End: 2022-07-05

## 2022-03-07 NOTE — TELEPHONE ENCOUNTER
Routing refill request to provider for review/approval because:  Diuretics (Including Combos) Protocol Failed 03/05/2022 10:53 AM   Protocol Details  Blood pressure under 140/90 in past 12 months    Recent (12 mo) or future (30 days) visit within the authorizing provider's specialty    Normal serum creatinine on file in past 12 months    Normal serum potassium on file in past 12 months    Normal serum sodium on file in past 12 months     Nudge message sent to pt - due for appt.

## 2022-04-09 DIAGNOSIS — I10 BENIGN ESSENTIAL HYPERTENSION: ICD-10-CM

## 2022-04-12 RX ORDER — AMLODIPINE BESYLATE 5 MG/1
5 TABLET ORAL DAILY
Qty: 30 TABLET | Refills: 0 | Status: SHIPPED | OUTPATIENT
Start: 2022-04-12 | End: 2022-05-12

## 2022-04-12 NOTE — TELEPHONE ENCOUNTER
Routing refill request to provider for review/approval because:  Patient needs to be seen because it has been more than 1 year since last office visit.      LM on VM appt is needed, and Letter mailed to home address. And 2 [employer] messages sent - no response from pt.

## 2022-04-17 ENCOUNTER — HEALTH MAINTENANCE LETTER (OUTPATIENT)
Age: 52
End: 2022-04-17

## 2022-05-09 DIAGNOSIS — I10 BENIGN ESSENTIAL HYPERTENSION: ICD-10-CM

## 2022-05-11 NOTE — TELEPHONE ENCOUNTER
Patient Contact    Attempt # 1    Was call answered?  No.  Left message on voicemail with information to call me back.    Upon call back, please assist patient in getting annual wellness visit scheduled. Last OV 2/25/21.     Isabela Schmid RN

## 2022-05-12 RX ORDER — AMLODIPINE BESYLATE 5 MG/1
TABLET ORAL
Qty: 30 TABLET | Refills: 0 | Status: SHIPPED | OUTPATIENT
Start: 2022-05-12 | End: 2022-07-01

## 2022-05-12 NOTE — TELEPHONE ENCOUNTER
Attempt 2 made not call back routing to Dr. Kelly   Given 1 tena did not follow up   Blood pressure under 140/90 in past 12 months    Recent (12 mo) or future (30 days) visit within the authorizing provider's specialty    Normal serum creatinine on file in past 12 months

## 2022-05-13 DIAGNOSIS — I10 BENIGN ESSENTIAL HYPERTENSION: ICD-10-CM

## 2022-05-16 ENCOUNTER — DOCUMENTATION ONLY (OUTPATIENT)
Dept: LAB | Facility: CLINIC | Age: 52
End: 2022-05-16
Payer: COMMERCIAL

## 2022-05-16 DIAGNOSIS — N17.9 ACUTE RENAL FAILURE, UNSPECIFIED ACUTE RENAL FAILURE TYPE (H): Primary | ICD-10-CM

## 2022-05-16 NOTE — PROGRESS NOTES
Please place or confirm orders for upcoming lab appointment on 05/16/2022. Thank you.      If patient doesn't need labs please have MA call and inform patient and cancel lab appt. Thank you.

## 2022-05-17 NOTE — TELEPHONE ENCOUNTER
Patient Contact    Attempt # 1    Was call answered?  No.  Left message on voicemail with information to call me back.    Upon call back, please assist patient in scheduling annual wellness visit. Last OV 2/25/2021.    Isabela Schmid RN

## 2022-05-18 NOTE — TELEPHONE ENCOUNTER
Patient Contact    Attempt # 2    Was call answered?  No.  Left message on voicemail with information to call me back.

## 2022-05-23 DIAGNOSIS — I10 BENIGN ESSENTIAL HYPERTENSION: ICD-10-CM

## 2022-05-25 RX ORDER — AMLODIPINE BESYLATE 5 MG/1
TABLET ORAL
Qty: 90 TABLET | OUTPATIENT
Start: 2022-05-25

## 2022-05-25 NOTE — TELEPHONE ENCOUNTER
Routing refill request to provider for review/request because:    Patient has multiple messages in chart that staff has tried to reach out. Patient not active in MyChart    Recent ED visits    Patient needs to be seen because it has been more than 1 year since last office visit.    Also routing to TC.     Please reach out and schedule patient.    Steven Brock RN  [facility]

## 2022-05-25 NOTE — TELEPHONE ENCOUNTER
Routing refill request to provider for review/approval because:  BP Readings from Last 3 Encounters:   02/25/21 126/66   07/03/20 122/75   05/27/20 138/88     Creatinine   Date Value Ref Range Status   02/25/2021 0.99 0.66 - 1.25 mg/dL Final     3 attempts made to reach out to the patient and schedule patient for an appt. Last OV 2/2021.     Isabela Schmid RN

## 2022-05-25 NOTE — TELEPHONE ENCOUNTER
Patient Contact    Attempt # 3    Was call answered?  No.  Left message on voicemail with information to call me back.    3 attempts made to assist patient in scheduling annual wellness visit. Routing refill request to PCP for approval or denial.    Isabela Schmid RN

## 2022-06-27 ENCOUNTER — ALLIED HEALTH/NURSE VISIT (OUTPATIENT)
Dept: INTERNAL MEDICINE | Facility: CLINIC | Age: 52
End: 2022-06-27
Payer: COMMERCIAL

## 2022-06-27 VITALS — SYSTOLIC BLOOD PRESSURE: 138 MMHG | DIASTOLIC BLOOD PRESSURE: 70 MMHG

## 2022-06-27 DIAGNOSIS — Z01.30 BP CHECK: Primary | ICD-10-CM

## 2022-06-27 PROCEDURE — 99207 PR NO CHARGE NURSE ONLY: CPT | Performed by: INTERNAL MEDICINE

## 2022-06-27 NOTE — PROGRESS NOTES
Iain Fuentes was evaluated at [facility] on June 27, 2022 at which time his blood pressure was:    BP Readings from Last 3 Encounters:   06/27/22 138/70   02/25/21 126/66   07/03/20 122/75     Pulse Readings from Last 3 Encounters:   02/25/21 93   07/03/20 68   04/30/19 62       Reviewed lifestyle modifications for blood pressure control and reduction: including making healthy food choices, managing weight, getting regular exercise, smoking cessation, reducing alcohol consumption, monitoring blood pressure regularly.     Symptoms: None    BP Goal:< 140/90 mmHg    BP Assessment:  BP at goal    Potential Reasons for BP too high: NA - Not applicable    BP Follow-Up Plan: Recheck BP in 6 months at pharmacy    Recommendation to Provider: BP checked at pharmacy and noted to be at goal of <140/90.   Recommended patient follow-up in 6 months at the pharmacy.    Note completed by:   Dori Zafar, PharmD  Retail Pharmacist  For [facility]

## 2022-07-01 DIAGNOSIS — I10 BENIGN ESSENTIAL HYPERTENSION: ICD-10-CM

## 2022-07-05 DIAGNOSIS — I10 BENIGN ESSENTIAL HYPERTENSION: ICD-10-CM

## 2022-07-05 RX ORDER — AMLODIPINE BESYLATE 5 MG/1
TABLET ORAL
Qty: 60 TABLET | Refills: 0 | Status: SHIPPED | OUTPATIENT
Start: 2022-07-05 | End: 2022-07-07

## 2022-07-05 RX ORDER — LISINOPRIL AND HYDROCHLOROTHIAZIDE 12.5; 2 MG/1; MG/1
1 TABLET ORAL DAILY
Qty: 60 TABLET | Refills: 0 | Status: SHIPPED | OUTPATIENT
Start: 2022-07-05 | End: 2022-07-07

## 2022-07-05 NOTE — TELEPHONE ENCOUNTER
Routing refill request to provider for review/approval because:  Labs not current:  Creatinine, potassium, Sodium  Patient needs to be seen because it has been more than 1 year since last office visit.    Patient is scheduled for an appt 8/18/22. Patient needing tena refill    Isabela Schmid RN

## 2022-07-07 RX ORDER — AMLODIPINE BESYLATE 5 MG/1
TABLET ORAL
Qty: 90 TABLET | Refills: 0 | Status: SHIPPED | OUTPATIENT
Start: 2022-07-07 | End: 2023-01-01

## 2022-07-07 RX ORDER — LISINOPRIL AND HYDROCHLOROTHIAZIDE 12.5; 2 MG/1; MG/1
1 TABLET ORAL DAILY
Qty: 90 TABLET | Refills: 0 | Status: SHIPPED | OUTPATIENT
Start: 2022-07-07 | End: 2023-01-01

## 2022-07-07 NOTE — TELEPHONE ENCOUNTER
Routing refill request to provider for review/approval because:  Labs not current:   Creatinine   Date Value Ref Range Status   02/25/2021 0.99 0.66 - 1.25 mg/dL Final     Potassium   Date Value Ref Range Status   02/25/2021 4.0 3.4 - 5.3 mmol/L Final     Sodium   Date Value Ref Range Status   02/25/2021 136 133 - 144 mmol/L Final       Called and spoke with pharmacy and stated PCP just sent this over 2 days ago for 60 tabs. Per pharamcy ([facility]) will only fill for 60 tabs. Routing to PCP to review and advise.    review order pended.

## 2022-07-11 ENCOUNTER — OFFICE VISIT (OUTPATIENT)
Dept: URGENT CARE | Facility: URGENT CARE | Age: 52
End: 2022-07-11
Payer: COMMERCIAL

## 2022-07-11 VITALS
HEART RATE: 87 BPM | TEMPERATURE: 98.5 F | SYSTOLIC BLOOD PRESSURE: 148 MMHG | DIASTOLIC BLOOD PRESSURE: 94 MMHG | WEIGHT: 121 LBS | BODY MASS INDEX: 18.4 KG/M2 | OXYGEN SATURATION: 100 %

## 2022-07-11 DIAGNOSIS — J02.9 EXUDATIVE PHARYNGITIS: ICD-10-CM

## 2022-07-11 DIAGNOSIS — Z72.0 TOBACCO ABUSE: Primary | ICD-10-CM

## 2022-07-11 DIAGNOSIS — K13.29 HYPERKERATOTIC ORAL LESION: ICD-10-CM

## 2022-07-11 PROCEDURE — 99214 OFFICE O/P EST MOD 30 MIN: CPT | Performed by: PHYSICIAN ASSISTANT

## 2022-07-11 RX ORDER — AZITHROMYCIN 250 MG/1
TABLET, FILM COATED ORAL
Qty: 6 TABLET | Refills: 0 | Status: SHIPPED | OUTPATIENT
Start: 2022-07-11 | End: 2022-07-16

## 2022-07-11 NOTE — PROGRESS NOTES
Assessment & Plan     Tobacco abuse    Patient is a smoker and drinks alchol  Concern for mouth lesions beng cancer  Referral to ENT, nurse to call to get him seen soon  - Adult ENT  Referral; Future    Exudative pharyngitis    There is exudate on posterior oropharnyx  He was treated for yeast and with augmentin from 2 different  locations and neither have helped  Concern for some type of atypical infection including diptheria  Trial course of zithromax    - azithromycin (ZITHROMAX) 250 MG tablet; Take 2 tablets (500 mg) by mouth daily for 1 day, THEN 1 tablet (250 mg) daily for 4 days.  - Adult ENT  Referral; Future    Hyperkeratotic oral lesion    Nurse to make appt for ENT to have oral lesions evaluated    - magic mouthwash (ENTER INGREDIENTS IN COMMENTS) suspension; Swish and spit 5-10 mLs in mouth every 6 hours as needed 30 ml of Benadryl (12.5 mg/5 ml), 60 ml Maalox, 30 ml Viscous Lidocaine  - Adult ENT  Referral; Future    Review of external notes as documented elsewhere in note         Tobacco Cessation:   reports that he has been smoking cigarettes. He started smoking about 36 years ago. He has a 20.00 pack-year smoking history. He has never used smokeless tobacco.  CONSULTATION/REFERRAL to ENT    No follow-ups on file.    Gucci Fargoso, [facility], PA-C  [facility] JERAD Feldman is a 51 year old, presenting for the following health issues:  Mouth Problem (Seen in UC week, thinks there is a piece of food stuck in a tooth)      HPI     Iain Fuentes, 51 year old, male presents to the urgent care today with:   Mouth Problem (Seen in  week, thinks there is a piece of food stuck in a tooth)      Review of Systems   Constitutional, HEENT, cardiovascular, pulmonary, gi and gu systems are negative, except as otherwise noted.      Objective    BP (!) 148/94 (BP Location: Right arm, Patient Position: Sitting, Cuff Size: Adult Regular)   Pulse 87    Temp 98.5  F (36.9  C) (Oral)   Wt 54.9 kg (121 lb)   SpO2 100%   BMI 18.40 kg/m    Body mass index is 18.4 kg/m .  Physical Exam   GENERAL: healthy, alert and no distress  EYES: Eyes grossly normal to inspection, PERRL and conjunctivae and sclerae normal  HENT: Positive for mouth lesions left lower mouth.  Positive for exudate in posterior oropharynx. Positive for erythematous mouth  NECK: no adenopathy, no asymmetry, masses, or scars and thyroid normal to palpation  RESP: lungs clear to auscultation - no rales, rhonchi or wheezes  CV: regular rate and rhythm, normal S1 S2, no S3 or S4, no murmur, click or rub, no peripheral edema and peripheral pulses strong  MS: no gross musculoskeletal defects noted, no edema  SKIN: no suspicious lesions or rashes  NEURO: Normal strength and tone, mentation intact and speech normal  PSYCH: mentation appears normal, affect normal/bright                    .  ..

## 2022-07-13 ENCOUNTER — TELEPHONE (OUTPATIENT)
Dept: OTOLARYNGOLOGY | Facility: CLINIC | Age: 52
End: 2022-07-13

## 2022-07-13 ENCOUNTER — APPOINTMENT (OUTPATIENT)
Dept: CT IMAGING | Facility: HOSPITAL | Age: 52
End: 2022-07-13
Attending: EMERGENCY MEDICINE
Payer: COMMERCIAL

## 2022-07-13 LAB
ANION GAP SERPL CALCULATED.3IONS-SCNC: 16 MMOL/L (ref 5–18)
BASOPHILS # BLD AUTO: 0.1 10E3/UL (ref 0–0.2)
BASOPHILS NFR BLD AUTO: 1 %
BUN SERPL-MCNC: 38 MG/DL (ref 8–22)
CALCIUM SERPL-MCNC: 10.2 MG/DL (ref 8.5–10.5)
CHLORIDE BLD-SCNC: 95 MMOL/L (ref 98–107)
CO2 SERPL-SCNC: 25 MMOL/L (ref 22–31)
CREAT SERPL-MCNC: 2.43 MG/DL (ref 0.7–1.3)
EOSINOPHIL # BLD AUTO: 0.1 10E3/UL (ref 0–0.7)
EOSINOPHIL NFR BLD AUTO: 1 %
ERYTHROCYTE [DISTWIDTH] IN BLOOD BY AUTOMATED COUNT: 12.2 % (ref 10–15)
GFR SERPL CREATININE-BSD FRML MDRD: 31 ML/MIN/1.73M2
GLUCOSE BLD-MCNC: 65 MG/DL (ref 70–125)
HCT VFR BLD AUTO: 40.5 % (ref 40–53)
HGB BLD-MCNC: 13.8 G/DL (ref 13.3–17.7)
IMM GRANULOCYTES # BLD: 0 10E3/UL
IMM GRANULOCYTES NFR BLD: 0 %
LYMPHOCYTES # BLD AUTO: 2.4 10E3/UL (ref 0.8–5.3)
LYMPHOCYTES NFR BLD AUTO: 26 %
MCH RBC QN AUTO: 32.5 PG (ref 26.5–33)
MCHC RBC AUTO-ENTMCNC: 34.1 G/DL (ref 31.5–36.5)
MCV RBC AUTO: 95 FL (ref 78–100)
MONOCYTES # BLD AUTO: 1 10E3/UL (ref 0–1.3)
MONOCYTES NFR BLD AUTO: 11 %
NEUTROPHILS # BLD AUTO: 5.6 10E3/UL (ref 1.6–8.3)
NEUTROPHILS NFR BLD AUTO: 61 %
NRBC # BLD AUTO: 0 10E3/UL
NRBC BLD AUTO-RTO: 0 /100
PLATELET # BLD AUTO: 414 10E3/UL (ref 150–450)
POTASSIUM BLD-SCNC: 4.4 MMOL/L (ref 3.5–5)
RBC # BLD AUTO: 4.25 10E6/UL (ref 4.4–5.9)
SODIUM SERPL-SCNC: 136 MMOL/L (ref 136–145)
WBC # BLD AUTO: 9.2 10E3/UL (ref 4–11)

## 2022-07-13 PROCEDURE — 36415 COLL VENOUS BLD VENIPUNCTURE: CPT | Performed by: EMERGENCY MEDICINE

## 2022-07-13 PROCEDURE — 85025 COMPLETE CBC W/AUTO DIFF WBC: CPT | Performed by: EMERGENCY MEDICINE

## 2022-07-13 PROCEDURE — 70491 CT SOFT TISSUE NECK W/DYE: CPT

## 2022-07-13 PROCEDURE — 250N000011 HC RX IP 250 OP 636: Performed by: EMERGENCY MEDICINE

## 2022-07-13 PROCEDURE — 99285 EMERGENCY DEPT VISIT HI MDM: CPT | Mod: 25

## 2022-07-13 PROCEDURE — 80048 BASIC METABOLIC PNL TOTAL CA: CPT | Performed by: EMERGENCY MEDICINE

## 2022-07-13 RX ORDER — CLINDAMYCIN PHOSPHATE 600 MG/50ML
600 INJECTION, SOLUTION INTRAVENOUS ONCE
Status: COMPLETED | OUTPATIENT
Start: 2022-07-14 | End: 2022-07-14

## 2022-07-13 RX ORDER — IOPAMIDOL 755 MG/ML
75 INJECTION, SOLUTION INTRAVASCULAR ONCE
Status: COMPLETED | OUTPATIENT
Start: 2022-07-13 | End: 2022-07-13

## 2022-07-13 RX ORDER — SODIUM CHLORIDE 9 MG/ML
1000 INJECTION, SOLUTION INTRAVENOUS CONTINUOUS
Status: DISCONTINUED | OUTPATIENT
Start: 2022-07-14 | End: 2022-07-14 | Stop reason: HOSPADM

## 2022-07-13 RX ADMIN — IOPAMIDOL 75 ML: 755 INJECTION, SOLUTION INTRAVENOUS at 19:11

## 2022-07-13 NOTE — TELEPHONE ENCOUNTER
Patient was scheduled with [facility] today and appt is bumped.  He said he can;t wait til the next available appt.  He can't eat or drink.    Please call and advise if patient can be worked in sooner.    Thanks!

## 2022-07-13 NOTE — ED TRIAGE NOTES
Patient presents here for evaluation of esophageal pain overr the past two weeks. He reports that he has been seen at an urgent care center several times. He reports that he has a growth in his oropharynx that is causing discomfort.

## 2022-07-13 NOTE — ED NOTES
Expected Patient Referral to ED  3:30 PM    Referring Clinic/Provider:  ENT clinic     Reason for referral/Clinical facts:  Mouth lesions, not able to eat or drink.  PMHx chewing tobacco    Recommendations provided:  Send to ED for further evaluation    Caller was informed that this institution does possess the capabilities and/or resources to provide for patient and should be transferred to our facility.    Discussed that if direct admit is sought and any hurdles are encountered, this ED would be happy to see the patient and evaluate.    Informed caller that recommendations provided are recommendations based only on the facts provided and that they responsible to accept or reject the advice, or to seek a formal in person consultation as needed and that this ED will see/treat patient should they arrive.      Luther Odom MD  [facility] EMERGENCY DEPARTMENT  [facility] [facility] [address] [phone]       Luther Odom MD  07/13/22 [id number]

## 2022-07-13 NOTE — ED NOTES
ED Provider In Triage Note  [facility]  Encounter Date: Jul 13, 2022    Chief Complaint   Patient presents with     Pharyngitis       Brief HPI:   Iain Fuentes is a 51 year old male referred to the emergency department by ENT for intraoral sublingual mass.  Patient denies pain and decreased oral intake    Brief Physical Exam:  /70   Pulse 98   Temp 98.6  F (37  C) (Oral)   Resp 16   Wt 54.9 kg (121 lb)   SpO2 96%   BMI 18.40 kg/m    General: Non-toxic appearing  HEENT: Okay  Resp: No respiratory distress  Abdomen: Non-peritoneal  Neuro: Alert, oriented, answers questions appropriately  Psych: Behavior appropriate      Plan Initiated in Triage:  Orders Placed This Encounter     Soft tissue neck CT w contrast     Basic metabolic panel       PIT Dispo:   Return to [facility] while awaiting workup and ED bed availability    Yousif Ruggiero MD on 7/13/2022 at 4:11 PM    Patient was evaluated by the Physician in Triage due to a limitation of available rooms in the Emergency Department. A plan of care was discussed based on the information obtained on the initial evaluation and patient was consuled to return back to the Emergency Department lob after this initial evalutaiton until results were obtained or a room became available in the Emergency Department. Patient was counseled not to leave prior to receiving the results of their workup.     Yousif Ruggiero MD  [facility] EMERGENCY DEPARTMENT  [address] [place] [id number]  [phone]     Yousif Ruggiero MD  07/13/22 [id number]

## 2022-07-14 ENCOUNTER — HOSPITAL ENCOUNTER (EMERGENCY)
Facility: HOSPITAL | Age: 52
Discharge: ANOTHER HOSPITAL | End: 2022-07-14
Attending: EMERGENCY MEDICINE | Admitting: EMERGENCY MEDICINE
Payer: COMMERCIAL

## 2022-07-14 ENCOUNTER — APPOINTMENT (OUTPATIENT)
Dept: SPEECH THERAPY | Facility: CLINIC | Age: 52
DRG: 146 | End: 2022-07-14
Attending: INTERNAL MEDICINE
Payer: COMMERCIAL

## 2022-07-14 ENCOUNTER — HOSPITAL ENCOUNTER (INPATIENT)
Facility: CLINIC | Age: 52
LOS: 2 days | Discharge: HOME | DRG: 146 | End: 2022-07-16
Attending: INTERNAL MEDICINE | Admitting: INTERNAL MEDICINE
Payer: COMMERCIAL

## 2022-07-14 VITALS
WEIGHT: 121 LBS | SYSTOLIC BLOOD PRESSURE: 121 MMHG | BODY MASS INDEX: 18.4 KG/M2 | OXYGEN SATURATION: 97 % | DIASTOLIC BLOOD PRESSURE: 70 MMHG | RESPIRATION RATE: 18 BRPM | HEART RATE: 84 BPM | TEMPERATURE: 98.6 F

## 2022-07-14 DIAGNOSIS — J02.9 PHARYNGITIS, UNSPECIFIED ETIOLOGY: ICD-10-CM

## 2022-07-14 DIAGNOSIS — N17.9 ACUTE KIDNEY INJURY (H): ICD-10-CM

## 2022-07-14 PROBLEM — J39.2 OROPHARYNGEAL MASS: Status: ACTIVE | Noted: 2022-07-14

## 2022-07-14 LAB
ANION GAP SERPL CALCULATED.3IONS-SCNC: 7 MMOL/L (ref 3–14)
BUN SERPL-MCNC: 32 MG/DL (ref 7–30)
CALCIUM SERPL-MCNC: 9 MG/DL (ref 8.5–10.1)
CHLORIDE BLD-SCNC: 101 MMOL/L (ref 94–109)
CO2 SERPL-SCNC: 27 MMOL/L (ref 20–32)
CREAT SERPL-MCNC: 1.51 MG/DL (ref 0.66–1.25)
DEPRECATED S PYO AG THROAT QL EIA: NEGATIVE
ERYTHROCYTE [DISTWIDTH] IN BLOOD BY AUTOMATED COUNT: 12.1 % (ref 10–15)
GFR SERPL CREATININE-BSD FRML MDRD: 56 ML/MIN/1.73M2
GLUCOSE BLD-MCNC: 122 MG/DL (ref 70–99)
GROUP A STREP BY PCR: NOT DETECTED
HCT VFR BLD AUTO: 35.5 % (ref 40–53)
HGB BLD-MCNC: 11.7 G/DL (ref 13.3–17.7)
HIV 1+2 AB+HIV1 P24 AG SERPL QL IA: NONREACTIVE
MCH RBC QN AUTO: 32.2 PG (ref 26.5–33)
MCHC RBC AUTO-ENTMCNC: 33 G/DL (ref 31.5–36.5)
MCV RBC AUTO: 98 FL (ref 78–100)
PLATELET # BLD AUTO: 348 10E3/UL (ref 150–450)
POTASSIUM BLD-SCNC: 3.6 MMOL/L (ref 3.4–5.3)
PROCALCITONIN SERPL-MCNC: <0.05 NG/ML
RBC # BLD AUTO: 3.63 10E6/UL (ref 4.4–5.9)
SARS-COV-2 RNA RESP QL NAA+PROBE: NEGATIVE
SODIUM SERPL-SCNC: 135 MMOL/L (ref 133–144)
WBC # BLD AUTO: 6.1 10E3/UL (ref 4–11)

## 2022-07-14 PROCEDURE — 87651 STREP A DNA AMP PROBE: CPT | Performed by: INTERNAL MEDICINE

## 2022-07-14 PROCEDURE — 84145 PROCALCITONIN (PCT): CPT | Performed by: INTERNAL MEDICINE

## 2022-07-14 PROCEDURE — 250N000011 HC RX IP 250 OP 636: Performed by: INTERNAL MEDICINE

## 2022-07-14 PROCEDURE — 96365 THER/PROPH/DIAG IV INF INIT: CPT | Mod: 59

## 2022-07-14 PROCEDURE — 258N000003 HC RX IP 258 OP 636: Performed by: EMERGENCY MEDICINE

## 2022-07-14 PROCEDURE — C9803 HOPD COVID-19 SPEC COLLECT: HCPCS

## 2022-07-14 PROCEDURE — 258N000003 HC RX IP 258 OP 636: Performed by: INTERNAL MEDICINE

## 2022-07-14 PROCEDURE — 96361 HYDRATE IV INFUSION ADD-ON: CPT

## 2022-07-14 PROCEDURE — 99223 1ST HOSP IP/OBS HIGH 75: CPT | Mod: AI | Performed by: INTERNAL MEDICINE

## 2022-07-14 PROCEDURE — 87389 HIV-1 AG W/HIV-1&-2 AB AG IA: CPT | Performed by: INTERNAL MEDICINE

## 2022-07-14 PROCEDURE — 85014 HEMATOCRIT: CPT | Performed by: INTERNAL MEDICINE

## 2022-07-14 PROCEDURE — 250N000011 HC RX IP 250 OP 636: Performed by: EMERGENCY MEDICINE

## 2022-07-14 PROCEDURE — 120N000001 HC R&B MED SURG/OB

## 2022-07-14 PROCEDURE — 250N000013 HC RX MED GY IP 250 OP 250 PS 637: Performed by: INTERNAL MEDICINE

## 2022-07-14 PROCEDURE — 80048 BASIC METABOLIC PNL TOTAL CA: CPT | Performed by: INTERNAL MEDICINE

## 2022-07-14 PROCEDURE — 92610 EVALUATE SWALLOWING FUNCTION: CPT | Mod: GN

## 2022-07-14 PROCEDURE — 87635 SARS-COV-2 COVID-19 AMP PRB: CPT | Performed by: EMERGENCY MEDICINE

## 2022-07-14 PROCEDURE — 36415 COLL VENOUS BLD VENIPUNCTURE: CPT | Performed by: INTERNAL MEDICINE

## 2022-07-14 RX ORDER — NALOXONE HYDROCHLORIDE 0.4 MG/ML
0.2 INJECTION, SOLUTION INTRAMUSCULAR; INTRAVENOUS; SUBCUTANEOUS
Status: DISCONTINUED | OUTPATIENT
Start: 2022-07-14 | End: 2022-07-16 | Stop reason: HOSPADM

## 2022-07-14 RX ORDER — NALOXONE HYDROCHLORIDE 0.4 MG/ML
0.4 INJECTION, SOLUTION INTRAMUSCULAR; INTRAVENOUS; SUBCUTANEOUS
Status: DISCONTINUED | OUTPATIENT
Start: 2022-07-14 | End: 2022-07-16 | Stop reason: HOSPADM

## 2022-07-14 RX ORDER — MULTIPLE VITAMINS W/ MINERALS TAB 9MG-400MCG
1 TAB ORAL DAILY
Status: DISCONTINUED | OUTPATIENT
Start: 2022-07-14 | End: 2022-07-14

## 2022-07-14 RX ORDER — LIDOCAINE 40 MG/G
CREAM TOPICAL
Status: DISCONTINUED | OUTPATIENT
Start: 2022-07-14 | End: 2022-07-15

## 2022-07-14 RX ORDER — AMOXICILLIN 250 MG
1 CAPSULE ORAL 2 TIMES DAILY PRN
Status: DISCONTINUED | OUTPATIENT
Start: 2022-07-14 | End: 2022-07-16 | Stop reason: HOSPADM

## 2022-07-14 RX ORDER — PANTOPRAZOLE SODIUM 40 MG/1
40 TABLET, DELAYED RELEASE ORAL
Status: DISCONTINUED | OUTPATIENT
Start: 2022-07-14 | End: 2022-07-16 | Stop reason: HOSPADM

## 2022-07-14 RX ORDER — ONDANSETRON 2 MG/ML
4 INJECTION INTRAMUSCULAR; INTRAVENOUS EVERY 6 HOURS PRN
Status: DISCONTINUED | OUTPATIENT
Start: 2022-07-14 | End: 2022-07-15

## 2022-07-14 RX ORDER — GUAIFENESIN 600 MG/1
15 TABLET, EXTENDED RELEASE ORAL DAILY
Status: DISCONTINUED | OUTPATIENT
Start: 2022-07-14 | End: 2022-07-16 | Stop reason: HOSPADM

## 2022-07-14 RX ORDER — PROCHLORPERAZINE 25 MG
25 SUPPOSITORY, RECTAL RECTAL EVERY 12 HOURS PRN
Status: DISCONTINUED | OUTPATIENT
Start: 2022-07-14 | End: 2022-07-16 | Stop reason: HOSPADM

## 2022-07-14 RX ORDER — NYSTATIN 100000/ML
500000 SUSPENSION, ORAL (FINAL DOSE FORM) ORAL 4 TIMES DAILY
Status: DISCONTINUED | OUTPATIENT
Start: 2022-07-14 | End: 2022-07-16 | Stop reason: HOSPADM

## 2022-07-14 RX ORDER — ONDANSETRON 4 MG/1
4 TABLET, ORALLY DISINTEGRATING ORAL EVERY 6 HOURS PRN
Status: DISCONTINUED | OUTPATIENT
Start: 2022-07-14 | End: 2022-07-15

## 2022-07-14 RX ORDER — AMOXICILLIN 250 MG
2 CAPSULE ORAL 2 TIMES DAILY PRN
Status: DISCONTINUED | OUTPATIENT
Start: 2022-07-14 | End: 2022-07-16 | Stop reason: HOSPADM

## 2022-07-14 RX ORDER — OXYCODONE HYDROCHLORIDE 5 MG/1
5 TABLET ORAL EVERY 4 HOURS PRN
Status: DISCONTINUED | OUTPATIENT
Start: 2022-07-14 | End: 2022-07-15

## 2022-07-14 RX ORDER — ACETAMINOPHEN 650 MG/1
650 SUPPOSITORY RECTAL EVERY 6 HOURS PRN
Status: DISCONTINUED | OUTPATIENT
Start: 2022-07-14 | End: 2022-07-16 | Stop reason: HOSPADM

## 2022-07-14 RX ORDER — CLINDAMYCIN PHOSPHATE 600 MG/50ML
600 INJECTION, SOLUTION INTRAVENOUS EVERY 8 HOURS
Status: DISCONTINUED | OUTPATIENT
Start: 2022-07-14 | End: 2022-07-16 | Stop reason: HOSPADM

## 2022-07-14 RX ORDER — NICOTINE POLACRILEX 4 MG
15-30 LOZENGE BUCCAL
Status: DISCONTINUED | OUTPATIENT
Start: 2022-07-14 | End: 2022-07-16 | Stop reason: HOSPADM

## 2022-07-14 RX ORDER — NICOTINE 21 MG/24HR
1 PATCH, TRANSDERMAL 24 HOURS TRANSDERMAL DAILY
Status: DISCONTINUED | OUTPATIENT
Start: 2022-07-14 | End: 2022-07-16 | Stop reason: HOSPADM

## 2022-07-14 RX ORDER — ACETAMINOPHEN 325 MG/1
650 TABLET ORAL EVERY 6 HOURS PRN
Status: DISCONTINUED | OUTPATIENT
Start: 2022-07-14 | End: 2022-07-15

## 2022-07-14 RX ORDER — HYDROMORPHONE HYDROCHLORIDE 1 MG/ML
.3-.5 INJECTION, SOLUTION INTRAMUSCULAR; INTRAVENOUS; SUBCUTANEOUS
Status: DISCONTINUED | OUTPATIENT
Start: 2022-07-14 | End: 2022-07-15

## 2022-07-14 RX ORDER — DIPHENHYDRAMINE HYDROCHLORIDE AND LIDOCAINE HYDROCHLORIDE AND ALUMINUM HYDROXIDE AND MAGNESIUM HYDRO
10 KIT EVERY 6 HOURS PRN
Status: DISCONTINUED | OUTPATIENT
Start: 2022-07-14 | End: 2022-07-16

## 2022-07-14 RX ORDER — PROCHLORPERAZINE MALEATE 5 MG
10 TABLET ORAL EVERY 6 HOURS PRN
Status: DISCONTINUED | OUTPATIENT
Start: 2022-07-14 | End: 2022-07-15

## 2022-07-14 RX ORDER — HYDROMORPHONE HYDROCHLORIDE 1 MG/ML
0.3 INJECTION, SOLUTION INTRAMUSCULAR; INTRAVENOUS; SUBCUTANEOUS
Status: DISCONTINUED | OUTPATIENT
Start: 2022-07-14 | End: 2022-07-14

## 2022-07-14 RX ORDER — DEXTROSE MONOHYDRATE 25 G/50ML
25-50 INJECTION, SOLUTION INTRAVENOUS
Status: DISCONTINUED | OUTPATIENT
Start: 2022-07-14 | End: 2022-07-16 | Stop reason: HOSPADM

## 2022-07-14 RX ORDER — AMLODIPINE BESYLATE 5 MG/1
5 TABLET ORAL DAILY
Status: DISCONTINUED | OUTPATIENT
Start: 2022-07-14 | End: 2022-07-16 | Stop reason: HOSPADM

## 2022-07-14 RX ADMIN — PANTOPRAZOLE SODIUM 40 MG: 40 TABLET, DELAYED RELEASE ORAL at 08:04

## 2022-07-14 RX ADMIN — NICOTINE 1 PATCH: 14 PATCH, EXTENDED RELEASE TRANSDERMAL at 03:26

## 2022-07-14 RX ADMIN — HYDROMORPHONE HYDROCHLORIDE 0.5 MG: 1 INJECTION, SOLUTION INTRAMUSCULAR; INTRAVENOUS; SUBCUTANEOUS at 21:19

## 2022-07-14 RX ADMIN — CLINDAMYCIN PHOSPHATE 600 MG: 600 INJECTION, SOLUTION INTRAVENOUS at 07:57

## 2022-07-14 RX ADMIN — CLINDAMYCIN PHOSPHATE 600 MG: 600 INJECTION, SOLUTION INTRAVENOUS at 23:27

## 2022-07-14 RX ADMIN — NYSTATIN 500000 UNITS: 100000 SUSPENSION ORAL at 13:30

## 2022-07-14 RX ADMIN — Medication 15 ML: at 11:55

## 2022-07-14 RX ADMIN — CLINDAMYCIN PHOSPHATE 600 MG: 600 INJECTION, SOLUTION INTRAVENOUS at 16:23

## 2022-07-14 RX ADMIN — HYDROMORPHONE HYDROCHLORIDE 0.5 MG: 1 INJECTION, SOLUTION INTRAMUSCULAR; INTRAVENOUS; SUBCUTANEOUS at 16:24

## 2022-07-14 RX ADMIN — SODIUM CHLORIDE 1000 ML: 9 INJECTION, SOLUTION INTRAVENOUS at 00:43

## 2022-07-14 RX ADMIN — HYDROMORPHONE HYDROCHLORIDE 0.5 MG: 1 INJECTION, SOLUTION INTRAMUSCULAR; INTRAVENOUS; SUBCUTANEOUS at 07:57

## 2022-07-14 RX ADMIN — HYDROMORPHONE HYDROCHLORIDE 0.5 MG: 1 INJECTION, SOLUTION INTRAMUSCULAR; INTRAVENOUS; SUBCUTANEOUS at 18:39

## 2022-07-14 RX ADMIN — DEXTROSE AND SODIUM CHLORIDE: 5; 900 INJECTION, SOLUTION INTRAVENOUS at 14:27

## 2022-07-14 RX ADMIN — OXYCODONE HYDROCHLORIDE 5 MG: 5 TABLET ORAL at 19:24

## 2022-07-14 RX ADMIN — NICOTINE 1 PATCH: 14 PATCH, EXTENDED RELEASE TRANSDERMAL at 08:05

## 2022-07-14 RX ADMIN — CLINDAMYCIN IN 5 PERCENT DEXTROSE 600 MG: 12 INJECTION, SOLUTION INTRAVENOUS at 00:48

## 2022-07-14 RX ADMIN — HYDROMORPHONE HYDROCHLORIDE 0.5 MG: 1 INJECTION, SOLUTION INTRAMUSCULAR; INTRAVENOUS; SUBCUTANEOUS at 14:19

## 2022-07-14 RX ADMIN — HYDROMORPHONE HYDROCHLORIDE 0.5 MG: 1 INJECTION, SOLUTION INTRAMUSCULAR; INTRAVENOUS; SUBCUTANEOUS at 11:55

## 2022-07-14 RX ADMIN — HYDROMORPHONE HYDROCHLORIDE 0.5 MG: 1 INJECTION, SOLUTION INTRAMUSCULAR; INTRAVENOUS; SUBCUTANEOUS at 23:27

## 2022-07-14 RX ADMIN — NYSTATIN 500000 UNITS: 100000 SUSPENSION ORAL at 19:24

## 2022-07-14 RX ADMIN — DEXTROSE AND SODIUM CHLORIDE: 5; 900 INJECTION, SOLUTION INTRAVENOUS at 03:09

## 2022-07-14 RX ADMIN — HYDROMORPHONE HYDROCHLORIDE 0.3 MG: 1 INJECTION, SOLUTION INTRAMUSCULAR; INTRAVENOUS; SUBCUTANEOUS at 03:26

## 2022-07-14 ASSESSMENT — MIFFLIN-ST. JEOR: SCORE: 1361.37

## 2022-07-14 ASSESSMENT — ACTIVITIES OF DAILY LIVING (ADL)
ADLS_ACUITY_SCORE: 24

## 2022-07-14 NOTE — PROGRESS NOTES
"Clinical Swallow Evaluation (CSE):    diet modifications given odynophagia at this time: puree solids (IDDSI 4), thin liquds (IDDSI 0)     07/14/22 0916   General Information   Onset of Illness/Injury or Date of Surgery 07/14/22   Referring Physician Dr. Mendoza   Patient/Family Therapy Goal Statement (SLP) To reduce pain   Pertinent History of Current Problem   Pt admitted with sore throat, decrease PO intake, vomitting and weight loss - CT  soft tissue neck revealed \"Abnormal soft tissue prominence in the posterior aspect of the oropharynx may reflect diffuse enlargement of the palatine tonsils/pharyngitis. Oropharyngeal mass lesion would be difficult to exclude and clinical evaluation with visualization of this   region recommended; Mild enlargement and hyperemia asymmetrically involves the left submandibular gland and left sublingual gland, favored to be reactive.\" Work up for malignancy versus infection. ENT consulted given oropharygneal mass. SLP consulted for dysphagia assessment. Potential GI consult.     General Observations Pt alert, pleasant, ongoing pain, just finished up breakfast (ate 4 oz pudding, 4 oz yogurt, 100% coffee)   Past History of Dysphagia None per pt report or EMR   Pain Assessment   Patient Currently in Pain Yes, see Vital Sign flowsheet  (6/10 throat pain at rest, increasing to 9/10 throat pain with swallowing)   Type of Evaluation   Type of Evaluation Swallow Evaluation   Oral Motor   Oral Musculature generally intact   Structural Abnormalities none present   Mucosal Quality good   Dentition (Oral Motor)   Dentition (Oral Motor) natural dentition;adequate dentition   Facial Symmetry (Oral Motor)   Facial Symmetry (Oral Motor) WNL   Lip Function (Oral Motor)   Lip Range of Motion (Oral Motor) WNL   Tongue Function (Oral Motor)   Tongue ROM (Oral Motor) WNL   Jaw Function (Oral Motor)   Jaw Function (Oral Motor) WNL   Cough/Swallow/Gag Reflex (Oral Motor)   Soft Palate/Velum (Oral Motor) " WNL   Volitional Throat Clear/Cough (Oral Motor) WNL   Volitional Swallow (Oral Motor) weak   Vocal Quality/Secretion Management (Oral Motor)   Vocal Quality (Oral Motor) WNL   Secretion Management (Oral Motor) WNL   General Swallowing Observations   Respiratory Support (General Swallowing Observations) none   Current Diet/Method of Nutritional Intake (General Swallowing Observations, NIS) full liquid diet;thin liquids (level 0)   Swallowing Evaluation Clinical swallow evaluation   Clinical Swallow Evaluation   Feeding Assistance no assistance needed   Clinical Swallow Evaluation Textures Trialed thin liquids;pureed   Clinical Swallow Eval: Thin Liquid Texture Trial   Mode of Presentation, Thin Liquids straw;self-fed   Volume of Liquid or Food Presented sips   Oral Phase of Swallow WFL   Pharyngeal Phase of Swallow reduction in laryngeal movement   Diagnostic Statement no overt clinical signs/sx aspiration including clear vocal quality and clear cued cough after trials   Clinical Swallow Evaluation: Puree Solid Texture Trial   Mode of Presentation, Puree spoon;self-fed   Volume of Puree Presented bites   Oral Phase, Puree WFL   Pharyngeal Phase, Puree reduction in laryngeal movement   Diagnostic Statement no overt clinical signs/sx aspiration including clear vocal quality and clear cued cough after trials   Swallowing Recommendations   Diet Consistency Recommendations pureed (level 4);thin liquids (level 0)   Supervision Level for Intake patient independent   Medication Administration Recommendations, Swallowing (SLP) RN and pt reported good tolerance of small medication whole with water this AM, place in puree if needed   Instrumental Assessment Recommendations reassess via non-instrumental clinical swallow evaluation   General Therapy Interventions   Planned Therapy Interventions Dysphagia Treatment   Clinical Impression   Criteria for Skilled Therapeutic Interventions Met (SLP Eval) Yes, treatment indicated   SLP  Diagnosis suspected pharyngeal dysphagia   Risks & Benefits of therapy have been explained evaluation/treatment results reviewed;care plan/treatment goals reviewed;participants voiced agreement with care plan;participants included;patient   Clinical Impression Comments   Clinical swallow evaluation completed; limited overall assessment as pt just finished up breakfast + severity of  odynophagia (rating throat pain 6/10 at rest and increases to 9/10 with swallowing). Pt assessed with thin liquids/puree solids only at this time again given odynophagia, he pleasantly declined even soft chewable trials. Oromotor exam WFL. Oral phase of swallow appears WFL with functional labial seal, oral transit, oral clearance. Some hesitancy/delay in swallow supsect related to pain. Laryngeal elevation presention, though questionably reduced to palpation. No overt clinical signs/sx aspiration noted including clear vocal quality and clear cued cough following both thin/puree items. Pt denied sticking sensation when asked. Recommend puree food options for pt comfort. Educated on possibility of VFSS for further assessment, though pt declines need at this time - can complete following other consults (e.g., ENT) if indicated.     SLP Discharge Planning   SLP Discharge Recommendation home;home with outpatient speech therapy   SLP Rationale for DC Rec potential OP SLP follow up pending further medical work up, clinical progression   SLP Brief overview of current status  diet modifications given odynophagia at this time: puree solids (IDDSI 4), thin liquds (IDDSI 0)    Total Evaluation Time   Total Evaluation Time (Minutes) 10   SLP Goals   Therapy Frequency (SLP Eval) 4 times/wk   SLP Predicted Duration/Target Date for Goal Attainment 07/21/22   SLP Goals Swallow   SLP: Safely tolerate diet without signs/symptoms of aspiration Regular diet;Thin liquids;Independently

## 2022-07-14 NOTE — ED PROVIDER NOTES
EMERGENCY DEPARTMENT ENCOUNTER      NAME: Iain Fuentes  AGE: 51 year old male  YOB: 1970  MRN: [id number]  EVALUATION DATE & TIME: No admission date for patient encounter.    PCP: Abdiel Kelly    ED PROVIDER: Gregg Alfaro M.D.      Chief Complaint   Patient presents with     Pharyngitis         FINAL IMPRESSION:  Acute kidney injury  Left retropharyngeal mass  Anorexia      ED COURSE & MEDICAL DECISION MAKING:    Pertinent Labs & Imaging studies reviewed. (See chart for details)  51 year old male presents to the Emergency Department for evaluation of worsening sore throat, anorexia, vomiting.  Patient reports been ill for the last few weeks.  Started with multiple episodes of vomiting.  Was seen and diagnosed with acute kidney injury.  Improved with Zofran.  However continued to have oropharyngeal discomfort.  Seen on July 7.  Strep screen was negative.  Started on Augmentin.  Symptoms continue to worsen.  Patient now reporting achiness in the left side of his throat which worsens significantly with swallowing.  Reports very little oral intake.  On exam he is a thin cachectic appearing male.  Patient with slight prominences at the base of teeth numbers 19 and 20 on the lingual aspect.  Nontender.  Posterior left oropharyngeal area prominent and hyperemic.  No obvious adenopathy.  Mild left-sided paratracheal tenderness.  CT imaging laboratory evaluation from triage area with left retropharyngeal mass.  Not clearly a phlegmon nor infectious process.  Patient also with acute kidney injury.  Creatinine 2.43 typically normal.  Intravenous fluids ordered along with clindamycin as patient not responding to Augmentin.  Plan will be for hospitalization.  Greater concern is potential cancerous lesion given absence of improvement with antibiotics.  Patient seen briefly in the triage area secondary to severe emergency room overcrowding.. Patient appears non toxic  11:22 PM I met with the patient  for the initial interview and physical examination. Discussed plan for treatment and workup in the ED.  Discussed plan for admission and possible HENT consult as an inpatient.  12:14 AM Spoke with Dr. Christina at [facility] who accepts the patient for admission. The patient will be transferred.  12:20 AM Updated patient on transfer. He is agreeable with plan for transfer.  COVID swab to be obtained.    At the conclusion of the encounter I discussed the results of all of the tests and the disposition. The questions were answered and return precautions provided. The patient or family acknowledged understanding and was agreeable with the care plan.       PPE: Provider wore gloves, N95 mask, eye protection, surgical cap.     MEDICATIONS GIVEN IN THE EMERGENCY:  Medications   0.9% sodium chloride BOLUS (has no administration in time range)   sodium chloride 0.9% infusion (has no administration in time range)   clindamycin (CLEOCIN) infusion 600 mg (has no administration in time range)   iopamidol (ISOVUE-370) solution 75 mL (75 mLs Intravenous Given 7/13/22 1911)       NEW PRESCRIPTIONS STARTED AT TODAY'S ER VISIT  New Prescriptions    No medications on file          =================================================================    HPI    Patient information was obtained from: Patient    Use of Intrepreter: N/A       Iain Fuentes is a 51 year old male with a pertient medical history of HTN and tobacco abuse who presents to the ED for evaluation of throat problem.    The patient reports he has had a few weeks of ongoing throat pain and difficulty swallowing. He attributes his symptoms to growths in his lower left mouth. Since his symptoms began he has had difficulty eating and drinking due to the pain, swallowing difficulty, nausea, and vomiting. He has presented to urgent care multiple times and was given Zofran, Augmentin, and dexamethasone, but his symptoms have continued to worsen. He also reports he has  been drooling frequently and feels congested, no runny nose.    He is a smoker, 0.5 packs/day.    REVIEW OF SYSTEMS   Constitutional:  Denies fever, chills  HENT: Denies rhinorrhea. Endorses congestion, trouble swallowing, sore throat.  Respiratory:  Denies productive cough or increased work of breathing  Cardiovascular:  Denies chest pain, palpitations  GI:  Denies abdominal pain, or change in bowel or bladder habits. Endorses nausea, vomiting.  Musculoskeletal:  Denies any new muscle/joint swelling  Skin:  Denies rash   Neurologic:  Denies focal weakness  All systems negative except as marked.     PAST MEDICAL HISTORY:  Past Medical History:   Diagnosis Date     Alcohol abuse      Hypertension      Lumbar herniated disc      Marijuana dependence (H)        PAST SURGICAL HISTORY:  Past Surgical History:   Procedure Laterality Date     ARTHROSCOPY KNEE WITH MENISCUS ALLOGRAFT Left      ZZC NONSPECIFIC PROCEDURE  1997    facial surgery/repair after trauma         CURRENT MEDICATIONS:      Current Facility-Administered Medications:      0.9% sodium chloride BOLUS, 1,000 mL, Intravenous, Once, Gregg Alfaro MD     clindamycin (CLEOCIN) infusion 600 mg, 600 mg, Intravenous, Once, Gregg Alfaro MD     sodium chloride 0.9% infusion, 1,000 mL, Intravenous, Continuous, Gregg Alfaro MD    Current Outpatient Medications:      amLODIPine (NORVASC) 5 MG tablet, TAKE 1 TABLET BY MOUTH EVERY DAY, Disp: 90 tablet, Rfl: 0     azithromycin (ZITHROMAX) 250 MG tablet, Take 2 tablets (500 mg) by mouth daily for 1 day, THEN 1 tablet (250 mg) daily for 4 days., Disp: 6 tablet, Rfl: 0     Ibuprofen (ADVIL PO), , Disp: , Rfl:      lisinopril-hydrochlorothiazide (ZESTORETIC) 20-12.5 MG tablet, TAKE 1 TABLET BY MOUTH DAILY, Disp: 90 tablet, Rfl: 0     magic mouthwash (ENTER INGREDIENTS IN COMMENTS) suspension, Swish and spit 5-10 mLs in mouth every 6 hours as needed 30 ml of Benadryl (12.5 mg/5 ml), 60 ml Maalox, 30 ml Viscous  Lidocaine, Disp: 120 mL, Rfl: 0     tadalafil (CIALIS) 20 MG tablet, Take 0.5-1 tablets (10-20 mg) by mouth every 48 hours as needed for erectile dysfunction, Disp: 6 tablet, Rfl: 11    ALLERGIES:  No Known Allergies    FAMILY HISTORY:  Family History   Problem Relation Age of Onset     Arthritis Father      Blood Disease Father         amyloidosis     Depression Mother         committed suicide      Hypertension Brother      Cancer Paternal Grandmother          of lung cancer     Gastrointestinal Disease Paternal Grandfather         pancreatic disorder       SOCIAL HISTORY:   Social History     Socioeconomic History     Marital status: Single     Spouse name: None     Number of children: 0     Years of education: 15     Highest education level: None   Occupational History     Occupation: [employer]     Employer: [employer]   Tobacco Use     Smoking status: Current Every Day Smoker     Packs/day: 1.00     Years: 20.00     Pack years: 20.00     Types: Cigarettes     Start date: 12/15/1985     Smokeless tobacco: Never Used   Substance and Sexual Activity     Alcohol use: Yes     Drug use: Yes     Types: Marijuana     Sexual activity: Never       VITALS:  Patient Vitals for the past 24 hrs:   BP Temp Temp src Pulse Resp SpO2 Weight   22 1550 115/70 98.6  F (37  C) Oral 98 16 96 % 54.9 kg (121 lb)        PHYSICAL EXAM    Constitutional:  Awake, alert, in moderate apparent distress  HENT:  Normocephalic, Atraumatic. Bilateral external ears normal. Oropharynx moist. Nose normal. Firm protuberances at the base of teeth 19 and 20 projecting medially along the lingual surface.  Nontender.  Fullness and slight hyperemia along the left oropharynx. No lymphadenopathy. Neck- Normal range of motion with no guarding, No midline cervical tenderness, Supple, No stridor.  Mild left paratracheal tenderness  Eyes:  PERRL, EOMI with no signs of entrapment, Conjunctiva normal, No discharge.   Respiratory:  Normal breath  sounds, No respiratory distress, No wheezing.    Cardiovascular:  Normal heart rate, Normal rhythm, No appreciable rubs or gallops.   GI:  Soft, No tenderness, No distension, No palpable masses  Musculoskeletal:  Good range of motion in all major joints.  Integument:  Warm, Dry, No erythema, No rash.   Neurologic:  Alert & oriented, Normal motor function, Normal sensory function, No focal deficits noted.   Psychiatric:  Affect normal, Judgment normal, Mood normal.     LAB:  All pertinent labs reviewed and interpreted.  Results for orders placed or performed during the hospital encounter of 07/14/22   Soft tissue neck CT w contrast    Impression    IMPRESSION:   1.  Abnormal soft tissue prominence in the posterior aspect of the oropharynx may reflect diffuse enlargement of the palatine tonsils/pharyngitis. Oropharyngeal mass lesion would be difficult to exclude and clinical evaluation with visualization of this   region recommended, as well as clinical follow-up to resolution. If the symptoms were to persist, follow-up imaging would be recommended.    2.  Mild enlargement and hyperemia asymmetrically involves the left submandibular gland and left sublingual gland, favored to be reactive.    3.  No pathologic adenopathy.   Basic metabolic panel   Result Value Ref Range    Sodium 136 136 - 145 mmol/L    Potassium 4.4 3.5 - 5.0 mmol/L    Chloride 95 (L) 98 - 107 mmol/L    Carbon Dioxide (CO2) 25 22 - 31 mmol/L    Anion Gap 16 5 - 18 mmol/L    Urea Nitrogen 38 (H) 8 - 22 mg/dL    Creatinine 2.43 (H) 0.70 - 1.30 mg/dL    Calcium 10.2 8.5 - 10.5 mg/dL    Glucose 65 (L) 70 - 125 mg/dL    GFR Estimate 31 (L) >60 mL/min/1.73m2   CBC with platelets and differential   Result Value Ref Range    WBC Count 9.2 4.0 - 11.0 10e3/uL    RBC Count 4.25 (L) 4.40 - 5.90 10e6/uL    Hemoglobin 13.8 13.3 - 17.7 g/dL    Hematocrit 40.5 40.0 - 53.0 %    MCV 95 78 - 100 fL    MCH 32.5 26.5 - 33.0 pg    MCHC 34.1 31.5 - 36.5 g/dL    RDW 12.2  10.0 - 15.0 %    Platelet Count 414 150 - 450 10e3/uL    % Neutrophils 61 %    % Lymphocytes 26 %    % Monocytes 11 %    % Eosinophils 1 %    % Basophils 1 %    % Immature Granulocytes 0 %    NRBCs per 100 WBC 0 <1 /100    Absolute Neutrophils 5.6 1.6 - 8.3 10e3/uL    Absolute Lymphocytes 2.4 0.8 - 5.3 10e3/uL    Absolute Monocytes 1.0 0.0 - 1.3 10e3/uL    Absolute Eosinophils 0.1 0.0 - 0.7 10e3/uL    Absolute Basophils 0.1 0.0 - 0.2 10e3/uL    Absolute Immature Granulocytes 0.0 <=0.4 10e3/uL    Absolute NRBCs 0.0 10e3/uL       RADIOLOGY:  Reviewed all pertinent imaging. Please see official radiology report.  Soft tissue neck CT w contrast   Final Result   IMPRESSION:    1.  Abnormal soft tissue prominence in the posterior aspect of the oropharynx may reflect diffuse enlargement of the palatine tonsils/pharyngitis. Oropharyngeal mass lesion would be difficult to exclude and clinical evaluation with visualization of this    region recommended, as well as clinical follow-up to resolution. If the symptoms were to persist, follow-up imaging would be recommended.      2.  Mild enlargement and hyperemia asymmetrically involves the left submandibular gland and left sublingual gland, favored to be reactive.      3.  No pathologic adenopathy.              I, Shivam Hugh, am serving as a scribe to document services personally performed by Gregg Alfaro MD, based on my observation and the provider's statements to me. I, Gregg Alfaro MD attest that Shivam Reese is acting in a scribe capacity, has observed my performance of the services and has documented them in accordance with my direction.    Gregg Alfaro M.D.  Emergency Medicine  [facility] EMERGENCY DEPARTMENT     Gregg Alfaro MD  07/14/22 0026       Gregg Alfaro MD  07/14/22 0423

## 2022-07-14 NOTE — PHARMACY-ADMISSION MEDICATION HISTORY
Admission medication history interview status for this patient is complete. See [facility] admission navigator for allergy information, prior to admission medications and immunization status.     Medication history interview done, indicate source(s): Patient  Medication history resources (including written lists, pill bottles, clinic record): Sure Scripts  Pharmacy: [facility] in [place] on [address]    Changes made to PTA medication list:  Added: none  Changed: none  Removed: ibuprofen    Actions taken by pharmacist (provider contacted, etc):None     Additional medication history information:None    Medication reconciliation/reorder completed by provider prior to medication history?  Y   (Y/N)     Prior to Admission medications    Medication Sig Last Dose Taking? Auth Provider Long Term End Date   amLODIPine (NORVASC) 5 MG tablet TAKE 1 TABLET BY MOUTH EVERY DAY 7/13/2022 Yes Abdiel Kelly MD Yes    azithromycin (ZITHROMAX) 250 MG tablet Take 2 tablets (500 mg) by mouth daily for 1 day, THEN 1 tablet (250 mg) daily for 4 days. 7/13/2022 at day 1, took 500 mg Yes Gucci Fragoso PA-C  7/16/22   lisinopril-hydrochlorothiazide (ZESTORETIC) 20-12.5 MG tablet TAKE 1 TABLET BY MOUTH DAILY 7/13/2022 Yes Abdiel Kelly MD Yes    magic mouthwash (ENTER INGREDIENTS IN COMMENTS) suspension Swish and spit 5-10 mLs in mouth every 6 hours as needed 30 ml of Benadryl (12.5 mg/5 ml), 60 ml Maalox, 30 ml Viscous Lidocaine  Yes Gucci Fragoso PA-C     tadalafil (CIALIS) 20 MG tablet Take 0.5-1 tablets (10-20 mg) by mouth every 48 hours as needed for erectile dysfunction   Abdiel Kelly MD Yes

## 2022-07-14 NOTE — PROGRESS NOTES
Please see admit H&P from Dr. Helms from earlier this morning.    I saw and examined this patient today    Pt presented with difficulty swallowing, weight loss, sore throat x 2-3 weeks.  He was treated as output for presumed infection (antibioics and dexamethasone) without improvement in sx    On presentation CT soft tissue neck with contrast showed abnormal soft tissue prominence in the posterior aspect of the oropharynx which could be diffuse enlargement of the palatine tonsils pharyngitis versus oropharyngeal mass.    Assessment:  - oropharyngeal mass, concerning for malignancy (less likely infectious)    Plans:  - Agree with ENT consult.    - ENT considering biopsy in OR tomorrow AM  - speech path consult to assess swallow ftn

## 2022-07-14 NOTE — CONSULTS
Consult Date: 07/14/2022    HISTORY OF PRESENT ILLNESS:  The patient is a 51-year-old male who has had complaints of sore throat for the past 2 weeks.  He has had difficulty with eating and has had a 20-pound weight loss in this time.  He has been treated with Augmentin for 3 days, and then change to Zithromax, and then given steroids.  He had a CT scan of his neck, which showed abnormal soft tissue prominence in the posterior aspect of the oropharynx.  He had an appointment with Dr. Lawrence yesterday, but this was canceled, and then Dr. Mcdaniel also did not see him, and was suggested transfer to [facility], then subsequently transferred to [facility].  He is not reporting significant ear pain.  He is not reporting any problems with his breathing, but does note dysphagia.  He is not reporting any other current ear, nose, or throat complaints or problems.    MEDICATIONS:  Currently include clindamycin, Norvasc, ibuprofen, lisinopril, hydrochlorothiazide.    ALLERGIES:  HE HAS NO KNOWN ALLERGIES.    SOCIAL HISTORY:  He is a current smoker and does use marijuana.    PHYSICAL EXAMINATION:    ENT:  Revealed the ears to be clear.  The nose was relatively clear anteriorly.  The oral cavity revealed a large mass in the posterior oropharynx.  There is a delineation inferiorly and the hypopharynx does not appear to be significantly affected.  This is consistent with a tumor.  There was no significant ulceration, but there is some exudative debris that may be associated with a roughened surface of the tumor mass.  We discussed the potential for fiberoptic examination from above to see if there was a clear origin.  The fiberoptic scope was passed on the right side.  This appears to be primarily left sided, but there is not a clear obvious origin of this, other than the posterior oropharyngeal wall.  The neck shows some mild cervical adenopathy on the left side, primarily in the left jugulodigastric region.  At this point, he  has a fairly strong gag reflex.  I think he would need a biopsy to have a definitive diagnosis.  This potentially could be done in the office where we would have accessibility to cautery, or he could have a brief anesthetic and have a biopsy in the operating room for definitive diagnosis and then subsequent therapy.  We will see if there is availability in the operating room in the next day.    Again, thank you for allowing us to participate in Mr. Fuentes's care.    Abdiel Laughlin MD        D: 2022   T: 2022   MT: JEAN CLAUDE    Name:     LOIDA FUENTES  MRN:      [id number]        Account:      [id number]   :      1970           Consult Date: 2022     Document: [id number]

## 2022-07-14 NOTE — PLAN OF CARE
A&O.  VSS on RA.  0.3 IV dilaudid for mouth pain.  Independent in room.  Voiding.  Tolerating full liquids with some pain swallowing.  Plan is ENT consult today.

## 2022-07-14 NOTE — CONSULTS
"CLINICAL NUTRITION SERVICES - ASSESSMENT NOTE     Nutrition Prescription    Recommendations:  Diet per SLP   Ordered Ensure Enlive BID with meals - chocolate  Ordered MVI+M - liquid per RN request     MALNUTRITION:  % Weight Loss: unable to determine - limited weight history   % Intake:  </= 50% for >/= 5 days (severe malnutrition)  Subcutaneous Fat Loss:  Orbital region moderate depletion and Upper arm region moderate depletion   Muscle Loss:  Temporal region moderate depletion, Clavicle bone region moderate-severe depletion, Acromion bone region moderate depletion, Scapular bone region moderate depletion, Anterior thigh region moderate depletion and Posterior calf region moderate depletion  Fluid Retention: none noted    Malnutrition Diagnosis: Severe malnutrition  In Context of:  Acute illness or injury  Chronic illness or disease       REASON FOR ASSESSMENT  Iain Fuentes is a/an 51 year old male assessed by the dietitian for Reason For Assessment: Provider order    See \"Adult Nutrition Assessment\" flowsheet for additional details.    - Information obtained from patient and chart   - Diet at home: regular diet --> no changes to textures   - Meal/Snack Patterns: pt reports fasting x 6 days PTA (since last Friday) given difficulty swallowing. Typically when feeling well he consumes 2 meals per day + snacks  - Factors Affecting Nutritional Intake: difficulty/impaired swallowing  - Supplemental Drinks/Foods/Additives: none  - Food Allergy(s)/Intolerance(s): NKFA    - Weight history:  Wt Readings from Last 10 Encounters:   07/14/22 53.2 kg (117 lb 3.2 oz)   07/13/22 54.9 kg (121 lb)   07/11/22 54.9 kg (121 lb)   02/25/21 64.5 kg (142 lb 4.8 oz)   04/23/20 62.6 kg (138 lb)   04/30/19 67.1 kg (148 lb)   08/06/18 68.3 kg (150 lb 8 oz)   06/01/18 69.2 kg (152 lb 8 oz)   01/29/18 71.3 kg (157 lb 3.2 oz)   06/14/17 68.9 kg (152 lb)     - limited weight history to comment on. pt reports a weight loss of 17 lbs in the " past 2 weeks    CURRENT NUTRITION ORDERS  Diet/Nutrition Received: pureed (level 4)    Current Intake/Tolerance:  No intakes noted to comment on     ASSESSED NUTRITION NEEDS:  Weight for Calculation (kg): 53.2 kg (117 lb 4.6 oz)    Estimated Energy Needs: 2462-9462+ (30-35+ kcal/kg)   Justification: repletion and underweight  Estimated Protein Needs: 64-80+ grams protein (1.2-1.5+ g pro/Kg)  Justification: Repletion and preservation of lean body mass  Estimated Fluid Needs: per MD    Nutrition Diagnosis: inadequate oral intake related to diffuclty swallowing in the setting of suspcted malicnancy as evidenced by pt consuming <50% of nutritional needs over the past >5 days    INTERVENTIONS  Recommendations  See above    Implementation  Medical Food Supplement and Multivitamin/Mineral: as above    Collaboration and referral of nutrition-related care: Discussed POC with RN.    Goals  Nutrition Goals: pt to consume >/=75% of meals or oral nutrition supplements ordered TID     Monitoring/Evaluation  Progress towards goals will be monitored and evaluated per protocol and Practice Guidelines    Sindy Davis RD, LD  Clinical Dietitian     3rd floor/ICU: [phone]  All other floors: [phone]  Weekend/holiday: [phone]  Office: [phone]

## 2022-07-14 NOTE — H&P
[facility]  Hospitalist Admission Note  Name: Iain Fuentes    MRN: [id number]  YOB: 1970    Age: 51 year old  Date of admission: (Not on file)  Primary care provider: Abdiel Kelly    Chief Complaint:  Sore throat    Assessment and Plan:   Sore throat  Decreased p.o. intake, vomiting, weight loss  Oropharyngeal mass concerning for malignancy versus less likely infection: Developed vomiting 3 weeks ago followed by sore throat 2 weeks ago that is persisted and is severe.  Reports 20 pound weight loss over this period of time.  Vomiting seems improved.  No fevers.  Previous group A strep testing negative.  X-ray neck 7/7 showed moderate thickening of the posterior oropharyngeal soft tissues and nonspecific swelling.  He received oral Augmentin 7/7 which he took 3 days of and then did not improvement so he was seen again and prescribed azithromycin which she only has taken the first day dose and dexamethasone 10 mg/day for 5-day course as well.  Afebrile here without leukocytosis.  COVID-19 PCR negative.  CT soft tissue neck with contrast shows abnormal soft tissue prominence in the posterior aspect of the oropharynx which could be diffuse enlargement of the palatine tonsils pharyngitis versus oropharyngeal mass.  There is also mild enlargement and hyperemia of the left submandibular and left sublingual glands.  No pathologic adenopathy seen.  Based on weights in the history tab he is down between 20 and 25 pounds from 1-1/2 years ago and he is a smoker so malignancy is high in the differential although with no intake for the last 3 weeks this could also result in substantial weight loss.  He is tender in the left submandibular gland area, but no palpable adenopathy.  There is fairly good visualization of the oropharynx on exam and there is an easily identified left-sided mass, difficult to say if coming from his tonsil or post tear oropharynx.  There is heavy white coating and  it is quite red.  There is no other white plaque to go with thrush although this is possible.  -Repeat rapid strep antigen with reflex PCR  -Check procalcitonin  -Consult ENT for further evaluation of oropharyngeal mass  -Empiric IV clindamycin in case infectious etiology given no improvement after 3 days of Augmentin and a dose of azithromycin.  Infection seems less likely, but will await ENT consultation.  May need to involve ID while here.  -Empiric nystatin swish and swallow 4 times daily  -Full liquid diet, consult SLP for dysphagia eval  -Acetaminophen, oral oxycodone, IV Dilaudid as needed for pain.  Avoiding NSAIDs due to YAYA  -Protonix 40 mg daily  -Magic mouthwash and sore throat lozenge as needed  -HIV screening  -Depending on ENT opinion may also need to involve GI for possible EGD given 3 weeks of vomiting and pain with swallowing, although the vomiting seems to have resolved and he does not have any abdominal pain.    YAYA: Creatinine baseline likely 1-1.1.  Presenting with YAYA with creatinine 2.4 secondary to poor p.o. intake.  He is also on lisinopril-HCTZ.  Received 1 L NS prior to transfer.  -Hold lisinopril-HCTZ  -IV D5 NS at 100 ml/and repeat BMP in the morning  -No NSAIDs initially    Tobacco dependence: Smokes half pack per day for the last 30 years.  -Nicotine patch    Hypoglycemia: Blood glucose 65 initially.  Secondary to poor p.o. intake.  -D5 NS IV fluids  -Hypoglycemia protocol  -Consult dietitian    HTN: PTA on amlodipine 5 mg daily and lisinopril-HCTZ 20-12.5 mg daily.  Initial /70.  -Resume amlodipine with hold parameters for low blood pressure  -Hold lisinopril-HCTZ due to YAYA    DVT Prophylaxis: Pneumatic Compression Devices  Code Status: Full Code  FEN: Full liquid diet, D5 NS at 100 ml/hr  Discharge Dispo: Home  Estimated Disch Date / # of Days until Disch: Admit inpatient for further work-up of oropharyngeal mass versus infection.  Failure of outpatient antibiotics.   Anticipate at least 2 night hospitalization.      History of Present Illness:  Iain Fuentes is a 51 year old male with PMH including HTN and tobacco dependence who presented to [facility] ER for ongoing issues with sore throat.  He reports that 3 weeks ago he developed intractable vomiting without abdominal pain.  Roughly 2 weeks ago he developed a sore throat that has progressed.  He also reports pain along the inside of his left lower teeth along the gumline.  The pain is now severe and he cannot tolerate very little p.o.  The vomiting seems to have subsided.  He has not any fevers, chills, or night sweats.  Denies any diarrhea.  Due to his ongoing sore throat initially was seen on 7/7 where he was prescribed Augmentin after he had a negative strep throat screen and x-ray that showed oropharyngeal swelling.  He took 3 doses and then return for evaluation due to worsening pain for which she was then prescribed azithromycin and dexamethasone 10 mg daily for which he only took 1 day of so far.  Went back to the ER again due to persistent severe pain and inability to take any p.o. at this point feeling very dehydrated.  He does smoke half pack per day for last 30 years.  He reports 20 pound weight loss over the last 3 weeks.    History obtained from patient, medical record, and from Dr. Maradiaga who took signout from Dr. Santamaria at [facility] emergency department.  Blood pressure 115/70, heart rate 98, temperature 90.6  F, oxygen 96% on room air.  Initial labs show YAYA with creatinine of 2.4 otherwise BMP unremarkable.  Blood glucose low at 65.  WBC 9.2, hemoglobin 13.8, platelet count 414.  COVID-19 PCR negative.  CT soft tissue neck with IV contrast shows abnormal soft tissue prominence in the posterior aspect of the oropharynx which could be diffuse enlargement of the palatine tonsils pharyngitis versus oropharyngeal mass.  There is also mild enlargement and hyperemia of the left submandibular and left  sublingual glands.  No pathologic adenopathy seen.  He is quite dehydrated due to lack of oral intake.  Concern for possible malignancy so he will need ENT consultation.  He received IV clindamycin 600 mg in case of possible infection and 1 L normal saline.  No beds available at [place] so he is transferred here to [facility] and will be admitted as an inpatient.       Clinically Significant Risk Factors Present on Admission          # Hypercalcemia: Ca = 10.2 mg/dL (Ref range: 8.5 - 10.5 mg/dL) and/or iCa = N/A on admission, will monitor as appropriate                           Past Medical History reviewed:  Past Medical History:   Diagnosis Date     Alcohol abuse      Hypertension      Lumbar herniated disc      Marijuana dependence (H)    Tobacco dependence    Past Surgical History reviewed:  Past Surgical History:   Procedure Laterality Date     ARTHROSCOPY KNEE WITH MENISCUS ALLOGRAFT Left      ZZC NONSPECIFIC PROCEDURE      facial surgery/repair after trauma     Social History reviewed:  Half pack per day smoker for the last 30 years  Some alcohol use    Family History reviewed:  Family History   Problem Relation Age of Onset     Arthritis Father      Blood Disease Father         amyloidosis     Depression Mother         committed suicide      Hypertension Brother      Cancer Paternal Grandmother          of lung cancer     Gastrointestinal Disease Paternal Grandfather         pancreatic disorder     Allergies:  No Known Allergies  Medications:  Prior to Admission medications    Medication Sig Last Dose Taking? Auth Provider Long Term End Date   amLODIPine (NORVASC) 5 MG tablet TAKE 1 TABLET BY MOUTH EVERY DAY   Abdiel Kelly MD Yes    azithromycin (ZITHROMAX) 250 MG tablet Take 2 tablets (500 mg) by mouth daily for 1 day, THEN 1 tablet (250 mg) daily for 4 days.   Gucci Fragoso, PA-C  22   Ibuprofen (ADVIL PO)    Reported, Patient     lisinopril-hydrochlorothiazide  (ZESTORETIC) 20-12.5 MG tablet TAKE 1 TABLET BY MOUTH DAILY   Abdiel Kelly MD Yes    magic mouthwash (ENTER INGREDIENTS IN COMMENTS) suspension Swish and spit 5-10 mLs in mouth every 6 hours as needed 30 ml of Benadryl (12.5 mg/5 ml), 60 ml Maalox, 30 ml Viscous Lidocaine   Gucci Fragoso, SOLITARIO     tadalafil (CIALIS) 20 MG tablet Take 0.5-1 tablets (10-20 mg) by mouth every 48 hours as needed for erectile dysfunction   Abdiel Kelly MD Yes      Review of Systems:  A Comprehensive greater than 10 system review of systems was carried out.  Pertinent positives and negatives are noted above.  Otherwise negative.     Physical Exam:  There were no vitals taken for this visit.  Wt Readings from Last 1 Encounters:   07/13/22 54.9 kg (121 lb)     Exam:  Constitutional: Awake, NAD   Eyes: sclera white, PERRL  HEENT: Dry mucous membranes.  Left-sided oropharyngeal mass with white plaque covering and overall erythema.  This protrudes towards the midline without any obvious occlusion of his airway.  Tenderness over the left-sided submandibular glands.  Respiratory: no respiratory distress, lungs cta bilaterally, no crackles or wheeze   Cardiovascular: RRR.  No murmur   GI: Thin, non-tender, not distended, bowel sounds present  Skin: no rash or lesions, acyanotic  Musculoskeletal/extremities: Decreased muscle bulk.  No edema  Neurologic: A&O, speech clear, answering questions appropriately  Psychiatric: calm, cooperative, normal affect    Lab and imaging data personally reviewed:  Labs:  Recent Labs   Lab 07/13/22  1740   WBC 9.2   HGB 13.8   HCT 40.5   MCV 95        Recent Labs   Lab 07/13/22  1740      POTASSIUM 4.4   CHLORIDE 95*   CO2 25   ANIONGAP 16   GLC 65*   BUN 38*   CR 2.43*   GFRESTIMATED 31*   TOSHA 10.2     COVID19 pcr negative    Imaging:  Recent Results (from the past 24 hour(s))   Soft tissue neck CT w contrast    Narrative    EXAM: CT SOFT TISSUE NECK W CONTRAST  LOCATION: [facility]  DATE/TIME: 7/13/2022 6:57 PM    INDICATION: Left mandibular soft tissue mass, intraoral sublingual mass. Mouth lesions.  COMPARISON: None.  CONTRAST: isovue 370  75ml  TECHNIQUE: Routine CT Soft Tissue Neck with IV contrast. Multiplanar reformats. Dose reduction techniques were used.    FINDINGS:     MUCOSAL SPACES/SOFT TISSUES: Abnormal soft tissue prominence in the posterior aspect of the oropharynx emanates slightly more medially than typically visualized for the palatine tonsils, although could reflect asymmetric, left greater than right   tonsillar enlargement. Other posterior oropharyngeal mass lesion would be difficult to exclude. This appears somewhat bilobed, see axial image 153 of series 3. There are some dystrophic calcifications in the lateral palatine tonsillar region on the left.   These findings could reflect tonsillitis/pharyngitis, again without evidence of focal abscess. Additionally, there is mild enlargement and hyperemia that asymmetrically involves the left submandibular gland and the left sublingual gland. There appears   to be subtle, nonlocalized and somewhat amorphous fluid type attenuation which resides along the inferior and lateral aspect of the left sublingual space. No evidence of focal abscess, however.  The findings in the left submandibular and sublingual gland   could be reactive in nature and there is no evidence of glandular or ductal stone.    Normal vocal cords and infraglottic trachea. Normal parapharyngeal space and subcutaneous soft tissues. Otherwise normal oral cavity,  spaces, and floor of mouth structures.    LYMPH NODES: No pathologic lymph nodes by size or morphology criteria.     SALIVARY GLANDS: Normal right parotid gland. Both submandibular glands demonstrate mild prominence of the intraglandular ducts without focal stone.    THYROID: Normal.     VESSELS: Vascular structures of the neck are patent.    VISUALIZED  INTRACRANIAL/ORBITS/SINUSES: No abnormality of the visualized intracranial compartment or orbits. Visualized paranasal sinuses and mastoid air cells are clear.    OTHER: No osseous destructive lesion with cervical degenerative changes.      Impression    IMPRESSION:   1.  Abnormal soft tissue prominence in the posterior aspect of the oropharynx may reflect diffuse enlargement of the palatine tonsils/pharyngitis. Oropharyngeal mass lesion would be difficult to exclude and clinical evaluation with visualization of this   region recommended, as well as clinical follow-up to resolution. If the symptoms were to persist, follow-up imaging would be recommended.    2.  Mild enlargement and hyperemia asymmetrically involves the left submandibular gland and left sublingual gland, favored to be reactive.    3.  No pathologic adenopathy.       Sandor Helms MD  Hospitalist  [facility]

## 2022-07-14 NOTE — PLAN OF CARE
Goal Outcome Evaluation:      Diet advanced to pureed + thin liquids by SLP. Ordered MVI+M and ensure enlive BID with meals.     Sindy Davis RD, LD  Clinical Dietitian     3rd floor/ICU: [phone]  All other floors: [phone]  Weekend/holiday: [phone]  Office: [phone]

## 2022-07-15 ENCOUNTER — ANESTHESIA EVENT (OUTPATIENT)
Dept: SURGERY | Facility: CLINIC | Age: 52
DRG: 146 | End: 2022-07-15
Payer: COMMERCIAL

## 2022-07-15 ENCOUNTER — ANESTHESIA (OUTPATIENT)
Dept: SURGERY | Facility: CLINIC | Age: 52
DRG: 146 | End: 2022-07-15
Payer: COMMERCIAL

## 2022-07-15 LAB
ANION GAP SERPL CALCULATED.3IONS-SCNC: 4 MMOL/L (ref 3–14)
BUN SERPL-MCNC: 14 MG/DL (ref 7–30)
CALCIUM SERPL-MCNC: 8.4 MG/DL (ref 8.5–10.1)
CHLORIDE BLD-SCNC: 107 MMOL/L (ref 94–109)
CO2 SERPL-SCNC: 27 MMOL/L (ref 20–32)
CREAT SERPL-MCNC: 1.06 MG/DL (ref 0.66–1.25)
GFR SERPL CREATININE-BSD FRML MDRD: 85 ML/MIN/1.73M2
GLUCOSE BLD-MCNC: 112 MG/DL (ref 70–99)
HGB BLD-MCNC: 10.7 G/DL (ref 13.3–17.7)
LAB DIRECTOR COMMENTS: NORMAL
LAB DIRECTOR DISCLAIMER: NORMAL
LAB DIRECTOR INTERPRETATION: NORMAL
LAB DIRECTOR METHODOLOGY: NORMAL
LAB DIRECTOR RESULTS: NORMAL
POTASSIUM BLD-SCNC: 3.7 MMOL/L (ref 3.4–5.3)
SODIUM SERPL-SCNC: 138 MMOL/L (ref 133–144)
SPECIMEN DESCRIPTION: NORMAL

## 2022-07-15 PROCEDURE — 250N000011 HC RX IP 250 OP 636: Performed by: INTERNAL MEDICINE

## 2022-07-15 PROCEDURE — 87624 HPV HI-RISK TYP POOLED RSLT: CPT | Performed by: OTOLARYNGOLOGY

## 2022-07-15 PROCEDURE — 250N000009 HC RX 250: Performed by: OTOLARYNGOLOGY

## 2022-07-15 PROCEDURE — 250N000013 HC RX MED GY IP 250 OP 250 PS 637: Performed by: INTERNAL MEDICINE

## 2022-07-15 PROCEDURE — 80048 BASIC METABOLIC PNL TOTAL CA: CPT | Performed by: INTERNAL MEDICINE

## 2022-07-15 PROCEDURE — 250N000013 HC RX MED GY IP 250 OP 250 PS 637: Performed by: OTOLARYNGOLOGY

## 2022-07-15 PROCEDURE — 250N000011 HC RX IP 250 OP 636: Performed by: OTOLARYNGOLOGY

## 2022-07-15 PROCEDURE — 272N000001 HC OR GENERAL SUPPLY STERILE: Performed by: OTOLARYNGOLOGY

## 2022-07-15 PROCEDURE — G0452 MOLECULAR PATHOLOGY INTERPR: HCPCS | Mod: 26 | Performed by: PATHOLOGY

## 2022-07-15 PROCEDURE — 250N000011 HC RX IP 250 OP 636: Performed by: NURSE ANESTHETIST, CERTIFIED REGISTERED

## 2022-07-15 PROCEDURE — 250N000009 HC RX 250: Performed by: NURSE ANESTHETIST, CERTIFIED REGISTERED

## 2022-07-15 PROCEDURE — 710N000009 HC RECOVERY PHASE 1, LEVEL 1, PER MIN: Performed by: OTOLARYNGOLOGY

## 2022-07-15 PROCEDURE — 36415 COLL VENOUS BLD VENIPUNCTURE: CPT | Performed by: HOSPITALIST

## 2022-07-15 PROCEDURE — 120N000001 HC R&B MED SURG/OB

## 2022-07-15 PROCEDURE — 360N000077 HC SURGERY LEVEL 4, PER MIN: Performed by: OTOLARYNGOLOGY

## 2022-07-15 PROCEDURE — 99232 SBSQ HOSP IP/OBS MODERATE 35: CPT | Performed by: HOSPITALIST

## 2022-07-15 PROCEDURE — 258N000003 HC RX IP 258 OP 636: Performed by: INTERNAL MEDICINE

## 2022-07-15 PROCEDURE — 250N000011 HC RX IP 250 OP 636: Performed by: ANESTHESIOLOGY

## 2022-07-15 PROCEDURE — 36415 COLL VENOUS BLD VENIPUNCTURE: CPT | Performed by: INTERNAL MEDICINE

## 2022-07-15 PROCEDURE — 999N000141 HC STATISTIC PRE-PROCEDURE NURSING ASSESSMENT: Performed by: OTOLARYNGOLOGY

## 2022-07-15 PROCEDURE — 88305 TISSUE EXAM BY PATHOLOGIST: CPT | Mod: 26

## 2022-07-15 PROCEDURE — 370N000017 HC ANESTHESIA TECHNICAL FEE, PER MIN: Performed by: OTOLARYNGOLOGY

## 2022-07-15 PROCEDURE — 88331 PATH CONSLTJ SURG 1 BLK 1SPC: CPT | Mod: TC | Performed by: OTOLARYNGOLOGY

## 2022-07-15 PROCEDURE — 88342 IMHCHEM/IMCYTCHM 1ST ANTB: CPT | Mod: 26

## 2022-07-15 PROCEDURE — 85018 HEMOGLOBIN: CPT | Performed by: HOSPITALIST

## 2022-07-15 PROCEDURE — 258N000003 HC RX IP 258 OP 636: Performed by: NURSE ANESTHETIST, CERTIFIED REGISTERED

## 2022-07-15 PROCEDURE — 88331 PATH CONSLTJ SURG 1 BLK 1SPC: CPT | Mod: 26 | Performed by: PATHOLOGY

## 2022-07-15 RX ORDER — NEOSTIGMINE METHYLSULFATE 1 MG/ML
VIAL (ML) INJECTION PRN
Status: DISCONTINUED | OUTPATIENT
Start: 2022-07-15 | End: 2022-07-15

## 2022-07-15 RX ORDER — DEXAMETHASONE SODIUM PHOSPHATE 4 MG/ML
10 INJECTION, SOLUTION INTRA-ARTICULAR; INTRALESIONAL; INTRAMUSCULAR; INTRAVENOUS; SOFT TISSUE ONCE
Status: COMPLETED | OUTPATIENT
Start: 2022-07-15 | End: 2022-07-15

## 2022-07-15 RX ORDER — OXYCODONE HYDROCHLORIDE 5 MG/1
5 TABLET ORAL EVERY 4 HOURS PRN
Status: DISCONTINUED | OUTPATIENT
Start: 2022-07-15 | End: 2022-07-16 | Stop reason: HOSPADM

## 2022-07-15 RX ORDER — LIDOCAINE HYDROCHLORIDE 10 MG/ML
INJECTION, SOLUTION INFILTRATION; PERINEURAL PRN
Status: DISCONTINUED | OUTPATIENT
Start: 2022-07-15 | End: 2022-07-15

## 2022-07-15 RX ORDER — ONDANSETRON 2 MG/ML
4 INJECTION INTRAMUSCULAR; INTRAVENOUS EVERY 6 HOURS PRN
Status: DISCONTINUED | OUTPATIENT
Start: 2022-07-15 | End: 2022-07-16 | Stop reason: HOSPADM

## 2022-07-15 RX ORDER — ACETAMINOPHEN 325 MG/1
650 TABLET ORAL EVERY 4 HOURS PRN
Status: DISCONTINUED | OUTPATIENT
Start: 2022-07-18 | End: 2022-07-16 | Stop reason: HOSPADM

## 2022-07-15 RX ORDER — PROPOFOL 10 MG/ML
INJECTION, EMULSION INTRAVENOUS PRN
Status: DISCONTINUED | OUTPATIENT
Start: 2022-07-15 | End: 2022-07-15

## 2022-07-15 RX ORDER — ONDANSETRON 4 MG/1
4 TABLET, ORALLY DISINTEGRATING ORAL EVERY 30 MIN PRN
Status: DISCONTINUED | OUTPATIENT
Start: 2022-07-15 | End: 2022-07-15 | Stop reason: HOSPADM

## 2022-07-15 RX ORDER — FENTANYL CITRATE 50 UG/ML
25 INJECTION, SOLUTION INTRAMUSCULAR; INTRAVENOUS EVERY 5 MIN PRN
Status: DISCONTINUED | OUTPATIENT
Start: 2022-07-15 | End: 2022-07-15 | Stop reason: HOSPADM

## 2022-07-15 RX ORDER — SODIUM CHLORIDE, SODIUM LACTATE, POTASSIUM CHLORIDE, CALCIUM CHLORIDE 600; 310; 30; 20 MG/100ML; MG/100ML; MG/100ML; MG/100ML
INJECTION, SOLUTION INTRAVENOUS CONTINUOUS
Status: DISCONTINUED | OUTPATIENT
Start: 2022-07-15 | End: 2022-07-15 | Stop reason: HOSPADM

## 2022-07-15 RX ORDER — POLYETHYLENE GLYCOL 3350 17 G/17G
17 POWDER, FOR SOLUTION ORAL DAILY
Status: DISCONTINUED | OUTPATIENT
Start: 2022-07-16 | End: 2022-07-16 | Stop reason: HOSPADM

## 2022-07-15 RX ORDER — EPINEPHRINE NASAL SOLUTION 1 MG/ML
SOLUTION NASAL PRN
Status: DISCONTINUED | OUTPATIENT
Start: 2022-07-15 | End: 2022-07-15 | Stop reason: HOSPADM

## 2022-07-15 RX ORDER — PROCHLORPERAZINE MALEATE 5 MG
10 TABLET ORAL EVERY 6 HOURS PRN
Status: DISCONTINUED | OUTPATIENT
Start: 2022-07-15 | End: 2022-07-16 | Stop reason: HOSPADM

## 2022-07-15 RX ORDER — HYDROMORPHONE HCL IN WATER/PF 6 MG/30 ML
0.2 PATIENT CONTROLLED ANALGESIA SYRINGE INTRAVENOUS EVERY 5 MIN PRN
Status: DISCONTINUED | OUTPATIENT
Start: 2022-07-15 | End: 2022-07-15 | Stop reason: HOSPADM

## 2022-07-15 RX ORDER — GLYCOPYRROLATE 0.2 MG/ML
INJECTION, SOLUTION INTRAMUSCULAR; INTRAVENOUS PRN
Status: DISCONTINUED | OUTPATIENT
Start: 2022-07-15 | End: 2022-07-15

## 2022-07-15 RX ORDER — ONDANSETRON 2 MG/ML
INJECTION INTRAMUSCULAR; INTRAVENOUS PRN
Status: DISCONTINUED | OUTPATIENT
Start: 2022-07-15 | End: 2022-07-15

## 2022-07-15 RX ORDER — ACETAMINOPHEN 325 MG/1
975 TABLET ORAL EVERY 8 HOURS
Status: DISCONTINUED | OUTPATIENT
Start: 2022-07-15 | End: 2022-07-16 | Stop reason: HOSPADM

## 2022-07-15 RX ORDER — MORPHINE SULFATE 2 MG/ML
1 INJECTION, SOLUTION INTRAMUSCULAR; INTRAVENOUS
Status: DISCONTINUED | OUTPATIENT
Start: 2022-07-15 | End: 2022-07-16 | Stop reason: HOSPADM

## 2022-07-15 RX ORDER — BISACODYL 10 MG
10 SUPPOSITORY, RECTAL RECTAL DAILY PRN
Status: DISCONTINUED | OUTPATIENT
Start: 2022-07-15 | End: 2022-07-16 | Stop reason: HOSPADM

## 2022-07-15 RX ORDER — LABETALOL HYDROCHLORIDE 5 MG/ML
10 INJECTION, SOLUTION INTRAVENOUS
Status: DISCONTINUED | OUTPATIENT
Start: 2022-07-15 | End: 2022-07-15 | Stop reason: HOSPADM

## 2022-07-15 RX ORDER — CEFAZOLIN SODIUM/WATER 2 G/20 ML
2 SYRINGE (ML) INTRAVENOUS
Status: COMPLETED | OUTPATIENT
Start: 2022-07-15 | End: 2022-07-15

## 2022-07-15 RX ORDER — LIDOCAINE 40 MG/G
CREAM TOPICAL
Status: DISCONTINUED | OUTPATIENT
Start: 2022-07-15 | End: 2022-07-15 | Stop reason: HOSPADM

## 2022-07-15 RX ORDER — LIDOCAINE 40 MG/G
CREAM TOPICAL
Status: DISCONTINUED | OUTPATIENT
Start: 2022-07-15 | End: 2022-07-16 | Stop reason: HOSPADM

## 2022-07-15 RX ORDER — OXYCODONE HYDROCHLORIDE 10 MG/1
10 TABLET ORAL EVERY 4 HOURS PRN
Status: DISCONTINUED | OUTPATIENT
Start: 2022-07-15 | End: 2022-07-16 | Stop reason: HOSPADM

## 2022-07-15 RX ORDER — ONDANSETRON 4 MG/1
4 TABLET, ORALLY DISINTEGRATING ORAL EVERY 6 HOURS PRN
Status: DISCONTINUED | OUTPATIENT
Start: 2022-07-15 | End: 2022-07-16 | Stop reason: HOSPADM

## 2022-07-15 RX ORDER — MORPHINE SULFATE 2 MG/ML
2 INJECTION, SOLUTION INTRAMUSCULAR; INTRAVENOUS
Status: DISCONTINUED | OUTPATIENT
Start: 2022-07-15 | End: 2022-07-16 | Stop reason: HOSPADM

## 2022-07-15 RX ORDER — ONDANSETRON 2 MG/ML
4 INJECTION INTRAMUSCULAR; INTRAVENOUS EVERY 30 MIN PRN
Status: DISCONTINUED | OUTPATIENT
Start: 2022-07-15 | End: 2022-07-15 | Stop reason: HOSPADM

## 2022-07-15 RX ORDER — SODIUM CHLORIDE, SODIUM LACTATE, POTASSIUM CHLORIDE, CALCIUM CHLORIDE 600; 310; 30; 20 MG/100ML; MG/100ML; MG/100ML; MG/100ML
INJECTION, SOLUTION INTRAVENOUS CONTINUOUS PRN
Status: DISCONTINUED | OUTPATIENT
Start: 2022-07-15 | End: 2022-07-15

## 2022-07-15 RX ORDER — CEFAZOLIN SODIUM/WATER 2 G/20 ML
2 SYRINGE (ML) INTRAVENOUS SEE ADMIN INSTRUCTIONS
Status: DISCONTINUED | OUTPATIENT
Start: 2022-07-15 | End: 2022-07-15 | Stop reason: HOSPADM

## 2022-07-15 RX ORDER — AMOXICILLIN 250 MG
1 CAPSULE ORAL 2 TIMES DAILY
Status: DISCONTINUED | OUTPATIENT
Start: 2022-07-15 | End: 2022-07-16 | Stop reason: HOSPADM

## 2022-07-15 RX ORDER — OXYCODONE HYDROCHLORIDE 5 MG/1
5 TABLET ORAL EVERY 4 HOURS PRN
Status: DISCONTINUED | OUTPATIENT
Start: 2022-07-15 | End: 2022-07-15 | Stop reason: HOSPADM

## 2022-07-15 RX ORDER — PHENYLEPHRINE HYDROCHLORIDE 10 MG/ML
INJECTION INTRAVENOUS PRN
Status: DISCONTINUED | OUTPATIENT
Start: 2022-07-15 | End: 2022-07-15

## 2022-07-15 RX ADMIN — MIDAZOLAM 2 MG: 1 INJECTION INTRAMUSCULAR; INTRAVENOUS at 12:08

## 2022-07-15 RX ADMIN — FENTANYL CITRATE 25 MCG: 0.05 INJECTION, SOLUTION INTRAMUSCULAR; INTRAVENOUS at 13:46

## 2022-07-15 RX ADMIN — GLYCOPYRROLATE 0.6 MG: 0.2 INJECTION, SOLUTION INTRAMUSCULAR; INTRAVENOUS at 13:00

## 2022-07-15 RX ADMIN — FENTANYL CITRATE 25 MCG: 0.05 INJECTION, SOLUTION INTRAMUSCULAR; INTRAVENOUS at 14:08

## 2022-07-15 RX ADMIN — NYSTATIN 500000 UNITS: 100000 SUSPENSION ORAL at 08:56

## 2022-07-15 RX ADMIN — PHENYLEPHRINE HYDROCHLORIDE 100 MCG: 10 INJECTION INTRAVENOUS at 12:31

## 2022-07-15 RX ADMIN — NEOSTIGMINE METHYLSULFATE 4 MG: 1 INJECTION, SOLUTION INTRAVENOUS at 13:00

## 2022-07-15 RX ADMIN — HYDROMORPHONE HYDROCHLORIDE 0.5 MG: 1 INJECTION, SOLUTION INTRAMUSCULAR; INTRAVENOUS; SUBCUTANEOUS at 02:33

## 2022-07-15 RX ADMIN — DEXTROSE AND SODIUM CHLORIDE: 5; 900 INJECTION, SOLUTION INTRAVENOUS at 02:37

## 2022-07-15 RX ADMIN — NYSTATIN 500000 UNITS: 100000 SUSPENSION ORAL at 20:26

## 2022-07-15 RX ADMIN — FENTANYL CITRATE 100 MCG: 50 INJECTION, SOLUTION INTRAMUSCULAR; INTRAVENOUS at 12:15

## 2022-07-15 RX ADMIN — SODIUM CHLORIDE, POTASSIUM CHLORIDE, SODIUM LACTATE AND CALCIUM CHLORIDE: 600; 310; 30; 20 INJECTION, SOLUTION INTRAVENOUS at 12:02

## 2022-07-15 RX ADMIN — PHENYLEPHRINE HYDROCHLORIDE 100 MCG: 10 INJECTION INTRAVENOUS at 12:39

## 2022-07-15 RX ADMIN — FENTANYL CITRATE 25 MCG: 0.05 INJECTION, SOLUTION INTRAMUSCULAR; INTRAVENOUS at 14:00

## 2022-07-15 RX ADMIN — PHENYLEPHRINE HYDROCHLORIDE 100 MCG: 10 INJECTION INTRAVENOUS at 12:26

## 2022-07-15 RX ADMIN — PHENYLEPHRINE HYDROCHLORIDE 100 MCG: 10 INJECTION INTRAVENOUS at 12:21

## 2022-07-15 RX ADMIN — ONDANSETRON HYDROCHLORIDE 4 MG: 2 INJECTION, SOLUTION INTRAVENOUS at 12:41

## 2022-07-15 RX ADMIN — Medication 2 G: at 12:11

## 2022-07-15 RX ADMIN — NYSTATIN 500000 UNITS: 100000 SUSPENSION ORAL at 16:35

## 2022-07-15 RX ADMIN — OXYCODONE HYDROCHLORIDE 5 MG: 5 TABLET ORAL at 18:57

## 2022-07-15 RX ADMIN — PHENYLEPHRINE HYDROCHLORIDE 100 MCG: 10 INJECTION INTRAVENOUS at 12:36

## 2022-07-15 RX ADMIN — SODIUM CHLORIDE, POTASSIUM CHLORIDE, SODIUM LACTATE AND CALCIUM CHLORIDE: 600; 310; 30; 20 INJECTION, SOLUTION INTRAVENOUS at 12:42

## 2022-07-15 RX ADMIN — ACETAMINOPHEN 975 MG: 325 TABLET, FILM COATED ORAL at 16:40

## 2022-07-15 RX ADMIN — PHENYLEPHRINE HYDROCHLORIDE 100 MCG: 10 INJECTION INTRAVENOUS at 12:24

## 2022-07-15 RX ADMIN — FENTANYL CITRATE 25 MCG: 0.05 INJECTION, SOLUTION INTRAMUSCULAR; INTRAVENOUS at 13:51

## 2022-07-15 RX ADMIN — CLINDAMYCIN PHOSPHATE 600 MG: 600 INJECTION, SOLUTION INTRAVENOUS at 16:48

## 2022-07-15 RX ADMIN — HYDROMORPHONE HYDROCHLORIDE 0.5 MG: 1 INJECTION, SOLUTION INTRAMUSCULAR; INTRAVENOUS; SUBCUTANEOUS at 06:26

## 2022-07-15 RX ADMIN — PHENYLEPHRINE HYDROCHLORIDE 100 MCG: 10 INJECTION INTRAVENOUS at 12:18

## 2022-07-15 RX ADMIN — GLYCOPYRROLATE 0.2 MG: 0.2 INJECTION, SOLUTION INTRAMUSCULAR; INTRAVENOUS at 12:16

## 2022-07-15 RX ADMIN — ROCURONIUM BROMIDE 30 MG: 50 INJECTION, SOLUTION INTRAVENOUS at 12:18

## 2022-07-15 RX ADMIN — LIDOCAINE HYDROCHLORIDE 30 MG: 10 INJECTION, SOLUTION INFILTRATION; PERINEURAL at 12:16

## 2022-07-15 RX ADMIN — PHENYLEPHRINE HYDROCHLORIDE 100 MCG: 10 INJECTION INTRAVENOUS at 12:33

## 2022-07-15 RX ADMIN — CLINDAMYCIN PHOSPHATE 600 MG: 600 INJECTION, SOLUTION INTRAVENOUS at 09:02

## 2022-07-15 RX ADMIN — NICOTINE 1 PATCH: 14 PATCH, EXTENDED RELEASE TRANSDERMAL at 08:56

## 2022-07-15 RX ADMIN — PHENYLEPHRINE HYDROCHLORIDE 100 MCG: 10 INJECTION INTRAVENOUS at 12:29

## 2022-07-15 RX ADMIN — HYDROMORPHONE HYDROCHLORIDE 0.5 MG: 1 INJECTION, SOLUTION INTRAMUSCULAR; INTRAVENOUS; SUBCUTANEOUS at 08:56

## 2022-07-15 RX ADMIN — DEXAMETHASONE SODIUM PHOSPHATE 10 MG: 4 INJECTION, SOLUTION INTRA-ARTICULAR; INTRALESIONAL; INTRAMUSCULAR; INTRAVENOUS; SOFT TISSUE at 12:16

## 2022-07-15 RX ADMIN — ROCURONIUM BROMIDE 10 MG: 50 INJECTION, SOLUTION INTRAVENOUS at 12:31

## 2022-07-15 RX ADMIN — HYDROMORPHONE HYDROCHLORIDE 0.5 MG: 1 INJECTION, SOLUTION INTRAMUSCULAR; INTRAVENOUS; SUBCUTANEOUS at 15:28

## 2022-07-15 RX ADMIN — PHENYLEPHRINE HYDROCHLORIDE 100 MCG: 10 INJECTION INTRAVENOUS at 12:41

## 2022-07-15 RX ADMIN — Medication 1 LOZENGE: at 02:37

## 2022-07-15 RX ADMIN — DEXTROSE AND SODIUM CHLORIDE: 5; 900 INJECTION, SOLUTION INTRAVENOUS at 19:04

## 2022-07-15 RX ADMIN — PROPOFOL 100 MG: 10 INJECTION, EMULSION INTRAVENOUS at 12:16

## 2022-07-15 ASSESSMENT — ACTIVITIES OF DAILY LIVING (ADL)
ADLS_ACUITY_SCORE: 28
ADLS_ACUITY_SCORE: 24
ADLS_ACUITY_SCORE: 24
ADLS_ACUITY_SCORE: 28
ADLS_ACUITY_SCORE: 24
ADLS_ACUITY_SCORE: 28
ADLS_ACUITY_SCORE: 28
ADLS_ACUITY_SCORE: 24
ADLS_ACUITY_SCORE: 28
ADLS_ACUITY_SCORE: 24

## 2022-07-15 ASSESSMENT — LIFESTYLE VARIABLES: TOBACCO_USE: 1

## 2022-07-15 ASSESSMENT — ENCOUNTER SYMPTOMS: DYSRHYTHMIAS: 0

## 2022-07-15 NOTE — PLAN OF CARE
[id number] RN    A&O.  VSS but BP soft.  0.5 IV dilaudid and PO oxycodone for pain.  Independent in room.  Voiding.  NPO since midnight in prep for OR procedure today at 1100.  Cleocin Q8 per POC.  Surgical bath done.

## 2022-07-15 NOTE — UTILIZATION REVIEW
Admission Status; Secondary Review Determination       Under the authority of the Utilization Management Committee, the utilization review process indicated a secondary review on the above patient. The review outcome is based on review of the medical records, discussions with staff, and applying clinical experience noted on the date of the review.     (x) Inpatient Status Appropriate - This patient's medical care is consistent with medical management for inpatient care and reasonable inpatient medical practice.     RATIONALE FOR DETERMINATION   1 year old male with PMH including HTN and tobacco dependence who presented to [facility] ER for ongoing issues with sore throat.  He reports that 3 weeks ago he developed intractable vomiting without abdominal pain.  Roughly 2 weeks ago he developed a sore throat that has progressed.  He also reports pain along the inside of his left lower teeth along the gumline.  The pain is now severe and he cannot tolerate very little p.o.  Patient requires inpatient admission versus short stay observation or outpatient treatment for the following reasons: Inability to have enough oral intake resulting in acute kidney injury, oropharyngeal mass concerning for malignancy in a patient with a smoker requiring further work-up and biopsy already required more than 2 midnights of hospital care at the time of this review      The expected length of stay at the time of admission was more than 2 nights because of the severity of illness, intensity of service provided, and risk for adverse outcome. Inpatient admission is appropriate.         This document was produced using voice recognition software       The information on this document is developed by the utilization review team in order for the business office to ensure compliance. This only denotes the appropriateness of proper admission status and does not reflect the quality of care rendered.   The definitions of Inpatient Status and  Observation Status used in making the determination above are those provided in the CMS Coverage Manual, Chapter 1 and Chapter 6, section 70.4.   Sincerely,   GREALD CHANEL MD   System Medical Director   Utilization Management   [facility].

## 2022-07-15 NOTE — ANESTHESIA POSTPROCEDURE EVALUATION
Patient: Iain Fuentes    Procedure: Procedure(s):  exam under anesthesia, biopsy of oropharynx       Anesthesia Type:  General    Note:  Disposition: Inpatient   Postop Pain Control: Uneventful            Sign Out: Well controlled pain   PONV: No   Neuro/Psych: Uneventful            Sign Out: Acceptable/Baseline neuro status   Airway/Respiratory: Uneventful            Sign Out: Acceptable/Baseline resp. status   CV/Hemodynamics: Uneventful            Sign Out: Acceptable CV status; No obvious hypovolemia; No obvious fluid overload   Other NRE: NONE   DID A NON-ROUTINE EVENT OCCUR? No           Last vitals:  Vitals Value Taken Time   /74 07/15/22 1420   Temp 97.3  F (36.3  C) 07/15/22 1415   Pulse 60 07/15/22 1425   Resp 5 07/15/22 1425   SpO2 98 % 07/15/22 1426   Vitals shown include unvalidated device data.    Electronically Signed By: Pola Coronado MD  July 15, 2022  4:19 PM

## 2022-07-15 NOTE — PLAN OF CARE
Pt sent to pre-op at approx 1030. Iv sl. Pain controlled with 0.5mg dilaudid. Abx given as ordered. Pt npo for surgery. Pt voiding with out problems.      1445: pt returned from surgery. Vss. Pain rating 6/10, pain meds available if pt continues once settled in bed. Pt voided in toilet.  Plan of Care Reviewed With: patient

## 2022-07-15 NOTE — PROCEDURES
OpNote    PreOp Dx:  Pharyngeal greg    PostOp Dx:  same    Surgical Procedure:  DL with biopsies  Exam under anesthesia    Surgeon:  JASON Kate      No assistant    Anesthesia:  General-ET    Findings:  Large posterior pharyngeal friable mass, midline with more involvement to the L ,  4 cm wide x 6 cm vertical, tonsils not involved  FS cw SCCA       EBL:  gtts    No complications.    Posterior pharyngeal tissue sent to pathology

## 2022-07-15 NOTE — OP NOTE
Procedure Date: 07/15/2022    PREOPERATIVE DIAGNOSIS:  Pharyngeal mass.    POSTOPERATIVE DIAGNOSIS:  Pharyngeal mass.    SURGICAL PROCEDURES:    1.  Direct laryngoscopy.  2.  Evaluation under anesthesia with biopsies.    SURGEON:  Austin Kate MD    ANESTHESIA:  General endotracheal.    INDICATION:  Large pharyngeal mass, 4 cm wide x 6 cm vertical in the midline posterior pharynx, worse on the left.  This is friable and bled readily. It did not involve the palate, tongue base, or tonsils.  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    INDICATION:  Mr. Fuentes is a 51-year-old male with a long smoking history who has had progressive throat soreness and dysphagia and was found to have a pharyngeal mass on exam.  He was admitted to the hospital for further workup.  Due to the location of this lesion and the likelihood of bleeding, exam and a direct laryngoscopy under anesthesia was recommended.  The risks, alternatives and benefits were reviewed by Dr. Laughlin with the patient on admission and I again reviewed with the patient in the preoperative area.  The risks discussed included the risks of infection, anesthesia, hemorrhage, cardiovascular event, respiratory failure, death, chipped teeth, the possibility he may require future biopsies.  He understands this is a diagnostic procedure and not curative.    DESCRIPTION OF PROCEDURE:  After meeting with the patient in the preoperative area, he was taken to the operating room and placed on the operating table in supine position.  Once adequate general endotracheal anesthesia was achieved, eye protection was placed and the table was turned 90 degrees.  The patient was then draped in the usual fashion and the universal timeout procedure was performed.  All were in agreement.  A dental guard was placed and a Dedo laryngoscope was moistened and carefully inserted into the oral cavity.  This passed into the hypopharynx and piriform sinuses.  These areas were  examined carefully and I did not identify any mucosal lesions in either piriform sinus, hypopharynx, larynx or epiglottis.  The mass seems to be centered on the posterior pharynx.  The laryngoscope was removed and a McIvor oral retractor was then inserted.  The oral cavity was then exposed and the mass carefully examined.  This was a large, fungating mass in the posterior pharynx that measured 4 cm wide and 6 cm in the vertical dimension.  There was necrosis in the middle of the mass and minor oozing.  On palpation, this felt firm and was not mobile.  This did not involve the soft palate or tonsil. It  extended up into the inferior portion of the nasopharynx and extended down towards the level of the tongue base.  Several biopsies were taken just to the left of midline and sent for frozen section.  Direct pressure was applied followed by suction electrocautery set at 15.  This seemed to control most of bleeding, although there was a continuous minor ooze.  A tonsil sponge soaked in adrenaline 1:1000 was placed for another several minutes and upon removal, there was no significant bleeding.    He tolerated the procedure well with no significant blood loss and no apparent complications.    Austin Kate MD        D: 07/15/2022   T: 07/15/2022   MT: [id number]    Name:     LOIDA BRAVO  MRN:      [id number]        Account:        [id number]   :      1970           Procedure Date: 07/15/2022     Document: [id number]

## 2022-07-15 NOTE — PLAN OF CARE
Goal Outcome Evaluation:    Plan of Care Reviewed With: patient     7a-7p RN  VS monitored, IV Dilaudid for pain, discomfort when swallowing but able to epifanio medication/secretions, up indep, voiding, bm today per pt report, IV Cleocin cont, plan for OR tomorrow at 1100, will cont to monitor.

## 2022-07-15 NOTE — ANESTHESIA CARE TRANSFER NOTE
Patient: Iain Fuentes    Procedure: Procedure(s):  exam under anesthesia, biopsy of oropharynx       Diagnosis: Oral mass [K13.79]  Diagnosis Additional Information: No value filed.    Anesthesia Type:   General     Note:    Oropharynx: oropharynx clear of all foreign objects  Level of Consciousness: drowsy  Oxygen Supplementation: face mask    Independent Airway: airway patency satisfactory and stable  Dentition: dentition unchanged  Vital Signs Stable: post-procedure vital signs reviewed and stable  Report to RN Given: handoff report given  Patient transferred to: PACU    Handoff Report: Identifed the Patient, Identified the Reponsible Provider, Reviewed the pertinent medical history, Discussed the surgical course, Reviewed Intra-OP anesthesia mangement and issues during anesthesia, Set expectations for post-procedure period and Allowed opportunity for questions and acknowledgement of understanding      Vitals:  Vitals Value Taken Time   /67 07/15/22 1311   Temp     Pulse 76 07/15/22 1315   Resp 8 07/15/22 1315   SpO2 100 % 07/15/22 1315   Vitals shown include unvalidated device data.    Electronically Signed By: PARIS Phan CRNA  July 15, 2022  1:15 PM

## 2022-07-15 NOTE — ANESTHESIA PROCEDURE NOTES
Airway       Patient location during procedure: OR       Procedure Start/Stop Times: 7/15/2022 12:18 PM  Staff -        CRNA: Jessa Solorio APRN CRNA       Performed By: CRNA  Consent for Airway        Urgency: elective  Indications and Patient Condition       Indications for airway management: vangie-procedural       Induction type:intravenous       Mask difficulty assessment: 1 - vent by mask    Final Airway Details       Final airway type: endotracheal airway       Successful airway: ETT - single and NOEMI  Endotracheal Airway Details        ETT size (mm): 7.0       Cuffed: yes       Successful intubation technique: direct laryngoscopy       DL Blade Type: Ang 2       Grade View of Cords: 1       Adjucts: stylet       Position: Center       Measured from: gums/teeth       Secured at (cm): 21       Bite block used: None    Post intubation assessment        Placement verified by: capnometry, equal breath sounds and chest rise        Number of attempts at approach: 1       Number of other approaches attempted: 0       Secured with: plastic tape       Ease of procedure: easy       Dentition: Intact and Unchanged    Medication(s) Administered   Medication Administration Time: 7/15/2022 12:18 PM

## 2022-07-15 NOTE — ANESTHESIA PREPROCEDURE EVALUATION
Anesthesia Pre-Procedure Evaluation    Patient: Iain Fuentes   MRN: [id number] : 1970        Procedure : Procedure(s):  exam under anesthesia, biopsy of oropharynx          Past Medical History:   Diagnosis Date     Alcohol abuse      Hypertension      Lumbar herniated disc      Marijuana dependence (H)       Past Surgical History:   Procedure Laterality Date     ARTHROSCOPY KNEE WITH MENISCUS ALLOGRAFT Left      ZZC NONSPECIFIC PROCEDURE      facial surgery/repair after trauma      No Known Allergies   Social History     Tobacco Use     Smoking status: Current Every Day Smoker     Packs/day: 1.00     Years: 20.00     Pack years: 20.00     Types: Cigarettes     Start date: 12/15/1985     Smokeless tobacco: Never Used   Substance Use Topics     Alcohol use: Yes      Wt Readings from Last 1 Encounters:   22 53.2 kg (117 lb 3.2 oz)        Anesthesia Evaluation            ROS/MED HX  ENT/Pulmonary:     (+) tobacco use,     Neurologic:       Cardiovascular:     (+) hypertension----- (-) CAD, CHF, arrhythmias, pulmonary hypertension and dyslipidemia   METS/Exercise Tolerance:     Hematologic:  - neg hematologic  ROS     Musculoskeletal:       GI/Hepatic:    (-) GERD   Renal/Genitourinary:     (+) renal disease, type: ARF, Pt does not require dialysis,     Endo:  - neg endo ROS     Psychiatric/Substance Use:     (+) alcohol abuse Recreational drug usage: Cannabis.    Infectious Disease:  - neg infectious disease ROS     Malignancy:  - neg malignancy ROS     Other:      (+) , H/O Chronic Pain,        Physical Exam    Airway        Mallampati: II   TM distance: > 3 FB   Neck ROM: full   Mouth opening: > 3 cm    Respiratory Devices and Support         Dental           Cardiovascular   cardiovascular exam normal          Pulmonary   pulmonary exam normal            Other findings: Lab Test        22                       0737          1740          WBC          6.1          9.2            HGB          11.7*        13.8          MCV          98           95            PLT          348          414            Lab Test        07/15/22     07/14/22     07/13/22                       0741          0737          1740          NA           138          135          136           POTASSIUM    3.7          3.6          4.4           CHLORIDE     107          101          95*           CO2          27           27           25            BUN          14           32*          38*           CR           1.06         1.51*        2.43*         ANIONGAP     4            7            16            TOSHA          8.4*         9.0          10.2          GLC          112*         122*         65*             OUTSIDE LABS:  CBC:   Lab Results   Component Value Date    WBC 6.1 07/14/2022    WBC 9.2 07/13/2022    HGB 11.7 (L) 07/14/2022    HGB 13.8 07/13/2022    HCT 35.5 (L) 07/14/2022    HCT 40.5 07/13/2022     07/14/2022     07/13/2022     BMP:   Lab Results   Component Value Date     07/15/2022     07/14/2022    POTASSIUM 3.7 07/15/2022    POTASSIUM 3.6 07/14/2022    CHLORIDE 107 07/15/2022    CHLORIDE 101 07/14/2022    CO2 27 07/15/2022    CO2 27 07/14/2022    BUN 14 07/15/2022    BUN 32 (H) 07/14/2022    CR 1.06 07/15/2022    CR 1.51 (H) 07/14/2022     (H) 07/15/2022     (H) 07/14/2022     COAGS: No results found for: PTT, INR, FIBR  POC: No results found for: BGM, HCG, HCGS  HEPATIC:   Lab Results   Component Value Date    ALBUMIN 5.1 (H) 06/19/2007    PROTTOTAL 8.1 06/19/2007    ALT 41 06/19/2007    AST 47 06/19/2007    ALKPHOS 66 06/19/2007    BILITOTAL 0.6 06/19/2007     OTHER:   Lab Results   Component Value Date    TOSHA 8.4 (L) 07/15/2022       Anesthesia Plan    ASA Status:  3      Anesthesia Type: General.     - Airway: ETT   Induction: Propofol.   Maintenance: Balanced.   Techniques and Equipment:     - Airway: Video-Laryngoscope         Consents    Anesthesia  Plan(s) and associated risks, benefits, and realistic alternatives discussed. Questions answered and patient/representative(s) expressed understanding.    - Discussed:     - Discussed with:  Patient      - Extended Intubation/Ventilatory Support Discussed: No.      - Patient is DNR/DNI Status: No    Use of blood products discussed: No .     Postoperative Care    Pain management: IV analgesics, Oral pain medications.   PONV prophylaxis: Ondansetron (or other 5HT-3), Background Propofol Infusion     Comments:                Pola Coronado MD

## 2022-07-15 NOTE — PROGRESS NOTES
Pt underwent DL with bx with frozen section showing a 4x6 cm posterior pharyngeal SCCA. Clinically he has  A T3N1M0 SCCA stage at this time. He will require an MRI to r/o prevertebral fascial involvement and PET CT to look for distant mets. If he is able to drink and eat a soft diet his w/u could be completed as an outpatient. He will also require Rad onc and Med onc consults.     --F/u in ENT clinic next week. [phone] with Dr. Madison or Dr. Aceves.

## 2022-07-15 NOTE — PROGRESS NOTES
[facility]    Hospitalist Progress Note    Date of Service (when I saw the patient): 07/15/2022  Provider:  Edmar La MD   Text Page  7am - 6PM       Assessment & Plan   Iain Fuentes is a 51 year old male who was admitted on 7/14/2022 with difficulty swallowing, significant body weight loss, and presence of pharyngeal mass suspicious for malignancy.  The patient was taken to the operating room and a biopsy was performed, preliminary findings are compatible with a squamous cell carcinoma.  Likely staging is going to be advanced once all the investigations are complete.  After arrival from PACU the patient has been stable.  He is not bleeding, his level of pain is tolerable.  He is using a capnography surveillance.    Active Problems:  Oropharyngeal mass compatible with malignancy.  Biopsy taken and sent for pathology studies.  Advance diet as prescribed by ENT.  Pain control as needed.  Watch for any bleeding at surgical site.    Essential hypertension.  On amlodipine as prior to admission, lisinopril and hydrochlorothiazide on hold.    Acute kidney injury, prerenal due to poor oral intake.  Resolved after hydration and holding medications.    Episode of hypoglycemia.  Suspect from poor oral intake, stable after admission.    Tobacco dependence.  On a nicotine patch.       DVT Prophylaxis: Pneumatic Compression Devices  Code Status: Full Code    Disposition: Expected discharge in 1 - 2 days if no complication arise.    Interval History   Patient denies shortness of breath, no local bleeding.  He is not in pain at the moment, he has tried some sips of clear liquid.  No other issues.  Capnography parameters within the normal limit.    -Data reviewed today: I reviewed all new labs and imaging results over the last 24 hours.    Physical Exam   Temp: 97.3  F (36.3  C) Temp src: Temporal BP: 113/75 Pulse: 67   Resp: 8 SpO2: 99 % O2 Device: None (Room air) Oxygen Delivery: 5 LPM  Vitals:     07/14/22 0250   Weight: 53.2 kg (117 lb 3.2 oz)     Vital Signs with Ranges  Temp:  [96.8  F (36  C)-97.4  F (36.3  C)] 97.3  F (36.3  C)  Pulse:  [56-80] 67  Resp:  [0-39] 8  BP: ()/(64-84) 113/75  SpO2:  [95 %-100 %] 99 %  I/O last 3 completed shifts:  In: 4238.33 [P.O.:240; I.V.:3998.33]  Out: -     GEN:  Alert, oriented x 3, appears comfortable, NAD.  HEENT:  Normocephalic/atraumatic, no scleral icterus, no nasal discharge, mouth moist.  CV:  Regular rate and rhythm, no murmur or JVD.  S1 + S2 noted, no S3 or S4.  LUNGS:  Clear to auscultation bilaterally without rales/rhonchi/wheezing/retractions.  Symmetric chest rise on inhalation noted.  ABD:  Active bowel sounds, soft, non-tender/non-distended.  No rebound/guarding/rigidity.  EXT:  No edema or cyanosis.  No joint synovitis noted.  SKIN:  Dry to touch, no exanthems noted in the visualized areas.       Medications     dextrose 5% and 0.9% NaCl 100 mL/hr at 07/15/22 0237       acetaminophen  975 mg Oral Q8H     amLODIPine  5 mg Oral Daily     clindamycin  600 mg Intravenous Q8H     multivitamins w/minerals  15 mL Oral Daily     nicotine  1 patch Transdermal Daily     nicotine   Transdermal Q8H     nystatin  500,000 Units Swish & Swallow 4x Daily     pantoprazole  40 mg Oral QAM AC     [START ON 7/16/2022] polyethylene glycol  17 g Oral Daily     senna-docusate  1 tablet Oral BID     sodium chloride (PF)  3 mL Intracatheter Q8H     sodium chloride (PF)  3 mL Intracatheter Q8H       Data   Recent Labs   Lab 07/15/22  0741 07/14/22  0737 07/13/22  1740   WBC  --  6.1 9.2   HGB  --  11.7* 13.8   MCV  --  98 95   PLT  --  348 414    135 136   POTASSIUM 3.7 3.6 4.4   CHLORIDE 107 101 95*   CO2 27 27 25   BUN 14 32* 38*   CR 1.06 1.51* 2.43*   ANIONGAP 4 7 16   TOSHA 8.4* 9.0 10.2   * 122* 65*       No results found for this or any previous visit (from the past 24 hour(s)).      Securely message with the Vocera Web Console (learn more here)  Text  page via [facility] Paging/Directory        Disclaimer: This note consists of symbols derived from keyboarding, dictation and/or voice recognition software. As a result, there may be errors in the script that have gone undetected. Please consider this when interpreting information found in this chart.

## 2022-07-16 VITALS
TEMPERATURE: 98.6 F | HEART RATE: 84 BPM | WEIGHT: 117.2 LBS | OXYGEN SATURATION: 99 % | DIASTOLIC BLOOD PRESSURE: 76 MMHG | SYSTOLIC BLOOD PRESSURE: 120 MMHG | BODY MASS INDEX: 17.82 KG/M2 | RESPIRATION RATE: 16 BRPM

## 2022-07-16 LAB
BASOPHILS # BLD AUTO: 0 10E3/UL (ref 0–0.2)
BASOPHILS NFR BLD AUTO: 0 %
EOSINOPHIL # BLD AUTO: 0 10E3/UL (ref 0–0.7)
EOSINOPHIL NFR BLD AUTO: 0 %
ERYTHROCYTE [DISTWIDTH] IN BLOOD BY AUTOMATED COUNT: 11.9 % (ref 10–15)
GLUCOSE BLDC GLUCOMTR-MCNC: 108 MG/DL (ref 70–99)
HCT VFR BLD AUTO: 33.3 % (ref 40–53)
HGB BLD-MCNC: 10.9 G/DL (ref 13.3–17.7)
HOLD SPECIMEN: NORMAL
IMM GRANULOCYTES # BLD: 0 10E3/UL
IMM GRANULOCYTES NFR BLD: 0 %
LYMPHOCYTES # BLD AUTO: 1 10E3/UL (ref 0.8–5.3)
LYMPHOCYTES NFR BLD AUTO: 14 %
MCH RBC QN AUTO: 32.6 PG (ref 26.5–33)
MCHC RBC AUTO-ENTMCNC: 32.7 G/DL (ref 31.5–36.5)
MCV RBC AUTO: 100 FL (ref 78–100)
MONOCYTES # BLD AUTO: 0.5 10E3/UL (ref 0–1.3)
MONOCYTES NFR BLD AUTO: 7 %
NEUTROPHILS # BLD AUTO: 5.5 10E3/UL (ref 1.6–8.3)
NEUTROPHILS NFR BLD AUTO: 79 %
NRBC # BLD AUTO: 0 10E3/UL
NRBC BLD AUTO-RTO: 0 /100
PLATELET # BLD AUTO: 307 10E3/UL (ref 150–450)
RBC # BLD AUTO: 3.34 10E6/UL (ref 4.4–5.9)
WBC # BLD AUTO: 7 10E3/UL (ref 4–11)

## 2022-07-16 PROCEDURE — 250N000013 HC RX MED GY IP 250 OP 250 PS 637: Performed by: INTERNAL MEDICINE

## 2022-07-16 PROCEDURE — 258N000003 HC RX IP 258 OP 636: Performed by: INTERNAL MEDICINE

## 2022-07-16 PROCEDURE — 250N000011 HC RX IP 250 OP 636: Performed by: INTERNAL MEDICINE

## 2022-07-16 PROCEDURE — 85025 COMPLETE CBC W/AUTO DIFF WBC: CPT | Performed by: HOSPITALIST

## 2022-07-16 PROCEDURE — 0CJS8ZZ INSPECTION OF LARYNX, VIA NATURAL OR ARTIFICIAL OPENING ENDOSCOPIC: ICD-10-PCS | Performed by: OTOLARYNGOLOGY

## 2022-07-16 PROCEDURE — 250N000013 HC RX MED GY IP 250 OP 250 PS 637: Performed by: OTOLARYNGOLOGY

## 2022-07-16 PROCEDURE — 0CBM7ZX EXCISION OF PHARYNX, VIA NATURAL OR ARTIFICIAL OPENING, DIAGNOSTIC: ICD-10-PCS | Performed by: OTOLARYNGOLOGY

## 2022-07-16 PROCEDURE — 36415 COLL VENOUS BLD VENIPUNCTURE: CPT | Performed by: HOSPITALIST

## 2022-07-16 PROCEDURE — 99239 HOSP IP/OBS DSCHRG MGMT >30: CPT | Performed by: HOSPITALIST

## 2022-07-16 RX ORDER — MAGNESIUM HYDROXIDE/ALUMINUM HYDROXICE/SIMETHICONE 120; 1200; 1200 MG/30ML; MG/30ML; MG/30ML
30 SUSPENSION ORAL EVERY 4 HOURS PRN
Status: DISCONTINUED | OUTPATIENT
Start: 2022-07-16 | End: 2022-07-16 | Stop reason: HOSPADM

## 2022-07-16 RX ADMIN — DEXTROSE AND SODIUM CHLORIDE: 5; 900 INJECTION, SOLUTION INTRAVENOUS at 00:15

## 2022-07-16 RX ADMIN — ACETAMINOPHEN 975 MG: 325 TABLET, FILM COATED ORAL at 09:03

## 2022-07-16 RX ADMIN — CLINDAMYCIN PHOSPHATE 600 MG: 600 INJECTION, SOLUTION INTRAVENOUS at 09:11

## 2022-07-16 RX ADMIN — NYSTATIN 500000 UNITS: 100000 SUSPENSION ORAL at 13:42

## 2022-07-16 RX ADMIN — Medication 15 ML: at 09:02

## 2022-07-16 RX ADMIN — PANTOPRAZOLE SODIUM 40 MG: 40 TABLET, DELAYED RELEASE ORAL at 09:03

## 2022-07-16 RX ADMIN — ACETAMINOPHEN 975 MG: 325 TABLET, FILM COATED ORAL at 00:37

## 2022-07-16 RX ADMIN — NYSTATIN 500000 UNITS: 100000 SUSPENSION ORAL at 09:06

## 2022-07-16 RX ADMIN — CLINDAMYCIN PHOSPHATE 600 MG: 600 INJECTION, SOLUTION INTRAVENOUS at 00:53

## 2022-07-16 RX ADMIN — NICOTINE 1 PATCH: 14 PATCH, EXTENDED RELEASE TRANSDERMAL at 09:05

## 2022-07-16 ASSESSMENT — ACTIVITIES OF DAILY LIVING (ADL)
ADLS_ACUITY_SCORE: 28

## 2022-07-16 NOTE — CARE PLAN
Pt is A&O x4. VS stable. Hypotensive. On full liquid diet. IV infusing. On RA. CMS intact. Ambulates independently in the room. Voids to the bathroom. Denies pain. Monitoring Hgb. On continuous pulsometry. Discharge plan 7/16 - 7/1, if no complications.

## 2022-07-16 NOTE — DISCHARGE SUMMARY
[facility]    Discharge Summary  Hospitalist    Date of Admission:  7/14/2022  Date of Discharge:  7/16/2022  Provider:  Edmar La MD  Date of Service (when I last saw the patient): 07/16/22    Discharge Diagnoses    Oropharyngeal mass compatible with malignancy.  .   Essential hypertension.   Acute kidney injury, prerenal.   Episode of hypoglycemia.      Tobacco dependence.    Severe protein calorie malnutrition in the context of malignancy         Other medical issues:  Past Medical History:   Diagnosis Date     Alcohol abuse      Hypertension      Lumbar herniated disc      Marijuana dependence (H)        History of Present Illness   Iain Fuentes is an 51 year old male who presented with dysphagia.  Please see the admission history and physical for full details.    Hospital Course   Iain Fuentes is a 51 year old male who was admitted on 7/14/2022 with difficulty swallowing, significant body weight loss, and presence of pharyngeal mass suspicious for malignancy.  The patient was taken to the operating room and a biopsy was performed, preliminary findings are compatible with a squamous cell carcinoma.  Likely staging is going to be advanced once all the investigations are complete.  After arrival from PACU the patient has been stable.  He is not bleeding, his level of pain is tolerable.  He is using a capnography surveillance.     Active Problems:  Oropharyngeal mass compatible with malignancy.  Biopsy taken and sent for pathology studies.  Advance diet as prescribed by ENT.  Pain control as needed.  Watch for any bleeding at surgical site.     Essential hypertension.  On amlodipine as prior to admission, lisinopril and hydrochlorothiazide on hold.     Acute kidney injury, prerenal due to poor oral intake.  Resolved after hydration and holding medications.     Episode of hypoglycemia.  Suspect from poor oral intake, stable after admission.     Tobacco dependence.  On a  nicotine patch.    # Discharge Pain Plan:    - Patient currently has NO PAIN and is not being prescribed pain medications on discharge.      Significant Results and Procedures   See below    Pending Results      Unresulted Labs Ordered in the Past 30 Days of this Admission     Date and Time Order Name Status Description    7/15/2022 12:38 PM Surgical Pathology Exam Preliminary           Code Status   Full Code       Primary Care Physician   Abdiel Kelly    GEN:  Alert, oriented x 3, appears comfortable, NAD.  HEENT:  Normocephalic/atraumatic, no scleral icterus, no nasal discharge, mouth moist.  CV:  Regular rate and rhythm, no murmur or JVD.  S1 + S2 noted, no S3 or S4.  LUNGS:  Clear to auscultation bilaterally without rales/rhonchi/wheezing/retractions.  Symmetric chest rise on inhalation noted.  ABD:  Active bowel sounds, soft, non-tender/non-distended.  No rebound/guarding/rigidity.  EXT:  No edema or cyanosis.  No joint synovitis noted.  SKIN:  Dry to touch, no exanthems noted in the visualized areas.     Discharge Disposition   Discharged to home    Consultations This Hospital Stay   ENT IP CONSULT  NUTRITION SERVICES ADULT IP CONSULT  SPEECH LANGUAGE PATH ADULT IP CONSULT    Time Spent on this Encounter   I, Edmar La MD, personally saw the patient today and spent greater than 30 minutes discharging this patient.     Discharge Orders   No discharge procedures on file.  Discharge Medications   Current Discharge Medication List      CONTINUE these medications which have NOT CHANGED    Details   amLODIPine (NORVASC) 5 MG tablet TAKE 1 TABLET BY MOUTH EVERY DAY  Qty: 90 tablet, Refills: 0    Comments: **Patient requests 90 days supply**  Associated Diagnoses: Benign essential hypertension      azithromycin (ZITHROMAX) 250 MG tablet Take 2 tablets (500 mg) by mouth daily for 1 day, THEN 1 tablet (250 mg) daily for 4 days.  Qty: 6 tablet, Refills: 0    Associated Diagnoses: Exudative pharyngitis       lisinopril-hydrochlorothiazide (ZESTORETIC) 20-12.5 MG tablet TAKE 1 TABLET BY MOUTH DAILY  Qty: 90 tablet, Refills: 0    Comments: **Patient requests 90 days supply**  Associated Diagnoses: Benign essential hypertension      magic mouthwash (ENTER INGREDIENTS IN COMMENTS) suspension Swish and spit 5-10 mLs in mouth every 6 hours as needed 30 ml of Benadryl (12.5 mg/5 ml), 60 ml Maalox, 30 ml Viscous Lidocaine  Qty: 120 mL, Refills: 0    Associated Diagnoses: Hyperkeratotic oral lesion      tadalafil (CIALIS) 20 MG tablet Take 0.5-1 tablets (10-20 mg) by mouth every 48 hours as needed for erectile dysfunction  Qty: 6 tablet, Refills: 11    Associated Diagnoses: Impotence of organic origin           Allergies   No Known Allergies  Data   Most Recent 3 CBC's:Recent Labs   Lab Test 07/16/22  0554 07/15/22  2153 07/14/22  0737 07/13/22  1740   WBC 7.0  --  6.1 9.2   HGB 10.9* 10.7* 11.7* 13.8     --  98 95     --  348 414      Most Recent 3 BMP's:  Recent Labs   Lab Test 07/16/22  0725 07/15/22  0741 07/14/22  0737 07/13/22  1740   NA  --  138 135 136   POTASSIUM  --  3.7 3.6 4.4   CHLORIDE  --  107 101 95*   CO2  --  27 27 25   BUN  --  14 32* 38*   CR  --  1.06 1.51* 2.43*   ANIONGAP  --  4 7 16   TOSHA  --  8.4* 9.0 10.2   * 112* 122* 65*     Most Recent 2 LFT's:No lab results found.  Most Recent INR's and Anticoagulation Dosing History:  Anticoagulation Dose History    There is no flowsheet data to display.       Most Recent 3 Troponin's:No lab results found.  Most Recent Cholesterol Panel:  Recent Labs   Lab Test 02/25/21  0822   CHOL 191   LDL 88   HDL 91   TRIG 61     Most Recent 6 Bacteria Isolates From Any Culture (See EPIC Reports for Culture Details):No lab results found.  Most Recent TSH, T4 and A1c Labs:No lab results found.  Results for orders placed or performed during the hospital encounter of 07/14/22   Soft tissue neck CT w contrast    Narrative    EXAM: CT SOFT TISSUE NECK W  CONTRAST  LOCATION: [facility]  DATE/TIME: 7/13/2022 6:57 PM    INDICATION: Left mandibular soft tissue mass, intraoral sublingual mass. Mouth lesions.  COMPARISON: None.  CONTRAST: isovue 370  75ml  TECHNIQUE: Routine CT Soft Tissue Neck with IV contrast. Multiplanar reformats. Dose reduction techniques were used.    FINDINGS:     MUCOSAL SPACES/SOFT TISSUES: Abnormal soft tissue prominence in the posterior aspect of the oropharynx emanates slightly more medially than typically visualized for the palatine tonsils, although could reflect asymmetric, left greater than right   tonsillar enlargement. Other posterior oropharyngeal mass lesion would be difficult to exclude. This appears somewhat bilobed, see axial image 153 of series 3. There are some dystrophic calcifications in the lateral palatine tonsillar region on the left.   These findings could reflect tonsillitis/pharyngitis, again without evidence of focal abscess. Additionally, there is mild enlargement and hyperemia that asymmetrically involves the left submandibular gland and the left sublingual gland. There appears   to be subtle, nonlocalized and somewhat amorphous fluid type attenuation which resides along the inferior and lateral aspect of the left sublingual space. No evidence of focal abscess, however.  The findings in the left submandibular and sublingual gland   could be reactive in nature and there is no evidence of glandular or ductal stone.    Normal vocal cords and infraglottic trachea. Normal parapharyngeal space and subcutaneous soft tissues. Otherwise normal oral cavity,  spaces, and floor of mouth structures.    LYMPH NODES: No pathologic lymph nodes by size or morphology criteria.     SALIVARY GLANDS: Normal right parotid gland. Both submandibular glands demonstrate mild prominence of the intraglandular ducts without focal stone.    THYROID: Normal.     VESSELS: Vascular structures of the neck are  patent.    VISUALIZED INTRACRANIAL/ORBITS/SINUSES: No abnormality of the visualized intracranial compartment or orbits. Visualized paranasal sinuses and mastoid air cells are clear.    OTHER: No osseous destructive lesion with cervical degenerative changes.      Impression    IMPRESSION:   1.  Abnormal soft tissue prominence in the posterior aspect of the oropharynx may reflect diffuse enlargement of the palatine tonsils/pharyngitis. Oropharyngeal mass lesion would be difficult to exclude and clinical evaluation with visualization of this   region recommended, as well as clinical follow-up to resolution. If the symptoms were to persist, follow-up imaging would be recommended.    2.  Mild enlargement and hyperemia asymmetrically involves the left submandibular gland and left sublingual gland, favored to be reactive.    3.  No pathologic adenopathy.         Disclaimer: This note consists of symbols derived from keyboarding, dictation and/or voice recognition software. As a result, there may be errors in the script that have gone undetected. Please consider this when interpreting information found in this chart.

## 2022-07-16 NOTE — PROVIDER NOTIFICATION
Pt is experiencing episodes of heartburn. No medication is available in his MAR. Pt states that TUMS does not work for him. Please advise. Thanks!

## 2022-07-16 NOTE — PLAN OF CARE
Goal Outcome Evaluation:    Plan of Care Reviewed With: patient      Pt alert and oriented, afebrile, VSS except for soft /70 and respirations of 10 this evening. Up independently in the room, voiding, refused senna this afternoon. Nutrition poor, tolerating PO meds. Pt smoked in the bathroom around 1900, RN had a conversation with PT, who handed his lighter and cigarettes. Pt agreed not to smoke in the hospital and his belongings are in the med cabinet in the room. Will continue to monitor.

## 2022-07-17 NOTE — PLAN OF CARE
"Speech Language Therapy Discharge Summary    Reason for therapy discharge:    Discharged to home.    Progress towards therapy goal(s). See goals on Care Plan in [facility] electronic health record for goal details.  Goals not met.  Barriers to achieving goals:   discharge from facility.    Therapy recommendation(s):    No further therapy is recommended. Dependant upon medical plan.    Patient seen for evaluation only. \"Clinical swallow evaluation completed; limited overall assessment as pt just finished up breakfast + severity of  odynophagia (rating throat pain 6/10 at rest and increases to 9/10 with swallowing). Pt assessed with thin liquids/puree solids only at this time again given odynophagia, he pleasantly declined even soft chewable trials. Oromotor exam WFL. Oral phase of swallow appears WFL with functional labial seal, oral transit, oral clearance. Some hesitancy/delay in swallow supsect related to pain. Laryngeal elevation presention, though questionably reduced to palpation. No overt clinical signs/sx aspiration noted including clear vocal quality and clear cued cough following both thin/puree items. Pt denied sticking sensation when asked. Recommend puree food options for pt comfort. Educated on possibility of VFSS for further assessment, though pt declines need at this time - can complete following other consults (e.g., ENT) if indicated.\"    *Patient not seen by this writer. Information taken from latest SLP note.                            "

## 2022-07-18 ENCOUNTER — PATIENT OUTREACH (OUTPATIENT)
Dept: CARE COORDINATION | Facility: CLINIC | Age: 52
End: 2022-07-18

## 2022-07-18 DIAGNOSIS — Z71.89 OTHER SPECIFIED COUNSELING: ICD-10-CM

## 2022-07-18 NOTE — PROGRESS NOTES
Clinic Care Coordination Contact  [facility]/Voicemail       Clinical Data: Care Coordinator Outreach  Outreach attempted x 1.  Left message on patient's voicemail with call back information and requested return call.  Plan: Care Coordinator will try to reach patient again in 1-2 business days.    Oef Arshad  Community Health Worker  [facility]  [phone]

## 2022-07-19 RX ORDER — FENTANYL CITRATE 50 UG/ML
INJECTION, SOLUTION INTRAMUSCULAR; INTRAVENOUS PRN
Status: DISCONTINUED | OUTPATIENT
Start: 2022-07-15 | End: 2022-07-19

## 2022-07-19 NOTE — PROGRESS NOTES
[facility]    Background: Care Coordination referral placed from [facility] discharge report for reason of patient meeting criteria for a TCM outreach call by [facility] team.    Assessment: Upon chart review, CCRC Team member will cancel/close the referral for TCM outreach due to reason below:    Patient has a follow up appointment with an appropriate provider today for hospital discharge    Plan: Care Coordination referral for TCM outreach canceled.      Ofe Arshad  Community Health Worker  [facility]  [phone]      *Connected Care Resource Team does NOT follow patient ongoing. Referrals are identified based on internal discharge reports and the outreach is to ensure patient has an understanding of their discharge instructions.

## 2022-08-05 NOTE — CONSULTS
Outpatient IR Referral    Patient is a 50 y/o male with a PMH of HTN, tobacco use, ETOH abuse, marijuana dependence, YAYA, 20 lb weight loss in 3 weeks, sore throat, vomiting, dysphagia who presented at [place] [facility] no beds transferred to [facility] 7/14/22. 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 IR has been asked to place a port and gastrostomy tube placement.    Patient will need dietary, patient learning center, and supply referral prior to scheduled procedure.     No imaging of abdomen in PACs, CT 5/15/22 note in care everywhere. Prior surgeries include left knee arthroscopy with allograft, non specific facial surgery repair after trauma, no obvious exclusions for port placement or gastrostomy tube placement.     Case was reviewed with Dr. Pitt from IR and port and gastrostomy tube approved, recommends procedures on different days. IR recommends patient to have contrast prior to procedure if he is able to swallow. To note if there is not a safe window to stomach percutaneous gastrostomy tube will not be placed.     Procedure orders placed.    Primary team Dr. Dreyer from [facility] made aware of IR recommendations via epic messaging.      PARIS Singleton CNP  Interventional Radiology   IR on-call pager: [phone]

## 2022-08-08 NOTE — PROGRESS NOTES
Outpatient IR  Referral    I spoke with [place], Dr.Dreyers nurse who will set patient up with in network dietary and referral to Outpatient [facility], supply referral.     PARIS Singleton CNP  Interventional Radiology   IR on-call pager: [phone]

## 2022-08-16 NOTE — TELEPHONE ENCOUNTER
Writer spoke with patient via telephone.  He acknowledges understanding of pre-procedure education instructions including COVID testing and 8/23 use of HIBICLINS prior to both his scheduled procedure on Friday August 19 as well as Tuesday August 23.      He states that he has received notice that his contrast for scheduled procedure on Friday is ready for pick-up at [facility], but he hasn't picked up yet. Writer contracts with patient that he will  contrast by Thursday and writer will call him Thursday afternoon to discuss instructions for contrast administration.     Patient also states that he does not have anyone to transport him to and from appointment.  Writer provided patient with number to [place] which is an acceptable mode of public transportation for these procedures.    Patient received contact number to the [facility] for scheduling education regarding his G-tube placement.  This writer also contacted [facility] to provide date and time of appointment and patient need for education regarding new tube placement.    Plan to contact on Thursday to f/u on contrast plans-    Ansley Raya RN on 8/16/2022 at 12:36 PM

## 2022-08-18 NOTE — TELEPHONE ENCOUNTER
Per verbal contract with patient, writer called patient at 1330 to discuss administration of oral contrast in preparation for the procedure scheduled tomorrow in IR.  Patient states that he hasn't  the contrast from the [employer] store yet.  He will do it in about an hour or two.  Writer repeats the verbal instructions for administering the contrast this evening.  I also explained that directions for use would be supplied with the contrast when he picks up package.  Patient acknowledges understanding and denies any questions at this time.      Patient states he also has purchased the HIBICLINS scrub needed for his IR procedure on Tuesday August 23.  He denies any further questions.  Patient confirms he has IR contact number if he has any needs.    Ansley Raya RN on 8/18/2022 at 1:39 PM

## 2022-08-23 NOTE — PROGRESS NOTES
PIV discontinued. Discharge paperwork reviewed with patient, escorted to lobby for medical transport home.

## 2022-08-23 NOTE — PRE-PROCEDURE
GENERAL PRE-PROCEDURE:   Procedure:  Image guided chest port placement  Date/Time:  8/23/2022 12:19 PM    Written consent obtained?: Yes    Risks and benefits: Risks, benefits and alternatives were discussed    Consent given by:  Patient  Patient states understanding of procedure being performed: Yes    Patient's understanding of procedure matches consent: Yes    Procedure consent matches procedure scheduled: Yes    Expected level of sedation:  Moderate  Appropriately NPO:  Yes  ASA Class:  3  Mallampati  :  Grade 2- soft palate, base of uvula, tonsillar pillars, and portion of posterior pharyngeal wall visible  Lungs:  Lungs clear with good breath sounds bilaterally  Heart:  Normal heart sounds and rate  History & Physical reviewed:  History and physical reviewed and no updates needed  Statement of review:  I have reviewed the lab findings, diagnostic data, medications, and the plan for sedation

## 2022-08-23 NOTE — IR NOTE
Patient Name: Iain Fuentes  Medical Record Number: [id number]  Today's Date: 8/23/2022    Procedure: Image Guided Chest Port Placement  Proceduralist: Miki Pitt    Sedation start time: 1336  Sedation end time: 1400  Sedation medications administered: 2 mg versed, 100 mcg fentanyl  Total sedation time: 24 min    Procedure start time: 1336  Procedure end time: 1400    Report given to: 2A RN  : n/a    Other Notes: Pt arrived to IR room 2 from . Consent reviewed, pt confirmed. Pt denies any questions or concerns regarding procedure. Pt positioned supine and monitored per protocol. Site cleansed and dressed per protocol. Pt tolerated procedure without any noted complications. Pt transferred back to .

## 2022-08-23 NOTE — PROGRESS NOTES
S/p Right side Chest PORT placement. Right chest and internal jugular site intact with Dermabond. VSS. Denies pain/nausea. Drinking and eating. Bedrest x 1 hour (1515).

## 2022-08-23 NOTE — PROGRESS NOTES
Prep complete for Port Placement. Awaiting lab results. Patient has medical transport set up for transportation home post procedure.

## 2022-08-23 NOTE — DISCHARGE INSTRUCTIONS
[facility]        Interventional Radiology  Discharge Instructions  Following Port Placement    Your Port has been closed with  Absorbable suture  If after 10 days there are visible suture they may be trimmed by your primary doctor  Derma Webber (Skin Glue)  Do not apply any ointments over site  Do not cover with any dressing  This thin layer will slough off in 7-10 days  May gently remove Derma Webber in 10 days if still present    If there is any oozing or bleeding from the site, apply direct pressure for 5-10 minutes with a gauze pad.  If bleeding continues after 10 minutes call your primary doctor.  If bleeding cannot be controlled with direct pressure, call 911.    Call your Doctor if:  Bleeding as above  Swelling in your neck or arm  Sudden onset of Shortness of Breath, Lightheadedness, Palpitations.  Fever greater than 100.5  F  Other signs of infection such as, redness, tenderness, or drainage from the wound    If you were given sedation:  We recommend an adult stay with you for the first 24 hours.  No driving or alcoholic beverages for 24 hours.    Discharge Booklet given    ADDITIONAL INSTRUCTIONS: May use ice packs 3-4 times a day for 15 minutes for minor swelling or pain  No heavy lifting greater than 10 lbs. for one week  No tub bath, hot tub, or swimming until Derma Webber (Skin Glue) is removed  It is OK to shower and get the incision wet but immediately pat it dry  No Emla Cream to Port site for 1 week.  If you are on Coumadin, restart tonight.  Follow up with your Coumadin Clinic or Primary Care MD to have your INR rechecked.    [facility] INTERVENTIONAL RADIOLOGY DEPARTMENT  Procedure Physician: Chrissy Livingston  Date of procedure: August 23, 2022  Telephone Numbers: [phone]      Monday-Friday 7:30 am to 4:00 pm  [phone] After 4:00 pm Monday-Friday, Weekends & Holidays.   Ask for the Interventional Radiologist on call.  Someone is on call 24 hrs/day  [facility] toll free  number: [phone] Monday-Friday 8:00 am to 4:30 pm  [facility] Emergency Dept: [phone]

## 2022-08-23 NOTE — PROCEDURES
[facility]    Procedure: IR Procedure Note    Date/Time: 8/23/2022 2:12 PM  Performed by: Chrissy Livingston PA-C  Authorized by: Chrissy Livingston PA-C   IR Fellow Physician: Dr. Momo Pitt      UNIVERSAL PROTOCOL   Site Marked: NA  Prior Images Obtained and Reviewed:  Yes  Required items: Required blood products, implants, devices and special equipment available    Patient identity confirmed:  Verbally with patient, arm band, provided demographic data and hospital-assigned identification number  Patient was reevaluated immediately before administering moderate or deep sedation or anesthesia  Confirmation Checklist:  Patient's identity using two indicators, relevant allergies, procedure was appropriate and matched the consent or emergent situation and correct equipment/implants were available  Time out: Immediately prior to the procedure a time out was called    Universal Protocol: the Joint Commission Universal Protocol was followed    Preparation: Patient was prepped and draped in usual sterile fashion       ANESTHESIA    Anesthesia: Local infiltration  Local Anesthetic:  Lidocaine 1% without epinephrine      SEDATION  Patient Sedated: Yes    Vital signs: Vital signs monitored during sedation    See dictated procedure note for full details.  Findings: Sedation start time: 1336  Sedation end time: 1400  Sedation medications administered: 2 mg versed, 100 mcg fentanyl  Total sedation time: 24 min       Specimens: none    Complications: None    Condition: Stable    Plan: Transport to  for recovery       PROCEDURE  Describe Procedure: Completed image-guided placement of 8 [place] 25 cm single lumen power-injectable central venous chest port via right internal jugular vein with tip in RA. Aspirates and flushes freely, heparin locked and is ready for immediate use.    Patient Tolerance:  Patient tolerated the procedure well with no immediate complications  Length of time  physician/provider present for 1:1 monitoring during sedation: 25

## 2023-01-01 ENCOUNTER — APPOINTMENT (OUTPATIENT)
Dept: GENERAL RADIOLOGY | Facility: CLINIC | Age: 53
End: 2023-01-01
Attending: STUDENT IN AN ORGANIZED HEALTH CARE EDUCATION/TRAINING PROGRAM
Payer: COMMERCIAL

## 2023-01-01 ENCOUNTER — APPOINTMENT (OUTPATIENT)
Dept: CT IMAGING | Facility: CLINIC | Age: 53
End: 2023-01-01
Attending: EMERGENCY MEDICINE
Payer: COMMERCIAL

## 2023-01-01 ENCOUNTER — TRANSFERRED RECORDS (OUTPATIENT)
Dept: HEALTH INFORMATION MANAGEMENT | Facility: CLINIC | Age: 53
End: 2023-01-01

## 2023-01-01 ENCOUNTER — APPOINTMENT (OUTPATIENT)
Dept: CT IMAGING | Facility: CLINIC | Age: 53
End: 2023-01-01
Payer: COMMERCIAL

## 2023-01-01 ENCOUNTER — ANCILLARY PROCEDURE (OUTPATIENT)
Dept: CT IMAGING | Facility: CLINIC | Age: 53
End: 2023-01-01
Attending: OTOLARYNGOLOGY
Payer: COMMERCIAL

## 2023-01-01 ENCOUNTER — APPOINTMENT (OUTPATIENT)
Dept: GENERAL RADIOLOGY | Facility: CLINIC | Age: 53
End: 2023-01-01
Attending: EMERGENCY MEDICINE
Payer: COMMERCIAL

## 2023-01-01 ENCOUNTER — APPOINTMENT (OUTPATIENT)
Dept: CT IMAGING | Facility: CLINIC | Age: 53
End: 2023-01-01
Attending: STUDENT IN AN ORGANIZED HEALTH CARE EDUCATION/TRAINING PROGRAM
Payer: COMMERCIAL

## 2023-01-01 ENCOUNTER — APPOINTMENT (OUTPATIENT)
Dept: MRI IMAGING | Facility: CLINIC | Age: 53
End: 2023-01-01
Attending: STUDENT IN AN ORGANIZED HEALTH CARE EDUCATION/TRAINING PROGRAM
Payer: COMMERCIAL

## 2023-01-01 ENCOUNTER — TRANSCRIBE ORDERS (OUTPATIENT)
Dept: OTHER | Age: 53
End: 2023-01-01

## 2023-01-01 ENCOUNTER — HOSPITAL ENCOUNTER (INPATIENT)
Dept: NEUROLOGY | Facility: CLINIC | Age: 53
Discharge: HOME | End: 2023-07-29
Payer: COMMERCIAL

## 2023-01-01 ENCOUNTER — HEALTH MAINTENANCE LETTER (OUTPATIENT)
Age: 53
End: 2023-01-01

## 2023-01-01 ENCOUNTER — TRANSFERRED RECORDS (OUTPATIENT)
Dept: HEALTH INFORMATION MANAGEMENT | Facility: CLINIC | Age: 53
End: 2023-01-01
Payer: COMMERCIAL

## 2023-01-01 ENCOUNTER — APPOINTMENT (OUTPATIENT)
Dept: CT IMAGING | Facility: CLINIC | Age: 53
End: 2023-01-01
Attending: PHYSICIAN ASSISTANT
Payer: COMMERCIAL

## 2023-01-01 ENCOUNTER — APPOINTMENT (OUTPATIENT)
Dept: CARDIOLOGY | Facility: CLINIC | Age: 53
End: 2023-01-01
Attending: STUDENT IN AN ORGANIZED HEALTH CARE EDUCATION/TRAINING PROGRAM
Payer: COMMERCIAL

## 2023-01-01 ENCOUNTER — ALLIED HEALTH/NURSE VISIT (OUTPATIENT)
Dept: INTERNAL MEDICINE | Facility: CLINIC | Age: 53
End: 2023-01-01
Payer: COMMERCIAL

## 2023-01-01 ENCOUNTER — APPOINTMENT (OUTPATIENT)
Dept: MRI IMAGING | Facility: CLINIC | Age: 53
End: 2023-01-01
Attending: PHYSICIAN ASSISTANT
Payer: COMMERCIAL

## 2023-01-01 ENCOUNTER — HOSPITAL ENCOUNTER (INPATIENT)
Facility: CLINIC | Age: 53
LOS: 6 days | End: 2023-08-01
Attending: EMERGENCY MEDICINE | Admitting: INTERNAL MEDICINE
Payer: COMMERCIAL

## 2023-01-01 ENCOUNTER — APPOINTMENT (OUTPATIENT)
Dept: CT IMAGING | Facility: CLINIC | Age: 53
End: 2023-01-01
Attending: HOSPITALIST
Payer: COMMERCIAL

## 2023-01-01 VITALS
DIASTOLIC BLOOD PRESSURE: 119 MMHG | HEIGHT: 66 IN | TEMPERATURE: 101.3 F | SYSTOLIC BLOOD PRESSURE: 170 MMHG | WEIGHT: 103 LBS | BODY MASS INDEX: 16.55 KG/M2 | OXYGEN SATURATION: 100 %

## 2023-01-01 VITALS — SYSTOLIC BLOOD PRESSURE: 144 MMHG | DIASTOLIC BLOOD PRESSURE: 82 MMHG

## 2023-01-01 DIAGNOSIS — D64.9 ANEMIA: ICD-10-CM

## 2023-01-01 DIAGNOSIS — I10 BENIGN ESSENTIAL HYPERTENSION: ICD-10-CM

## 2023-01-01 DIAGNOSIS — I10 ESSENTIAL HYPERTENSION, BENIGN: Primary | ICD-10-CM

## 2023-01-01 DIAGNOSIS — N17.9 ACUTE KIDNEY INJURY (H): ICD-10-CM

## 2023-01-01 DIAGNOSIS — C10.9 OROPHARYNGEAL CANCER (H): Primary | ICD-10-CM

## 2023-01-01 DIAGNOSIS — I62.00 SUBDURAL HEMORRHAGE (H): Primary | ICD-10-CM

## 2023-01-01 DIAGNOSIS — C14.0 SQUAMOUS CELL CARCINOMA OF PHARYNX (H): ICD-10-CM

## 2023-01-01 DIAGNOSIS — A41.9 SEPSIS (H): ICD-10-CM

## 2023-01-01 DIAGNOSIS — S02.91XA SKULL FRACTURE (H): ICD-10-CM

## 2023-01-01 LAB
ABO/RH(D): NORMAL
ABO/RH(D): NORMAL
ACINETOBACTER SPECIES: NOT DETECTED
ALBUMIN SERPL BCG-MCNC: 2.3 G/DL (ref 3.5–5.2)
ALBUMIN SERPL BCG-MCNC: 2.6 G/DL (ref 3.5–5.2)
ALBUMIN UR-MCNC: 50 MG/DL
ALLEN'S TEST: YES
ALP SERPL-CCNC: 135 U/L (ref 40–129)
ALP SERPL-CCNC: 241 U/L (ref 40–129)
ALT SERPL W P-5'-P-CCNC: 14 U/L (ref 0–70)
ALT SERPL W P-5'-P-CCNC: 25 U/L (ref 0–70)
ANION GAP SERPL CALCULATED.3IONS-SCNC: 12 MMOL/L (ref 7–15)
ANION GAP SERPL CALCULATED.3IONS-SCNC: 12 MMOL/L (ref 7–15)
ANION GAP SERPL CALCULATED.3IONS-SCNC: 14 MMOL/L (ref 7–15)
ANION GAP SERPL CALCULATED.3IONS-SCNC: 15 MMOL/L (ref 7–15)
ANION GAP SERPL CALCULATED.3IONS-SCNC: 15 MMOL/L (ref 7–15)
ANION GAP SERPL CALCULATED.3IONS-SCNC: 16 MMOL/L (ref 7–15)
ANTIBODY SCREEN: NEGATIVE
ANTIBODY SCREEN: NEGATIVE
APPEARANCE UR: CLEAR
AST SERPL W P-5'-P-CCNC: 20 U/L (ref 0–45)
AST SERPL W P-5'-P-CCNC: 26 U/L (ref 0–45)
ATRIAL RATE - MUSE: 113 BPM
BACTERIA #/AREA URNS HPF: ABNORMAL /HPF
BACTERIA BLD CULT: ABNORMAL
BASE EXCESS BLDA CALC-SCNC: -2.9 MMOL/L (ref -9–1.8)
BASOPHILS # BLD AUTO: 0 10E3/UL (ref 0–0.2)
BASOPHILS # BLD MANUAL: 0 10E3/UL (ref 0–0.2)
BASOPHILS NFR BLD AUTO: 0 %
BASOPHILS NFR BLD MANUAL: 0 %
BILIRUB SERPL-MCNC: 0.4 MG/DL
BILIRUB SERPL-MCNC: 0.7 MG/DL
BILIRUB UR QL STRIP: NEGATIVE
BLD PROD TYP BPU: NORMAL
BLOOD COMPONENT TYPE: NORMAL
BUN SERPL-MCNC: 10.1 MG/DL (ref 6–20)
BUN SERPL-MCNC: 14.8 MG/DL (ref 6–20)
BUN SERPL-MCNC: 20.8 MG/DL (ref 6–20)
BUN SERPL-MCNC: 32.7 MG/DL (ref 6–20)
BUN SERPL-MCNC: 39.4 MG/DL (ref 6–20)
BUN SERPL-MCNC: 44.1 MG/DL (ref 6–20)
CALCIUM SERPL-MCNC: 7.9 MG/DL (ref 8.6–10)
CALCIUM SERPL-MCNC: 8.1 MG/DL (ref 8.6–10)
CALCIUM SERPL-MCNC: 8.2 MG/DL (ref 8.6–10)
CALCIUM SERPL-MCNC: 8.4 MG/DL (ref 8.6–10)
CALCIUM SERPL-MCNC: 8.7 MG/DL (ref 8.6–10)
CALCIUM SERPL-MCNC: 8.8 MG/DL (ref 8.6–10)
CHLORIDE SERPL-SCNC: 102 MMOL/L (ref 98–107)
CHLORIDE SERPL-SCNC: 103 MMOL/L (ref 98–107)
CHLORIDE SERPL-SCNC: 107 MMOL/L (ref 98–107)
CHLORIDE SERPL-SCNC: 108 MMOL/L (ref 98–107)
CHLORIDE SERPL-SCNC: 110 MMOL/L (ref 98–107)
CHLORIDE SERPL-SCNC: 95 MMOL/L (ref 98–107)
CHOLEST SERPL-MCNC: 145 MG/DL
CITROBACTER SPECIES: NOT DETECTED
CK SERPL-CCNC: 21 U/L (ref 39–308)
CODING SYSTEM: NORMAL
COLOR UR AUTO: YELLOW
CREAT SERPL-MCNC: 0.69 MG/DL (ref 0.67–1.17)
CREAT SERPL-MCNC: 0.76 MG/DL (ref 0.67–1.17)
CREAT SERPL-MCNC: 0.94 MG/DL (ref 0.67–1.17)
CREAT SERPL-MCNC: 1.43 MG/DL (ref 0.67–1.17)
CREAT SERPL-MCNC: 2.06 MG/DL (ref 0.67–1.17)
CREAT SERPL-MCNC: 2.91 MG/DL (ref 0.67–1.17)
CROSSMATCH: NORMAL
CRP SERPL-MCNC: 174.73 MG/L
CRP SERPL-MCNC: 283.4 MG/L
CTX-M: NORMAL
DEPRECATED HCO3 PLAS-SCNC: 20 MMOL/L (ref 22–29)
DEPRECATED HCO3 PLAS-SCNC: 20 MMOL/L (ref 22–29)
DEPRECATED HCO3 PLAS-SCNC: 21 MMOL/L (ref 22–29)
DEPRECATED HCO3 PLAS-SCNC: 21 MMOL/L (ref 22–29)
DEPRECATED HCO3 PLAS-SCNC: 25 MMOL/L (ref 22–29)
DEPRECATED HCO3 PLAS-SCNC: 27 MMOL/L (ref 22–29)
DIASTOLIC BLOOD PRESSURE - MUSE: NORMAL MMHG
ENTEROBACTER SPECIES: NOT DETECTED
EOSINOPHIL # BLD AUTO: 0 10E3/UL (ref 0–0.7)
EOSINOPHIL # BLD AUTO: 0 10E3/UL (ref 0–0.7)
EOSINOPHIL # BLD AUTO: 0.1 10E3/UL (ref 0–0.7)
EOSINOPHIL # BLD AUTO: 0.1 10E3/UL (ref 0–0.7)
EOSINOPHIL # BLD MANUAL: 0.1 10E3/UL (ref 0–0.7)
EOSINOPHIL NFR BLD AUTO: 0 %
EOSINOPHIL NFR BLD AUTO: 1 %
EOSINOPHIL NFR BLD MANUAL: 1 %
ERYTHROCYTE [DISTWIDTH] IN BLOOD BY AUTOMATED COUNT: 13.9 % (ref 10–15)
ERYTHROCYTE [DISTWIDTH] IN BLOOD BY AUTOMATED COUNT: 14.2 % (ref 10–15)
ERYTHROCYTE [DISTWIDTH] IN BLOOD BY AUTOMATED COUNT: 14.7 % (ref 10–15)
ERYTHROCYTE [DISTWIDTH] IN BLOOD BY AUTOMATED COUNT: 15 % (ref 10–15)
ERYTHROCYTE [DISTWIDTH] IN BLOOD BY AUTOMATED COUNT: 15.4 % (ref 10–15)
ERYTHROCYTE [DISTWIDTH] IN BLOOD BY AUTOMATED COUNT: 15.6 % (ref 10–15)
ERYTHROCYTE [SEDIMENTATION RATE] IN BLOOD BY WESTERGREN METHOD: 96 MM/HR (ref 0–20)
ESCHERICHIA COLI: NOT DETECTED
ETHANOL SERPL-MCNC: <0.01 G/DL
FERRITIN SERPL-MCNC: 1085 NG/ML (ref 31–409)
FLUAV RNA SPEC QL NAA+PROBE: NEGATIVE
FLUBV RNA RESP QL NAA+PROBE: NEGATIVE
FOLATE SERPL-MCNC: 4 NG/ML (ref 4.6–34.8)
GFR SERPL CREATININE-BSD FRML MDRD: 25 ML/MIN/1.73M2
GFR SERPL CREATININE-BSD FRML MDRD: 38 ML/MIN/1.73M2
GFR SERPL CREATININE-BSD FRML MDRD: 59 ML/MIN/1.73M2
GFR SERPL CREATININE-BSD FRML MDRD: >90 ML/MIN/1.73M2
GLUCOSE BLDC GLUCOMTR-MCNC: 70 MG/DL (ref 70–99)
GLUCOSE BLDC GLUCOMTR-MCNC: 71 MG/DL (ref 70–99)
GLUCOSE BLDC GLUCOMTR-MCNC: 73 MG/DL (ref 70–99)
GLUCOSE BLDC GLUCOMTR-MCNC: 76 MG/DL (ref 70–99)
GLUCOSE BLDC GLUCOMTR-MCNC: 91 MG/DL (ref 70–99)
GLUCOSE SERPL-MCNC: 115 MG/DL (ref 70–99)
GLUCOSE SERPL-MCNC: 117 MG/DL (ref 70–99)
GLUCOSE SERPL-MCNC: 87 MG/DL (ref 70–99)
GLUCOSE SERPL-MCNC: 88 MG/DL (ref 70–99)
GLUCOSE SERPL-MCNC: 91 MG/DL (ref 70–99)
GLUCOSE SERPL-MCNC: 94 MG/DL (ref 70–99)
GLUCOSE UR STRIP-MCNC: NEGATIVE MG/DL
HBA1C MFR BLD: 5.6 %
HCO3 BLD-SCNC: 23 MMOL/L (ref 21–28)
HCT VFR BLD AUTO: 20.8 % (ref 40–53)
HCT VFR BLD AUTO: 22.5 % (ref 40–53)
HCT VFR BLD AUTO: 23.2 % (ref 40–53)
HCT VFR BLD AUTO: 23.2 % (ref 40–53)
HCT VFR BLD AUTO: 23.4 % (ref 40–53)
HCT VFR BLD AUTO: 24 % (ref 40–53)
HDLC SERPL-MCNC: 16 MG/DL
HGB BLD-MCNC: 6.9 G/DL (ref 13.3–17.7)
HGB BLD-MCNC: 7.6 G/DL (ref 13.3–17.7)
HGB BLD-MCNC: 7.8 G/DL (ref 13.3–17.7)
HGB BLD-MCNC: 7.8 G/DL (ref 13.3–17.7)
HGB BLD-MCNC: 8 G/DL (ref 13.3–17.7)
HGB BLD-MCNC: 8 G/DL (ref 13.3–17.7)
HGB BLD-MCNC: 8.2 G/DL (ref 13.3–17.7)
HGB UR QL STRIP: ABNORMAL
HOLD SPECIMEN: NORMAL
HOLD SPECIMEN: NORMAL
HYALINE CASTS: 1 /LPF
IMM GRANULOCYTES # BLD: 0.2 10E3/UL
IMM GRANULOCYTES # BLD: 0.2 10E3/UL
IMM GRANULOCYTES # BLD: 0.3 10E3/UL
IMM GRANULOCYTES # BLD: 0.4 10E3/UL
IMM GRANULOCYTES NFR BLD: 1 %
IMM GRANULOCYTES NFR BLD: 1 %
IMM GRANULOCYTES NFR BLD: 2 %
IMM GRANULOCYTES NFR BLD: 2 %
IMP: NORMAL
INTERPRETATION ECG - MUSE: NORMAL
IRON BINDING CAPACITY (ROCHE): 138 UG/DL (ref 240–430)
IRON SATN MFR SERPL: 10 % (ref 15–46)
IRON SERPL-MCNC: 14 UG/DL (ref 61–157)
ISSUE DATE AND TIME: NORMAL
KETONES UR STRIP-MCNC: NEGATIVE MG/DL
KLEBSIELLA OXYTOCA: NOT DETECTED
KLEBSIELLA PNEUMONIAE: NOT DETECTED
KPC: NORMAL
LACTATE SERPL-SCNC: 0.6 MMOL/L (ref 0.7–2)
LACTATE SERPL-SCNC: 0.7 MMOL/L (ref 0.7–2)
LACTATE SERPL-SCNC: 0.9 MMOL/L (ref 0.7–2)
LDH SERPL L TO P-CCNC: 105 U/L (ref 0–250)
LDLC SERPL CALC-MCNC: 105 MG/DL
LEUKOCYTE ESTERASE UR QL STRIP: NEGATIVE
LVEF ECHO: NORMAL
LYMPHOCYTES # BLD AUTO: 0.3 10E3/UL (ref 0.8–5.3)
LYMPHOCYTES # BLD AUTO: 0.4 10E3/UL (ref 0.8–5.3)
LYMPHOCYTES # BLD MANUAL: 0.1 10E3/UL (ref 0.8–5.3)
LYMPHOCYTES NFR BLD AUTO: 2 %
LYMPHOCYTES NFR BLD AUTO: 2 %
LYMPHOCYTES NFR BLD AUTO: 3 %
LYMPHOCYTES NFR BLD AUTO: 4 %
LYMPHOCYTES NFR BLD MANUAL: 2 %
MAGNESIUM SERPL-MCNC: 1.4 MG/DL (ref 1.7–2.3)
MAGNESIUM SERPL-MCNC: 1.5 MG/DL (ref 1.7–2.3)
MAGNESIUM SERPL-MCNC: 1.5 MG/DL (ref 1.7–2.3)
MAGNESIUM SERPL-MCNC: 1.6 MG/DL (ref 1.7–2.3)
MAGNESIUM SERPL-MCNC: 1.7 MG/DL (ref 1.7–2.3)
MAGNESIUM SERPL-MCNC: 1.8 MG/DL (ref 1.7–2.3)
MAGNESIUM SERPL-MCNC: 1.9 MG/DL (ref 1.7–2.3)
MAGNESIUM SERPL-MCNC: 2 MG/DL (ref 1.7–2.3)
MCH RBC QN AUTO: 31.2 PG (ref 26.5–33)
MCH RBC QN AUTO: 31.6 PG (ref 26.5–33)
MCH RBC QN AUTO: 31.8 PG (ref 26.5–33)
MCH RBC QN AUTO: 32.1 PG (ref 26.5–33)
MCH RBC QN AUTO: 32.2 PG (ref 26.5–33)
MCH RBC QN AUTO: 32.3 PG (ref 26.5–33)
MCHC RBC AUTO-ENTMCNC: 33.2 G/DL (ref 31.5–36.5)
MCHC RBC AUTO-ENTMCNC: 33.3 G/DL (ref 31.5–36.5)
MCHC RBC AUTO-ENTMCNC: 33.6 G/DL (ref 31.5–36.5)
MCHC RBC AUTO-ENTMCNC: 33.6 G/DL (ref 31.5–36.5)
MCHC RBC AUTO-ENTMCNC: 33.8 G/DL (ref 31.5–36.5)
MCHC RBC AUTO-ENTMCNC: 34.2 G/DL (ref 31.5–36.5)
MCV RBC AUTO: 93 FL (ref 78–100)
MCV RBC AUTO: 94 FL (ref 78–100)
MCV RBC AUTO: 95 FL (ref 78–100)
MCV RBC AUTO: 97 FL (ref 78–100)
METAMYELOCYTES # BLD MANUAL: 0.1 10E3/UL
METAMYELOCYTES NFR BLD MANUAL: 1 %
MONOCYTES # BLD AUTO: 0.9 10E3/UL (ref 0–1.3)
MONOCYTES # BLD AUTO: 1.2 10E3/UL (ref 0–1.3)
MONOCYTES # BLD AUTO: 1.2 10E3/UL (ref 0–1.3)
MONOCYTES # BLD AUTO: 1.7 10E3/UL (ref 0–1.3)
MONOCYTES # BLD MANUAL: 0.2 10E3/UL (ref 0–1.3)
MONOCYTES NFR BLD AUTO: 8 %
MONOCYTES NFR BLD AUTO: 9 %
MONOCYTES NFR BLD MANUAL: 3 %
MRSA DNA SPEC QL NAA+PROBE: NEGATIVE
NDM: NORMAL
NEUTROPHILS # BLD AUTO: 11.8 10E3/UL (ref 1.6–8.3)
NEUTROPHILS # BLD AUTO: 12.8 10E3/UL (ref 1.6–8.3)
NEUTROPHILS # BLD AUTO: 15.7 10E3/UL (ref 1.6–8.3)
NEUTROPHILS # BLD AUTO: 9.1 10E3/UL (ref 1.6–8.3)
NEUTROPHILS # BLD MANUAL: 6.9 10E3/UL (ref 1.6–8.3)
NEUTROPHILS NFR BLD AUTO: 85 %
NEUTROPHILS NFR BLD AUTO: 87 %
NEUTROPHILS NFR BLD AUTO: 87 %
NEUTROPHILS NFR BLD AUTO: 88 %
NEUTROPHILS NFR BLD MANUAL: 93 %
NITRATE UR QL: NEGATIVE
NONHDLC SERPL-MCNC: 129 MG/DL
NRBC # BLD AUTO: 0 10E3/UL
NRBC BLD AUTO-RTO: 0 /100
O2/TOTAL GAS SETTING VFR VENT: 21 %
OXA (DETECTED/NOT DETECTED): NORMAL
P AXIS - MUSE: 70 DEGREES
PATH REPORT.COMMENTS IMP SPEC: NORMAL
PATH REPORT.FINAL DX SPEC: NORMAL
PATH REPORT.MICROSCOPIC SPEC OTHER STN: NORMAL
PATH REPORT.MICROSCOPIC SPEC OTHER STN: NORMAL
PATH REPORT.RELEVANT HX SPEC: NORMAL
PCO2 BLD: 41 MM HG (ref 35–45)
PH BLD: 7.35 [PH] (ref 7.35–7.45)
PH UR STRIP: 5.5 [PH] (ref 5–7)
PHOSPHATE SERPL-MCNC: 1.7 MG/DL (ref 2.5–4.5)
PHOSPHATE SERPL-MCNC: 2 MG/DL (ref 2.5–4.5)
PHOSPHATE SERPL-MCNC: 2.6 MG/DL (ref 2.5–4.5)
PHOSPHATE SERPL-MCNC: 2.7 MG/DL (ref 2.5–4.5)
PHOSPHATE SERPL-MCNC: 3.7 MG/DL (ref 2.5–4.5)
PLAT MORPH BLD: ABNORMAL
PLATELET # BLD AUTO: 266 10E3/UL (ref 150–450)
PLATELET # BLD AUTO: 321 10E3/UL (ref 150–450)
PLATELET # BLD AUTO: 333 10E3/UL (ref 150–450)
PLATELET # BLD AUTO: 347 10E3/UL (ref 150–450)
PLATELET # BLD AUTO: 419 10E3/UL (ref 150–450)
PLATELET # BLD AUTO: 454 10E3/UL (ref 150–450)
PO2 BLD: 72 MM HG (ref 80–105)
POTASSIUM SERPL-SCNC: 3 MMOL/L (ref 3.4–5.3)
POTASSIUM SERPL-SCNC: 3.1 MMOL/L (ref 3.4–5.3)
POTASSIUM SERPL-SCNC: 3.2 MMOL/L (ref 3.4–5.3)
POTASSIUM SERPL-SCNC: 3.3 MMOL/L (ref 3.4–5.3)
POTASSIUM SERPL-SCNC: 3.6 MMOL/L (ref 3.4–5.3)
POTASSIUM SERPL-SCNC: 3.7 MMOL/L (ref 3.4–5.3)
POTASSIUM SERPL-SCNC: 3.8 MMOL/L (ref 3.4–5.3)
PR INTERVAL - MUSE: 130 MS
PROCALCITONIN SERPL IA-MCNC: 6.25 NG/ML
PROT SERPL-MCNC: 5.1 G/DL (ref 6.4–8.3)
PROT SERPL-MCNC: 5.5 G/DL (ref 6.4–8.3)
PROTEUS SPECIES: NOT DETECTED
PSEUDOMONAS AERUGINOSA: NOT DETECTED
QRS DURATION - MUSE: 84 MS
QT - MUSE: 332 MS
QTC - MUSE: 455 MS
R AXIS - MUSE: 65 DEGREES
RADIOLOGIST FLAGS: ABNORMAL
RBC # BLD AUTO: 2.15 10E6/UL (ref 4.4–5.9)
RBC # BLD AUTO: 2.36 10E6/UL (ref 4.4–5.9)
RBC # BLD AUTO: 2.45 10E6/UL (ref 4.4–5.9)
RBC # BLD AUTO: 2.48 10E6/UL (ref 4.4–5.9)
RBC # BLD AUTO: 2.5 10E6/UL (ref 4.4–5.9)
RBC # BLD AUTO: 2.53 10E6/UL (ref 4.4–5.9)
RBC MORPH BLD: ABNORMAL
RBC URINE: 2 /HPF
RETICS # AUTO: 0.01 10E6/UL (ref 0.03–0.1)
RETICS # AUTO: 0.01 10E6/UL (ref 0.03–0.1)
RETICS/RBC NFR AUTO: 0.5 % (ref 0.5–2)
RETICS/RBC NFR AUTO: 0.5 % (ref 0.5–2)
RSV RNA SPEC NAA+PROBE: NEGATIVE
SA TARGET DNA: NEGATIVE
SARS-COV-2 RNA RESP QL NAA+PROBE: NEGATIVE
SODIUM SERPL-SCNC: 134 MMOL/L (ref 136–145)
SODIUM SERPL-SCNC: 135 MMOL/L (ref 136–145)
SODIUM SERPL-SCNC: 139 MMOL/L (ref 136–145)
SODIUM SERPL-SCNC: 143 MMOL/L (ref 136–145)
SODIUM SERPL-SCNC: 146 MMOL/L (ref 136–145)
SODIUM SERPL-SCNC: 146 MMOL/L (ref 136–145)
SP GR UR STRIP: 1.01 (ref 1–1.03)
SPECIMEN EXPIRATION DATE: NORMAL
SPECIMEN EXPIRATION DATE: NORMAL
SYSTOLIC BLOOD PRESSURE - MUSE: NORMAL MMHG
T AXIS - MUSE: 78 DEGREES
TRIGL SERPL-MCNC: 121 MG/DL
TSH SERPL DL<=0.005 MIU/L-ACNC: 1.26 UIU/ML (ref 0.3–4.2)
UNIT ABO/RH: NORMAL
UNIT NUMBER: NORMAL
UNIT STATUS: NORMAL
UNIT TYPE ISBT: 600
UROBILINOGEN UR STRIP-MCNC: NORMAL MG/DL
VENTRICULAR RATE- MUSE: 113 BPM
VIM: NORMAL
VIT B12 SERPL-MCNC: 1018 PG/ML (ref 232–1245)
WBC # BLD AUTO: 10.7 10E3/UL (ref 4–11)
WBC # BLD AUTO: 12.6 10E3/UL (ref 4–11)
WBC # BLD AUTO: 13.6 10E3/UL (ref 4–11)
WBC # BLD AUTO: 14.6 10E3/UL (ref 4–11)
WBC # BLD AUTO: 18.2 10E3/UL (ref 4–11)
WBC # BLD AUTO: 7.4 10E3/UL (ref 4–11)
WBC URINE: 4 /HPF

## 2023-01-01 PROCEDURE — 250N000011 HC RX IP 250 OP 636

## 2023-01-01 PROCEDURE — 93308 TTE F-UP OR LMTD: CPT

## 2023-01-01 PROCEDURE — 120N000013 HC R&B IMCU

## 2023-01-01 PROCEDURE — 250N000011 HC RX IP 250 OP 636: Mod: JZ

## 2023-01-01 PROCEDURE — 83615 LACTATE (LD) (LDH) ENZYME: CPT | Performed by: INTERNAL MEDICINE

## 2023-01-01 PROCEDURE — 86850 RBC ANTIBODY SCREEN: CPT

## 2023-01-01 PROCEDURE — 250N000013 HC RX MED GY IP 250 OP 250 PS 637: Performed by: INTERNAL MEDICINE

## 2023-01-01 PROCEDURE — 250N000013 HC RX MED GY IP 250 OP 250 PS 637: Performed by: EMERGENCY MEDICINE

## 2023-01-01 PROCEDURE — 83605 ASSAY OF LACTIC ACID: CPT | Performed by: HOSPITALIST

## 2023-01-01 PROCEDURE — 36415 COLL VENOUS BLD VENIPUNCTURE: CPT | Performed by: STUDENT IN AN ORGANIZED HEALTH CARE EDUCATION/TRAINING PROGRAM

## 2023-01-01 PROCEDURE — 70450 CT HEAD/BRAIN W/O DYE: CPT | Mod: 77

## 2023-01-01 PROCEDURE — 85027 COMPLETE CBC AUTOMATED: CPT | Performed by: EMERGENCY MEDICINE

## 2023-01-01 PROCEDURE — 99233 SBSQ HOSP IP/OBS HIGH 50: CPT | Performed by: STUDENT IN AN ORGANIZED HEALTH CARE EDUCATION/TRAINING PROGRAM

## 2023-01-01 PROCEDURE — 258N000003 HC RX IP 258 OP 636: Performed by: INTERNAL MEDICINE

## 2023-01-01 PROCEDURE — 86901 BLOOD TYPING SEROLOGIC RH(D): CPT | Performed by: HOSPITALIST

## 2023-01-01 PROCEDURE — 84100 ASSAY OF PHOSPHORUS: CPT | Performed by: STUDENT IN AN ORGANIZED HEALTH CARE EDUCATION/TRAINING PROGRAM

## 2023-01-01 PROCEDURE — 250N000011 HC RX IP 250 OP 636: Mod: JZ | Performed by: INTERNAL MEDICINE

## 2023-01-01 PROCEDURE — 85004 AUTOMATED DIFF WBC COUNT: CPT | Performed by: HOSPITALIST

## 2023-01-01 PROCEDURE — 84100 ASSAY OF PHOSPHORUS: CPT | Performed by: HOSPITALIST

## 2023-01-01 PROCEDURE — 99223 1ST HOSP IP/OBS HIGH 75: CPT | Performed by: INTERNAL MEDICINE

## 2023-01-01 PROCEDURE — 93321 DOPPLER ECHO F-UP/LMTD STD: CPT | Mod: 26 | Performed by: INTERNAL MEDICINE

## 2023-01-01 PROCEDURE — 83605 ASSAY OF LACTIC ACID: CPT | Performed by: EMERGENCY MEDICINE

## 2023-01-01 PROCEDURE — 82746 ASSAY OF FOLIC ACID SERUM: CPT | Performed by: INTERNAL MEDICINE

## 2023-01-01 PROCEDURE — 85060 BLOOD SMEAR INTERPRETATION: CPT | Performed by: PATHOLOGY

## 2023-01-01 PROCEDURE — 250N000013 HC RX MED GY IP 250 OP 250 PS 637: Performed by: HOSPITALIST

## 2023-01-01 PROCEDURE — 83735 ASSAY OF MAGNESIUM: CPT | Performed by: HOSPITALIST

## 2023-01-01 PROCEDURE — 250N000009 HC RX 250: Performed by: HOSPITALIST

## 2023-01-01 PROCEDURE — 70491 CT SOFT TISSUE NECK W/DYE: CPT

## 2023-01-01 PROCEDURE — 86140 C-REACTIVE PROTEIN: CPT | Performed by: PHYSICIAN ASSISTANT

## 2023-01-01 PROCEDURE — 84132 ASSAY OF SERUM POTASSIUM: CPT | Performed by: STUDENT IN AN ORGANIZED HEALTH CARE EDUCATION/TRAINING PROGRAM

## 2023-01-01 PROCEDURE — 85045 AUTOMATED RETICULOCYTE COUNT: CPT | Performed by: INTERNAL MEDICINE

## 2023-01-01 PROCEDURE — 999N000040 HC STATISTIC CONSULT NO CHARGE VASC ACCESS

## 2023-01-01 PROCEDURE — 258N000003 HC RX IP 258 OP 636: Performed by: SPECIALIST

## 2023-01-01 PROCEDURE — 258N000003 HC RX IP 258 OP 636: Performed by: HOSPITALIST

## 2023-01-01 PROCEDURE — 250N000011 HC RX IP 250 OP 636: Mod: JZ | Performed by: PAIN MEDICINE

## 2023-01-01 PROCEDURE — 250N000011 HC RX IP 250 OP 636: Performed by: STUDENT IN AN ORGANIZED HEALTH CARE EDUCATION/TRAINING PROGRAM

## 2023-01-01 PROCEDURE — 255N000002 HC RX 255 OP 636: Performed by: INTERNAL MEDICINE

## 2023-01-01 PROCEDURE — 80048 BASIC METABOLIC PNL TOTAL CA: CPT | Performed by: STUDENT IN AN ORGANIZED HEALTH CARE EDUCATION/TRAINING PROGRAM

## 2023-01-01 PROCEDURE — 72125 CT NECK SPINE W/O DYE: CPT

## 2023-01-01 PROCEDURE — 99222 1ST HOSP IP/OBS MODERATE 55: CPT | Performed by: PHYSICIAN ASSISTANT

## 2023-01-01 PROCEDURE — 258N000003 HC RX IP 258 OP 636

## 2023-01-01 PROCEDURE — 82374 ASSAY BLOOD CARBON DIOXIDE: CPT | Performed by: INTERNAL MEDICINE

## 2023-01-01 PROCEDURE — 93308 TTE F-UP OR LMTD: CPT | Mod: 26 | Performed by: INTERNAL MEDICINE

## 2023-01-01 PROCEDURE — 250N000009 HC RX 250

## 2023-01-01 PROCEDURE — 74018 RADEX ABDOMEN 1 VIEW: CPT

## 2023-01-01 PROCEDURE — 258N000003 HC RX IP 258 OP 636: Performed by: EMERGENCY MEDICINE

## 2023-01-01 PROCEDURE — 250N000011 HC RX IP 250 OP 636: Performed by: HOSPITALIST

## 2023-01-01 PROCEDURE — 36415 COLL VENOUS BLD VENIPUNCTURE: CPT | Performed by: HOSPITALIST

## 2023-01-01 PROCEDURE — 258N000003 HC RX IP 258 OP 636: Performed by: STUDENT IN AN ORGANIZED HEALTH CARE EDUCATION/TRAINING PROGRAM

## 2023-01-01 PROCEDURE — 99233 SBSQ HOSP IP/OBS HIGH 50: CPT | Mod: GC | Performed by: PSYCHIATRY & NEUROLOGY

## 2023-01-01 PROCEDURE — 96374 THER/PROPH/DIAG INJ IV PUSH: CPT

## 2023-01-01 PROCEDURE — 80053 COMPREHEN METABOLIC PANEL: CPT | Performed by: HOSPITALIST

## 2023-01-01 PROCEDURE — 99255 IP/OBS CONSLTJ NEW/EST HI 80: CPT | Performed by: PAIN MEDICINE

## 2023-01-01 PROCEDURE — 36415 COLL VENOUS BLD VENIPUNCTURE: CPT | Performed by: INTERNAL MEDICINE

## 2023-01-01 PROCEDURE — 99232 SBSQ HOSP IP/OBS MODERATE 35: CPT | Performed by: PHYSICIAN ASSISTANT

## 2023-01-01 PROCEDURE — 70450 CT HEAD/BRAIN W/O DYE: CPT

## 2023-01-01 PROCEDURE — 99233 SBSQ HOSP IP/OBS HIGH 50: CPT | Performed by: HOSPITALIST

## 2023-01-01 PROCEDURE — 250N000011 HC RX IP 250 OP 636: Performed by: INTERNAL MEDICINE

## 2023-01-01 PROCEDURE — P9016 RBC LEUKOCYTES REDUCED: HCPCS

## 2023-01-01 PROCEDURE — 85025 COMPLETE CBC W/AUTO DIFF WBC: CPT | Performed by: HOSPITALIST

## 2023-01-01 PROCEDURE — 82077 ASSAY SPEC XCP UR&BREATH IA: CPT | Performed by: EMERGENCY MEDICINE

## 2023-01-01 PROCEDURE — 82550 ASSAY OF CK (CPK): CPT | Performed by: STUDENT IN AN ORGANIZED HEALTH CARE EDUCATION/TRAINING PROGRAM

## 2023-01-01 PROCEDURE — 99233 SBSQ HOSP IP/OBS HIGH 50: CPT | Performed by: SPECIALIST

## 2023-01-01 PROCEDURE — 255N000002 HC RX 255 OP 636: Performed by: OTOLARYNGOLOGY

## 2023-01-01 PROCEDURE — 82607 VITAMIN B-12: CPT | Performed by: INTERNAL MEDICINE

## 2023-01-01 PROCEDURE — 86850 RBC ANTIBODY SCREEN: CPT | Performed by: HOSPITALIST

## 2023-01-01 PROCEDURE — 84443 ASSAY THYROID STIM HORMONE: CPT | Performed by: STUDENT IN AN ORGANIZED HEALTH CARE EDUCATION/TRAINING PROGRAM

## 2023-01-01 PROCEDURE — 250N000011 HC RX IP 250 OP 636: Mod: JZ | Performed by: STUDENT IN AN ORGANIZED HEALTH CARE EDUCATION/TRAINING PROGRAM

## 2023-01-01 PROCEDURE — 71046 X-RAY EXAM CHEST 2 VIEWS: CPT

## 2023-01-01 PROCEDURE — 95816 EEG AWAKE AND DROWSY: CPT

## 2023-01-01 PROCEDURE — 86923 COMPATIBILITY TEST ELECTRIC: CPT

## 2023-01-01 PROCEDURE — 99238 HOSP IP/OBS DSCHRG MGMT 30/<: CPT | Performed by: NURSE PRACTITIONER

## 2023-01-01 PROCEDURE — 250N000011 HC RX IP 250 OP 636: Mod: JZ | Performed by: SPECIALIST

## 2023-01-01 PROCEDURE — 99222 1ST HOSP IP/OBS MODERATE 55: CPT | Performed by: SPECIALIST

## 2023-01-01 PROCEDURE — 82728 ASSAY OF FERRITIN: CPT | Performed by: INTERNAL MEDICINE

## 2023-01-01 PROCEDURE — 96361 HYDRATE IV INFUSION ADD-ON: CPT

## 2023-01-01 PROCEDURE — 36415 COLL VENOUS BLD VENIPUNCTURE: CPT | Performed by: EMERGENCY MEDICINE

## 2023-01-01 PROCEDURE — 999N000065 XR ABDOMEN 1 VIEW

## 2023-01-01 PROCEDURE — 83550 IRON BINDING TEST: CPT | Performed by: INTERNAL MEDICINE

## 2023-01-01 PROCEDURE — 99207 PR NO BILLABLE SERVICE THIS VISIT: CPT

## 2023-01-01 PROCEDURE — 84132 ASSAY OF SERUM POTASSIUM: CPT | Performed by: HOSPITALIST

## 2023-01-01 PROCEDURE — 99207 PR NO CHARGE NURSE ONLY: CPT | Performed by: INTERNAL MEDICINE

## 2023-01-01 PROCEDURE — 83605 ASSAY OF LACTIC ACID: CPT | Performed by: INTERNAL MEDICINE

## 2023-01-01 PROCEDURE — 83735 ASSAY OF MAGNESIUM: CPT | Performed by: STUDENT IN AN ORGANIZED HEALTH CARE EDUCATION/TRAINING PROGRAM

## 2023-01-01 PROCEDURE — 87641 MR-STAPH DNA AMP PROBE: CPT | Performed by: INTERNAL MEDICINE

## 2023-01-01 PROCEDURE — 83605 ASSAY OF LACTIC ACID: CPT | Performed by: STUDENT IN AN ORGANIZED HEALTH CARE EDUCATION/TRAINING PROGRAM

## 2023-01-01 PROCEDURE — 81001 URINALYSIS AUTO W/SCOPE: CPT | Performed by: EMERGENCY MEDICINE

## 2023-01-01 PROCEDURE — 82310 ASSAY OF CALCIUM: CPT | Performed by: HOSPITALIST

## 2023-01-01 PROCEDURE — 250N000013 HC RX MED GY IP 250 OP 250 PS 637: Performed by: STUDENT IN AN ORGANIZED HEALTH CARE EDUCATION/TRAINING PROGRAM

## 2023-01-01 PROCEDURE — 95816 EEG AWAKE AND DROWSY: CPT | Mod: 26 | Performed by: PSYCHIATRY & NEUROLOGY

## 2023-01-01 PROCEDURE — 85652 RBC SED RATE AUTOMATED: CPT | Performed by: PHYSICIAN ASSISTANT

## 2023-01-01 PROCEDURE — 85007 BL SMEAR W/DIFF WBC COUNT: CPT | Performed by: EMERGENCY MEDICINE

## 2023-01-01 PROCEDURE — 82947 ASSAY GLUCOSE BLOOD QUANT: CPT | Performed by: EMERGENCY MEDICINE

## 2023-01-01 PROCEDURE — 99291 CRITICAL CARE FIRST HOUR: CPT | Mod: 25

## 2023-01-01 PROCEDURE — 80061 LIPID PANEL: CPT | Performed by: HOSPITALIST

## 2023-01-01 PROCEDURE — 85027 COMPLETE CBC AUTOMATED: CPT | Performed by: INTERNAL MEDICINE

## 2023-01-01 PROCEDURE — 93325 DOPPLER ECHO COLOR FLOW MAPG: CPT

## 2023-01-01 PROCEDURE — 250N000009 HC RX 250: Performed by: INTERNAL MEDICINE

## 2023-01-01 PROCEDURE — 72156 MRI NECK SPINE W/O & W/DYE: CPT

## 2023-01-01 PROCEDURE — 99254 IP/OBS CNSLTJ NEW/EST MOD 60: CPT | Mod: GC | Performed by: PSYCHIATRY & NEUROLOGY

## 2023-01-01 PROCEDURE — A9585 GADOBUTROL INJECTION: HCPCS | Performed by: INTERNAL MEDICINE

## 2023-01-01 PROCEDURE — 99207 PR NO BILLABLE SERVICE THIS VISIT: CPT | Performed by: NURSE PRACTITIONER

## 2023-01-01 PROCEDURE — 70486 CT MAXILLOFACIAL W/O DYE: CPT

## 2023-01-01 PROCEDURE — 70553 MRI BRAIN STEM W/O & W/DYE: CPT

## 2023-01-01 PROCEDURE — 36415 COLL VENOUS BLD VENIPUNCTURE: CPT | Performed by: PHYSICIAN ASSISTANT

## 2023-01-01 PROCEDURE — 250N000011 HC RX IP 250 OP 636: Performed by: NURSE PRACTITIONER

## 2023-01-01 PROCEDURE — 86901 BLOOD TYPING SEROLOGIC RH(D): CPT

## 2023-01-01 PROCEDURE — 70480 CT ORBIT/EAR/FOSSA W/O DYE: CPT

## 2023-01-01 PROCEDURE — 80053 COMPREHEN METABOLIC PANEL: CPT | Performed by: EMERGENCY MEDICINE

## 2023-01-01 PROCEDURE — 93005 ELECTROCARDIOGRAM TRACING: CPT

## 2023-01-01 PROCEDURE — 250N000011 HC RX IP 250 OP 636: Performed by: EMERGENCY MEDICINE

## 2023-01-01 PROCEDURE — 83036 HEMOGLOBIN GLYCOSYLATED A1C: CPT | Performed by: HOSPITALIST

## 2023-01-01 PROCEDURE — 120N000001 HC R&B MED SURG/OB

## 2023-01-01 PROCEDURE — 87149 DNA/RNA DIRECT PROBE: CPT | Performed by: EMERGENCY MEDICINE

## 2023-01-01 PROCEDURE — 93325 DOPPLER ECHO COLOR FLOW MAPG: CPT | Mod: 26 | Performed by: INTERNAL MEDICINE

## 2023-01-01 PROCEDURE — 87637 SARSCOV2&INF A&B&RSV AMP PRB: CPT | Performed by: EMERGENCY MEDICINE

## 2023-01-01 PROCEDURE — 82374 ASSAY BLOOD CARBON DIOXIDE: CPT | Performed by: EMERGENCY MEDICINE

## 2023-01-01 PROCEDURE — 84145 PROCALCITONIN (PCT): CPT

## 2023-01-01 PROCEDURE — 85025 COMPLETE CBC W/AUTO DIFF WBC: CPT | Performed by: INTERNAL MEDICINE

## 2023-01-01 PROCEDURE — 82803 BLOOD GASES ANY COMBINATION: CPT

## 2023-01-01 PROCEDURE — 85004 AUTOMATED DIFF WBC COUNT: CPT | Performed by: STUDENT IN AN ORGANIZED HEALTH CARE EDUCATION/TRAINING PROGRAM

## 2023-01-01 PROCEDURE — 87040 BLOOD CULTURE FOR BACTERIA: CPT | Performed by: EMERGENCY MEDICINE

## 2023-01-01 PROCEDURE — 85018 HEMOGLOBIN: CPT | Performed by: HOSPITALIST

## 2023-01-01 PROCEDURE — 87040 BLOOD CULTURE FOR BACTERIA: CPT | Performed by: STUDENT IN AN ORGANIZED HEALTH CARE EDUCATION/TRAINING PROGRAM

## 2023-01-01 RX ORDER — GLYCOPYRROLATE 1 MG/1
2 TABLET ORAL EVERY 4 HOURS PRN
Status: DISCONTINUED | OUTPATIENT
Start: 2023-01-01 | End: 2023-01-01 | Stop reason: HOSPADM

## 2023-01-01 RX ORDER — HYDROMORPHONE HYDROCHLORIDE 1 MG/ML
0.5 INJECTION, SOLUTION INTRAMUSCULAR; INTRAVENOUS; SUBCUTANEOUS
Status: DISCONTINUED | OUTPATIENT
Start: 2023-01-01 | End: 2023-01-01

## 2023-01-01 RX ORDER — LABETALOL HYDROCHLORIDE 5 MG/ML
5 INJECTION, SOLUTION INTRAVENOUS
Status: DISCONTINUED | OUTPATIENT
Start: 2023-01-01 | End: 2023-01-01

## 2023-01-01 RX ORDER — MAGNESIUM SULFATE HEPTAHYDRATE 40 MG/ML
2 INJECTION, SOLUTION INTRAVENOUS ONCE
Status: COMPLETED | OUTPATIENT
Start: 2023-01-01 | End: 2023-01-01

## 2023-01-01 RX ORDER — NALOXONE HYDROCHLORIDE 0.4 MG/ML
0.2 INJECTION, SOLUTION INTRAMUSCULAR; INTRAVENOUS; SUBCUTANEOUS
Status: DISCONTINUED | OUTPATIENT
Start: 2023-01-01 | End: 2023-01-01 | Stop reason: HOSPADM

## 2023-01-01 RX ORDER — CARBOXYMETHYLCELLULOSE SODIUM 5 MG/ML
1-2 SOLUTION/ DROPS OPHTHALMIC
Status: DISCONTINUED | OUTPATIENT
Start: 2023-01-01 | End: 2023-01-01 | Stop reason: HOSPADM

## 2023-01-01 RX ORDER — OLANZAPINE 10 MG/2ML
5 INJECTION, POWDER, FOR SOLUTION INTRAMUSCULAR DAILY PRN
Status: DISCONTINUED | OUTPATIENT
Start: 2023-01-01 | End: 2023-01-01

## 2023-01-01 RX ORDER — POTASSIUM CHLORIDE 7.45 MG/ML
10 INJECTION INTRAVENOUS
Status: COMPLETED | OUTPATIENT
Start: 2023-01-01 | End: 2023-01-01

## 2023-01-01 RX ORDER — VANCOMYCIN HYDROCHLORIDE 500 MG/10ML
500 INJECTION, POWDER, LYOPHILIZED, FOR SOLUTION INTRAVENOUS EVERY 24 HOURS
Status: DISCONTINUED | OUTPATIENT
Start: 2023-01-01 | End: 2023-01-01

## 2023-01-01 RX ORDER — LIDOCAINE 40 MG/G
CREAM TOPICAL
Status: DISCONTINUED | OUTPATIENT
Start: 2023-01-01 | End: 2023-01-01 | Stop reason: HOSPADM

## 2023-01-01 RX ORDER — VANCOMYCIN HYDROCHLORIDE 1 G/200ML
1000 INJECTION, SOLUTION INTRAVENOUS EVERY 24 HOURS
Status: DISCONTINUED | OUTPATIENT
Start: 2023-01-01 | End: 2023-01-01 | Stop reason: DRUGHIGH

## 2023-01-01 RX ORDER — ONDANSETRON 8 MG/1
8 TABLET, ORALLY DISINTEGRATING ORAL EVERY 8 HOURS PRN
COMMUNITY

## 2023-01-01 RX ORDER — OLANZAPINE 5 MG/1
5 TABLET ORAL
Status: DISCONTINUED | OUTPATIENT
Start: 2023-01-01 | End: 2023-01-01

## 2023-01-01 RX ORDER — HYDROMORPHONE HYDROCHLORIDE 1 MG/ML
0.3 INJECTION, SOLUTION INTRAMUSCULAR; INTRAVENOUS; SUBCUTANEOUS
Status: DISCONTINUED | OUTPATIENT
Start: 2023-01-01 | End: 2023-01-01 | Stop reason: HOSPADM

## 2023-01-01 RX ORDER — MAGNESIUM SULFATE HEPTAHYDRATE 40 MG/ML
2 INJECTION, SOLUTION INTRAVENOUS ONCE
Status: DISCONTINUED | OUTPATIENT
Start: 2023-01-01 | End: 2023-01-01

## 2023-01-01 RX ORDER — NALOXONE HYDROCHLORIDE 0.4 MG/ML
0.4 INJECTION, SOLUTION INTRAMUSCULAR; INTRAVENOUS; SUBCUTANEOUS
Status: CANCELLED | OUTPATIENT
Start: 2023-01-01

## 2023-01-01 RX ORDER — HEPARIN SODIUM (PORCINE) LOCK FLUSH IV SOLN 100 UNIT/ML 100 UNIT/ML
5-10 SOLUTION INTRAVENOUS
Status: DISCONTINUED | OUTPATIENT
Start: 2023-01-01 | End: 2023-01-01 | Stop reason: HOSPADM

## 2023-01-01 RX ORDER — NALOXONE HYDROCHLORIDE 0.4 MG/ML
0.4 INJECTION, SOLUTION INTRAMUSCULAR; INTRAVENOUS; SUBCUTANEOUS
Status: DISCONTINUED | OUTPATIENT
Start: 2023-01-01 | End: 2023-01-01 | Stop reason: HOSPADM

## 2023-01-01 RX ORDER — HEPARIN SODIUM,PORCINE 10 UNIT/ML
5-10 VIAL (ML) INTRAVENOUS EVERY 24 HOURS
Status: DISCONTINUED | OUTPATIENT
Start: 2023-01-01 | End: 2023-01-01 | Stop reason: HOSPADM

## 2023-01-01 RX ORDER — HALOPERIDOL 5 MG/ML
2 INJECTION INTRAMUSCULAR
Status: DISCONTINUED | OUTPATIENT
Start: 2023-01-01 | End: 2023-01-01 | Stop reason: HOSPADM

## 2023-01-01 RX ORDER — ACETAMINOPHEN 650 MG/1
650 SUPPOSITORY RECTAL EVERY 6 HOURS PRN
Status: DISCONTINUED | OUTPATIENT
Start: 2023-01-01 | End: 2023-01-01

## 2023-01-01 RX ORDER — IOPAMIDOL 755 MG/ML
100 INJECTION, SOLUTION INTRAVASCULAR ONCE
Status: COMPLETED | OUTPATIENT
Start: 2023-01-01 | End: 2023-01-01

## 2023-01-01 RX ORDER — LEVETIRACETAM 5 MG/ML
500 INJECTION INTRAVASCULAR ONCE
Status: COMPLETED | OUTPATIENT
Start: 2023-01-01 | End: 2023-01-01

## 2023-01-01 RX ORDER — PIPERACILLIN SODIUM, TAZOBACTAM SODIUM 4; .5 G/20ML; G/20ML
4.5 INJECTION, POWDER, LYOPHILIZED, FOR SOLUTION INTRAVENOUS ONCE
Status: COMPLETED | OUTPATIENT
Start: 2023-01-01 | End: 2023-01-01

## 2023-01-01 RX ORDER — ONDANSETRON 2 MG/ML
4 INJECTION INTRAMUSCULAR; INTRAVENOUS EVERY 6 HOURS PRN
Status: DISCONTINUED | OUTPATIENT
Start: 2023-01-01 | End: 2023-01-01 | Stop reason: HOSPADM

## 2023-01-01 RX ORDER — IOPAMIDOL 755 MG/ML
100 INJECTION, SOLUTION INTRAVASCULAR ONCE
Status: DISCONTINUED | OUTPATIENT
Start: 2023-01-01 | End: 2023-01-01

## 2023-01-01 RX ORDER — HYDROMORPHONE HYDROCHLORIDE 1 MG/ML
0.3 INJECTION, SOLUTION INTRAMUSCULAR; INTRAVENOUS; SUBCUTANEOUS
Status: DISCONTINUED | OUTPATIENT
Start: 2023-01-01 | End: 2023-01-01

## 2023-01-01 RX ORDER — NITROGLYCERIN 0.4 MG/1
0.4 TABLET SUBLINGUAL EVERY 5 MIN PRN
Status: DISCONTINUED | OUTPATIENT
Start: 2023-01-01 | End: 2023-01-01

## 2023-01-01 RX ORDER — LEVETIRACETAM 5 MG/ML
500 INJECTION INTRAVASCULAR EVERY 12 HOURS
Status: DISCONTINUED | OUTPATIENT
Start: 2023-01-01 | End: 2023-01-01 | Stop reason: HOSPADM

## 2023-01-01 RX ORDER — OXYCODONE HYDROCHLORIDE 5 MG/1
5-10 TABLET ORAL EVERY 4 HOURS PRN
Status: DISCONTINUED | OUTPATIENT
Start: 2023-01-01 | End: 2023-01-01

## 2023-01-01 RX ORDER — HYDROMORPHONE HYDROCHLORIDE 1 MG/ML
INJECTION, SOLUTION INTRAMUSCULAR; INTRAVENOUS; SUBCUTANEOUS
Status: DISPENSED
Start: 2023-01-01 | End: 2023-01-01

## 2023-01-01 RX ORDER — LORAZEPAM 2 MG/ML
0.5 INJECTION INTRAMUSCULAR ONCE
Status: COMPLETED | OUTPATIENT
Start: 2023-01-01 | End: 2023-01-01

## 2023-01-01 RX ORDER — AMPICILLIN AND SULBACTAM 2; 1 G/1; G/1
3 INJECTION, POWDER, FOR SOLUTION INTRAMUSCULAR; INTRAVENOUS EVERY 6 HOURS
Status: DISCONTINUED | OUTPATIENT
Start: 2023-01-01 | End: 2023-01-01

## 2023-01-01 RX ORDER — HYDROMORPHONE HYDROCHLORIDE 1 MG/ML
1 SOLUTION ORAL
Status: DISCONTINUED | OUTPATIENT
Start: 2023-01-01 | End: 2023-01-01 | Stop reason: HOSPADM

## 2023-01-01 RX ORDER — FENTANYL CITRATE 50 UG/ML
25-50 INJECTION, SOLUTION INTRAMUSCULAR; INTRAVENOUS EVERY 5 MIN PRN
Status: CANCELLED | OUTPATIENT
Start: 2023-01-01

## 2023-01-01 RX ORDER — SODIUM CHLORIDE 9 MG/ML
INJECTION, SOLUTION INTRAVENOUS CONTINUOUS
Status: DISCONTINUED | OUTPATIENT
Start: 2023-01-01 | End: 2023-01-01

## 2023-01-01 RX ORDER — LISINOPRIL AND HYDROCHLOROTHIAZIDE 12.5; 2 MG/1; MG/1
1 TABLET ORAL DAILY
Qty: 60 TABLET | OUTPATIENT
Start: 2023-01-01

## 2023-01-01 RX ORDER — SODIUM CHLORIDE 9 MG/ML
INJECTION, SOLUTION INTRAVENOUS ONCE
Status: COMPLETED | OUTPATIENT
Start: 2023-01-01 | End: 2023-01-01

## 2023-01-01 RX ORDER — ACETAMINOPHEN 500 MG
1000 TABLET ORAL ONCE
Status: COMPLETED | OUTPATIENT
Start: 2023-01-01 | End: 2023-01-01

## 2023-01-01 RX ORDER — PIPERACILLIN SODIUM, TAZOBACTAM SODIUM 2; .25 G/10ML; G/10ML
2.25 INJECTION, POWDER, LYOPHILIZED, FOR SOLUTION INTRAVENOUS EVERY 6 HOURS
Status: DISCONTINUED | OUTPATIENT
Start: 2023-01-01 | End: 2023-01-01

## 2023-01-01 RX ORDER — OLANZAPINE 10 MG/2ML
5 INJECTION, POWDER, FOR SOLUTION INTRAMUSCULAR 2 TIMES DAILY PRN
Status: DISCONTINUED | OUTPATIENT
Start: 2023-01-01 | End: 2023-01-01

## 2023-01-01 RX ORDER — GABAPENTIN 100 MG/1
200 CAPSULE ORAL 2 TIMES DAILY
Status: DISCONTINUED | OUTPATIENT
Start: 2023-01-01 | End: 2023-01-01

## 2023-01-01 RX ORDER — WATER 10 ML/10ML
INJECTION INTRAMUSCULAR; INTRAVENOUS; SUBCUTANEOUS
Status: COMPLETED
Start: 2023-01-01 | End: 2023-01-01

## 2023-01-01 RX ORDER — NITROGLYCERIN 0.4 MG/1
0.4 TABLET SUBLINGUAL EVERY 5 MIN PRN
Status: DISCONTINUED | OUTPATIENT
Start: 2023-01-01 | End: 2023-01-01 | Stop reason: HOSPADM

## 2023-01-01 RX ORDER — LIDOCAINE 40 MG/G
CREAM TOPICAL
Status: DISCONTINUED | OUTPATIENT
Start: 2023-01-01 | End: 2023-01-01

## 2023-01-01 RX ORDER — LORAZEPAM 2 MG/ML
1 INJECTION INTRAMUSCULAR ONCE
Status: COMPLETED | OUTPATIENT
Start: 2023-01-01 | End: 2023-01-01

## 2023-01-01 RX ORDER — OLANZAPINE 5 MG/1
5 TABLET ORAL
COMMUNITY

## 2023-01-01 RX ORDER — HEPARIN SODIUM,PORCINE 10 UNIT/ML
5-10 VIAL (ML) INTRAVENOUS
Status: DISCONTINUED | OUTPATIENT
Start: 2023-01-01 | End: 2023-01-01 | Stop reason: HOSPADM

## 2023-01-01 RX ORDER — PIPERACILLIN SODIUM, TAZOBACTAM SODIUM 3; .375 G/15ML; G/15ML
3.38 INJECTION, POWDER, LYOPHILIZED, FOR SOLUTION INTRAVENOUS EVERY 6 HOURS
Status: DISCONTINUED | OUTPATIENT
Start: 2023-01-01 | End: 2023-01-01

## 2023-01-01 RX ORDER — ACETAMINOPHEN 325 MG/1
650 TABLET ORAL EVERY 6 HOURS PRN
Status: DISCONTINUED | OUTPATIENT
Start: 2023-01-01 | End: 2023-01-01

## 2023-01-01 RX ORDER — AMLODIPINE BESYLATE 5 MG/1
TABLET ORAL
Qty: 90 TABLET | Refills: 0 | Status: SHIPPED | OUTPATIENT
Start: 2023-01-01

## 2023-01-01 RX ORDER — CEFTRIAXONE 2 G/1
2 INJECTION, POWDER, FOR SOLUTION INTRAMUSCULAR; INTRAVENOUS EVERY 12 HOURS
Status: DISCONTINUED | OUTPATIENT
Start: 2023-01-01 | End: 2023-01-01

## 2023-01-01 RX ORDER — NALOXONE HYDROCHLORIDE 0.4 MG/ML
0.2 INJECTION, SOLUTION INTRAMUSCULAR; INTRAVENOUS; SUBCUTANEOUS
Status: CANCELLED | OUTPATIENT
Start: 2023-01-01

## 2023-01-01 RX ORDER — HYDROMORPHONE HYDROCHLORIDE 1 MG/ML
0.5 INJECTION, SOLUTION INTRAMUSCULAR; INTRAVENOUS; SUBCUTANEOUS
Status: DISCONTINUED | OUTPATIENT
Start: 2023-01-01 | End: 2023-01-01 | Stop reason: HOSPADM

## 2023-01-01 RX ORDER — CEFAZOLIN SODIUM 2 G/100ML
2 INJECTION, SOLUTION INTRAVENOUS
Status: DISCONTINUED | OUTPATIENT
Start: 2023-01-01 | End: 2023-01-01

## 2023-01-01 RX ORDER — LORAZEPAM 2 MG/ML
1 INJECTION INTRAMUSCULAR
Status: DISCONTINUED | OUTPATIENT
Start: 2023-01-01 | End: 2023-01-01 | Stop reason: HOSPADM

## 2023-01-01 RX ORDER — CYCLOBENZAPRINE HCL 10 MG
10 TABLET ORAL 3 TIMES DAILY PRN
COMMUNITY

## 2023-01-01 RX ORDER — FOLIC ACID 5 MG/ML
1 INJECTION, SOLUTION INTRAMUSCULAR; INTRAVENOUS; SUBCUTANEOUS DAILY
Status: DISCONTINUED | OUTPATIENT
Start: 2023-01-01 | End: 2023-01-01

## 2023-01-01 RX ORDER — LISINOPRIL AND HYDROCHLOROTHIAZIDE 12.5; 2 MG/1; MG/1
1 TABLET ORAL DAILY
Qty: 90 TABLET | Refills: 0 | Status: SHIPPED | OUTPATIENT
Start: 2023-01-01

## 2023-01-01 RX ORDER — FLUMAZENIL 0.1 MG/ML
0.2 INJECTION, SOLUTION INTRAVENOUS
Status: CANCELLED | OUTPATIENT
Start: 2023-01-01

## 2023-01-01 RX ORDER — HYDROMORPHONE HYDROCHLORIDE 1 MG/ML
0.3 INJECTION, SOLUTION INTRAMUSCULAR; INTRAVENOUS; SUBCUTANEOUS EVERY 6 HOURS PRN
Status: DISCONTINUED | OUTPATIENT
Start: 2023-01-01 | End: 2023-01-01

## 2023-01-01 RX ORDER — HYDROMORPHONE HYDROCHLORIDE 2 MG/1
2 TABLET ORAL
Status: DISCONTINUED | OUTPATIENT
Start: 2023-01-01 | End: 2023-01-01 | Stop reason: HOSPADM

## 2023-01-01 RX ORDER — GABAPENTIN 100 MG/1
200 CAPSULE ORAL 2 TIMES DAILY
COMMUNITY

## 2023-01-01 RX ORDER — ACETAMINOPHEN 650 MG/1
650 SUPPOSITORY RECTAL EVERY 6 HOURS PRN
Status: DISCONTINUED | OUTPATIENT
Start: 2023-01-01 | End: 2023-01-01 | Stop reason: HOSPADM

## 2023-01-01 RX ORDER — HYDROMORPHONE HCL IN WATER/PF 6 MG/30 ML
0.2 PATIENT CONTROLLED ANALGESIA SYRINGE INTRAVENOUS
Status: DISCONTINUED | OUTPATIENT
Start: 2023-01-01 | End: 2023-01-01

## 2023-01-01 RX ORDER — DEXTROSE MONOHYDRATE 100 MG/ML
INJECTION, SOLUTION INTRAVENOUS CONTINUOUS PRN
Status: DISCONTINUED | OUTPATIENT
Start: 2023-01-01 | End: 2023-01-01

## 2023-01-01 RX ORDER — OXYCODONE HYDROCHLORIDE 5 MG/1
5 TABLET ORAL ONCE
Status: COMPLETED | OUTPATIENT
Start: 2023-01-01 | End: 2023-01-01

## 2023-01-01 RX ORDER — GADOBUTROL 604.72 MG/ML
5 INJECTION INTRAVENOUS ONCE
Status: COMPLETED | OUTPATIENT
Start: 2023-01-01 | End: 2023-01-01

## 2023-01-01 RX ORDER — HYDROMORPHONE HYDROCHLORIDE 1 MG/ML
2 SOLUTION ORAL
Status: DISCONTINUED | OUTPATIENT
Start: 2023-01-01 | End: 2023-01-01 | Stop reason: HOSPADM

## 2023-01-01 RX ORDER — SODIUM CHLORIDE 9 MG/ML
INJECTION, SOLUTION INTRAVENOUS CONTINUOUS
Status: DISCONTINUED | OUTPATIENT
Start: 2023-01-01 | End: 2023-01-01 | Stop reason: HOSPADM

## 2023-01-01 RX ORDER — IBUPROFEN 200 MG
200 TABLET ORAL EVERY 6 HOURS PRN
COMMUNITY

## 2023-01-01 RX ORDER — ACETAMINOPHEN 325 MG/1
650 TABLET ORAL EVERY 6 HOURS PRN
Status: DISCONTINUED | OUTPATIENT
Start: 2023-01-01 | End: 2023-01-01 | Stop reason: HOSPADM

## 2023-01-01 RX ORDER — SALIVA STIMULANT COMB. NO.3
2 SPRAY, NON-AEROSOL (ML) MUCOUS MEMBRANE
Status: DISCONTINUED | OUTPATIENT
Start: 2023-01-01 | End: 2023-01-01 | Stop reason: HOSPADM

## 2023-01-01 RX ORDER — ATROPINE SULFATE 10 MG/ML
2 SOLUTION/ DROPS OPHTHALMIC EVERY 4 HOURS PRN
Status: DISCONTINUED | OUTPATIENT
Start: 2023-01-01 | End: 2023-01-01 | Stop reason: HOSPADM

## 2023-01-01 RX ORDER — LORAZEPAM 1 MG/1
1 TABLET ORAL
Status: DISCONTINUED | OUTPATIENT
Start: 2023-01-01 | End: 2023-01-01 | Stop reason: HOSPADM

## 2023-01-01 RX ORDER — ONDANSETRON 4 MG/1
4 TABLET, ORALLY DISINTEGRATING ORAL EVERY 6 HOURS PRN
Status: DISCONTINUED | OUTPATIENT
Start: 2023-01-01 | End: 2023-01-01 | Stop reason: HOSPADM

## 2023-01-01 RX ORDER — HYDRALAZINE HYDROCHLORIDE 20 MG/ML
10 INJECTION INTRAMUSCULAR; INTRAVENOUS
Status: DISCONTINUED | OUTPATIENT
Start: 2023-01-01 | End: 2023-01-01

## 2023-01-01 RX ORDER — FOLIC ACID 5 MG/ML
1 INJECTION, SOLUTION INTRAMUSCULAR; INTRAVENOUS; SUBCUTANEOUS ONCE
Status: DISCONTINUED | OUTPATIENT
Start: 2023-01-01 | End: 2023-01-01

## 2023-01-01 RX ORDER — METRONIDAZOLE 500 MG/100ML
500 INJECTION, SOLUTION INTRAVENOUS EVERY 6 HOURS
Status: DISCONTINUED | OUTPATIENT
Start: 2023-01-01 | End: 2023-01-01

## 2023-01-01 RX ADMIN — MAGNESIUM SULFATE HEPTAHYDRATE 2 G: 40 INJECTION, SOLUTION INTRAVENOUS at 16:11

## 2023-01-01 RX ADMIN — DEXTROSE MONOHYDRATE AND SODIUM CHLORIDE: 5; .45 INJECTION, SOLUTION INTRAVENOUS at 10:09

## 2023-01-01 RX ADMIN — HYDROMORPHONE HYDROCHLORIDE 0.5 MG: 1 INJECTION, SOLUTION INTRAMUSCULAR; INTRAVENOUS; SUBCUTANEOUS at 09:40

## 2023-01-01 RX ADMIN — LEVETIRACETAM 500 MG: 5 INJECTION INTRAVENOUS at 08:04

## 2023-01-01 RX ADMIN — HYDROMORPHONE HYDROCHLORIDE 0.6 MG: 1 INJECTION, SOLUTION INTRAMUSCULAR; INTRAVENOUS; SUBCUTANEOUS at 03:33

## 2023-01-01 RX ADMIN — HYDROMORPHONE HYDROCHLORIDE 0.6 MG: 1 INJECTION, SOLUTION INTRAMUSCULAR; INTRAVENOUS; SUBCUTANEOUS at 11:32

## 2023-01-01 RX ADMIN — HYDROMORPHONE HYDROCHLORIDE 0.6 MG: 1 INJECTION, SOLUTION INTRAMUSCULAR; INTRAVENOUS; SUBCUTANEOUS at 00:18

## 2023-01-01 RX ADMIN — SODIUM CHLORIDE: 9 INJECTION, SOLUTION INTRAVENOUS at 22:22

## 2023-01-01 RX ADMIN — SODIUM CHLORIDE 60 ML: 9 INJECTION, SOLUTION INTRAVENOUS at 09:56

## 2023-01-01 RX ADMIN — SODIUM CHLORIDE 60 ML: 9 INJECTION, SOLUTION INTRAVENOUS at 09:00

## 2023-01-01 RX ADMIN — HYDROMORPHONE HYDROCHLORIDE 0.5 MG: 1 INJECTION, SOLUTION INTRAMUSCULAR; INTRAVENOUS; SUBCUTANEOUS at 15:08

## 2023-01-01 RX ADMIN — MEROPENEM 2 G: 1 INJECTION, POWDER, FOR SOLUTION INTRAVENOUS at 22:34

## 2023-01-01 RX ADMIN — SODIUM CHLORIDE: 9 INJECTION, SOLUTION INTRAVENOUS at 16:30

## 2023-01-01 RX ADMIN — HYDROMORPHONE HYDROCHLORIDE 0.6 MG: 1 INJECTION, SOLUTION INTRAMUSCULAR; INTRAVENOUS; SUBCUTANEOUS at 21:26

## 2023-01-01 RX ADMIN — HYDROMORPHONE HYDROCHLORIDE 0.5 MG: 1 INJECTION, SOLUTION INTRAMUSCULAR; INTRAVENOUS; SUBCUTANEOUS at 10:43

## 2023-01-01 RX ADMIN — MEROPENEM 2 G: 1 INJECTION, POWDER, FOR SOLUTION INTRAVENOUS at 07:02

## 2023-01-01 RX ADMIN — HYDROMORPHONE HYDROCHLORIDE 0.6 MG: 1 INJECTION, SOLUTION INTRAMUSCULAR; INTRAVENOUS; SUBCUTANEOUS at 02:49

## 2023-01-01 RX ADMIN — HYDRALAZINE HYDROCHLORIDE 10 MG: 20 INJECTION INTRAMUSCULAR; INTRAVENOUS at 09:09

## 2023-01-01 RX ADMIN — HYDROMORPHONE HYDROCHLORIDE 0.6 MG: 1 INJECTION, SOLUTION INTRAMUSCULAR; INTRAVENOUS; SUBCUTANEOUS at 01:32

## 2023-01-01 RX ADMIN — GABAPENTIN 200 MG: 100 CAPSULE ORAL at 08:34

## 2023-01-01 RX ADMIN — HYDROMORPHONE HYDROCHLORIDE 0.3 MG: 1 INJECTION, SOLUTION INTRAMUSCULAR; INTRAVENOUS; SUBCUTANEOUS at 17:26

## 2023-01-01 RX ADMIN — PIPERACILLIN AND TAZOBACTAM 2.25 G: 2; .25 INJECTION, POWDER, FOR SOLUTION INTRAVENOUS at 06:03

## 2023-01-01 RX ADMIN — LORAZEPAM 1 MG: 2 INJECTION INTRAMUSCULAR; INTRAVENOUS at 00:04

## 2023-01-01 RX ADMIN — IOHEXOL 100 ML: 350 INJECTION, SOLUTION INTRAVENOUS at 15:16

## 2023-01-01 RX ADMIN — Medication 5 ML: at 14:42

## 2023-01-01 RX ADMIN — ACETAMINOPHEN 650 MG: 650 SUPPOSITORY RECTAL at 08:52

## 2023-01-01 RX ADMIN — LORAZEPAM 0.5 MG: 2 INJECTION INTRAMUSCULAR; INTRAVENOUS at 14:30

## 2023-01-01 RX ADMIN — SODIUM CHLORIDE 1000 ML: 9 INJECTION, SOLUTION INTRAVENOUS at 06:00

## 2023-01-01 RX ADMIN — ACETAMINOPHEN 650 MG: 650 SUPPOSITORY RECTAL at 03:22

## 2023-01-01 RX ADMIN — HYDROMORPHONE HYDROCHLORIDE 0.3 MG: 1 INJECTION, SOLUTION INTRAMUSCULAR; INTRAVENOUS; SUBCUTANEOUS at 11:54

## 2023-01-01 RX ADMIN — HYDROMORPHONE HYDROCHLORIDE 0.6 MG: 1 INJECTION, SOLUTION INTRAMUSCULAR; INTRAVENOUS; SUBCUTANEOUS at 20:07

## 2023-01-01 RX ADMIN — HYDROMORPHONE HYDROCHLORIDE 0.6 MG: 1 INJECTION, SOLUTION INTRAMUSCULAR; INTRAVENOUS; SUBCUTANEOUS at 06:02

## 2023-01-01 RX ADMIN — HYDROMORPHONE HYDROCHLORIDE 0.5 MG: 1 INJECTION, SOLUTION INTRAMUSCULAR; INTRAVENOUS; SUBCUTANEOUS at 02:26

## 2023-01-01 RX ADMIN — Medication 5 ML: at 04:20

## 2023-01-01 RX ADMIN — PIPERACILLIN AND TAZOBACTAM 2.25 G: 2; .25 INJECTION, POWDER, FOR SOLUTION INTRAVENOUS at 02:17

## 2023-01-01 RX ADMIN — HYDROMORPHONE HYDROCHLORIDE 0.6 MG: 1 INJECTION, SOLUTION INTRAMUSCULAR; INTRAVENOUS; SUBCUTANEOUS at 05:08

## 2023-01-01 RX ADMIN — HYDROMORPHONE HYDROCHLORIDE 0.6 MG: 1 INJECTION, SOLUTION INTRAMUSCULAR; INTRAVENOUS; SUBCUTANEOUS at 10:44

## 2023-01-01 RX ADMIN — LEVETIRACETAM 500 MG: 5 INJECTION INTRAVENOUS at 08:08

## 2023-01-01 RX ADMIN — MEROPENEM 2 G: 1 INJECTION, POWDER, FOR SOLUTION INTRAVENOUS at 16:06

## 2023-01-01 RX ADMIN — HYDROMORPHONE HYDROCHLORIDE 1 MG: 1 INJECTION, SOLUTION INTRAMUSCULAR; INTRAVENOUS; SUBCUTANEOUS at 04:02

## 2023-01-01 RX ADMIN — LEVETIRACETAM 500 MG: 5 INJECTION INTRAVENOUS at 22:25

## 2023-01-01 RX ADMIN — FOLIC ACID 1 MG: 5 INJECTION, SOLUTION INTRAMUSCULAR; INTRAVENOUS; SUBCUTANEOUS at 12:38

## 2023-01-01 RX ADMIN — LEVETIRACETAM 500 MG: 5 INJECTION INTRAVENOUS at 08:35

## 2023-01-01 RX ADMIN — HYDROMORPHONE HYDROCHLORIDE 0.3 MG: 1 INJECTION, SOLUTION INTRAMUSCULAR; INTRAVENOUS; SUBCUTANEOUS at 09:46

## 2023-01-01 RX ADMIN — HYDROMORPHONE HYDROCHLORIDE 0.3 MG: 1 INJECTION, SOLUTION INTRAMUSCULAR; INTRAVENOUS; SUBCUTANEOUS at 20:22

## 2023-01-01 RX ADMIN — SODIUM CHLORIDE: 9 INJECTION, SOLUTION INTRAVENOUS at 22:46

## 2023-01-01 RX ADMIN — MEROPENEM 2 G: 1 INJECTION, POWDER, FOR SOLUTION INTRAVENOUS at 15:12

## 2023-01-01 RX ADMIN — HYDROMORPHONE HYDROCHLORIDE 0.5 MG: 1 INJECTION, SOLUTION INTRAMUSCULAR; INTRAVENOUS; SUBCUTANEOUS at 07:53

## 2023-01-01 RX ADMIN — SODIUM CHLORIDE: 9 INJECTION, SOLUTION INTRAVENOUS at 09:05

## 2023-01-01 RX ADMIN — HYDROMORPHONE HYDROCHLORIDE 0.3 MG: 1 INJECTION, SOLUTION INTRAMUSCULAR; INTRAVENOUS; SUBCUTANEOUS at 01:42

## 2023-01-01 RX ADMIN — SODIUM CHLORIDE 1000 ML: 9 INJECTION, SOLUTION INTRAVENOUS at 17:22

## 2023-01-01 RX ADMIN — SODIUM CHLORIDE: 9 INJECTION, SOLUTION INTRAVENOUS at 02:36

## 2023-01-01 RX ADMIN — LEVETIRACETAM 500 MG: 5 INJECTION INTRAVENOUS at 08:50

## 2023-01-01 RX ADMIN — SODIUM CHLORIDE: 9 INJECTION, SOLUTION INTRAVENOUS at 17:30

## 2023-01-01 RX ADMIN — HYDROMORPHONE HYDROCHLORIDE 0.5 MG: 1 INJECTION, SOLUTION INTRAMUSCULAR; INTRAVENOUS; SUBCUTANEOUS at 06:01

## 2023-01-01 RX ADMIN — HYDROMORPHONE HYDROCHLORIDE 0.5 MG: 1 INJECTION, SOLUTION INTRAMUSCULAR; INTRAVENOUS; SUBCUTANEOUS at 17:52

## 2023-01-01 RX ADMIN — HYDROMORPHONE HYDROCHLORIDE 0.5 MG: 1 INJECTION, SOLUTION INTRAMUSCULAR; INTRAVENOUS; SUBCUTANEOUS at 19:34

## 2023-01-01 RX ADMIN — HYDROMORPHONE HYDROCHLORIDE 0.6 MG: 1 INJECTION, SOLUTION INTRAMUSCULAR; INTRAVENOUS; SUBCUTANEOUS at 09:29

## 2023-01-01 RX ADMIN — HYDROMORPHONE HYDROCHLORIDE 0.6 MG: 1 INJECTION, SOLUTION INTRAMUSCULAR; INTRAVENOUS; SUBCUTANEOUS at 10:28

## 2023-01-01 RX ADMIN — HYDROMORPHONE HYDROCHLORIDE 0.6 MG: 1 INJECTION, SOLUTION INTRAMUSCULAR; INTRAVENOUS; SUBCUTANEOUS at 21:15

## 2023-01-01 RX ADMIN — LABETALOL HYDROCHLORIDE 5 MG: 5 INJECTION INTRAVENOUS at 15:17

## 2023-01-01 RX ADMIN — VANCOMYCIN HYDROCHLORIDE 1250 MG: 10 INJECTION, POWDER, LYOPHILIZED, FOR SOLUTION INTRAVENOUS at 18:51

## 2023-01-01 RX ADMIN — HYDROMORPHONE HYDROCHLORIDE 0.5 MG: 1 INJECTION, SOLUTION INTRAMUSCULAR; INTRAVENOUS; SUBCUTANEOUS at 23:19

## 2023-01-01 RX ADMIN — FOLIC ACID 1 MG: 5 INJECTION, SOLUTION INTRAMUSCULAR; INTRAVENOUS; SUBCUTANEOUS at 09:35

## 2023-01-01 RX ADMIN — OXYCODONE HYDROCHLORIDE 10 MG: 5 TABLET ORAL at 08:57

## 2023-01-01 RX ADMIN — HYDROMORPHONE HYDROCHLORIDE 0.6 MG: 1 INJECTION, SOLUTION INTRAMUSCULAR; INTRAVENOUS; SUBCUTANEOUS at 07:59

## 2023-01-01 RX ADMIN — HYDROMORPHONE HYDROCHLORIDE 0.6 MG: 1 INJECTION, SOLUTION INTRAMUSCULAR; INTRAVENOUS; SUBCUTANEOUS at 17:33

## 2023-01-01 RX ADMIN — HYDROMORPHONE HYDROCHLORIDE 0.3 MG: 1 INJECTION, SOLUTION INTRAMUSCULAR; INTRAVENOUS; SUBCUTANEOUS at 15:08

## 2023-01-01 RX ADMIN — HYDROMORPHONE HYDROCHLORIDE 0.5 MG: 1 INJECTION, SOLUTION INTRAMUSCULAR; INTRAVENOUS; SUBCUTANEOUS at 00:29

## 2023-01-01 RX ADMIN — POTASSIUM CHLORIDE 10 MEQ: 10 INJECTION, SOLUTION INTRAVENOUS at 13:54

## 2023-01-01 RX ADMIN — POTASSIUM CHLORIDE 10 MEQ: 10 INJECTION, SOLUTION INTRAVENOUS at 11:30

## 2023-01-01 RX ADMIN — MAGNESIUM SULFATE HEPTAHYDRATE 2 G: 40 INJECTION, SOLUTION INTRAVENOUS at 10:18

## 2023-01-01 RX ADMIN — OXYCODONE HYDROCHLORIDE 5 MG: 5 TABLET ORAL at 08:26

## 2023-01-01 RX ADMIN — SODIUM CHLORIDE 1000 ML: 9 INJECTION, SOLUTION INTRAVENOUS at 20:02

## 2023-01-01 RX ADMIN — IOPAMIDOL 100 ML: 755 INJECTION, SOLUTION INTRAVENOUS at 09:56

## 2023-01-01 RX ADMIN — CEFTRIAXONE SODIUM 2 G: 2 INJECTION, POWDER, FOR SOLUTION INTRAMUSCULAR; INTRAVENOUS at 13:56

## 2023-01-01 RX ADMIN — VANCOMYCIN HYDROCHLORIDE 750 MG: 1 INJECTION, POWDER, LYOPHILIZED, FOR SOLUTION INTRAVENOUS at 14:21

## 2023-01-01 RX ADMIN — HYDROMORPHONE HYDROCHLORIDE 0.6 MG: 1 INJECTION, SOLUTION INTRAMUSCULAR; INTRAVENOUS; SUBCUTANEOUS at 01:53

## 2023-01-01 RX ADMIN — ACETAMINOPHEN 650 MG: 325 TABLET, FILM COATED ORAL at 16:13

## 2023-01-01 RX ADMIN — SODIUM CHLORIDE: 9 INJECTION, SOLUTION INTRAVENOUS at 06:42

## 2023-01-01 RX ADMIN — HYDROMORPHONE HYDROCHLORIDE 0.5 MG: 1 INJECTION, SOLUTION INTRAMUSCULAR; INTRAVENOUS; SUBCUTANEOUS at 01:15

## 2023-01-01 RX ADMIN — OLANZAPINE 5 MG: 10 INJECTION, POWDER, FOR SOLUTION INTRAMUSCULAR at 20:18

## 2023-01-01 RX ADMIN — HYDROMORPHONE HYDROCHLORIDE 0.6 MG: 1 INJECTION, SOLUTION INTRAMUSCULAR; INTRAVENOUS; SUBCUTANEOUS at 12:41

## 2023-01-01 RX ADMIN — HYDROMORPHONE HYDROCHLORIDE 0.3 MG: 1 INJECTION, SOLUTION INTRAMUSCULAR; INTRAVENOUS; SUBCUTANEOUS at 06:00

## 2023-01-01 RX ADMIN — MEROPENEM 2 G: 1 INJECTION, POWDER, FOR SOLUTION INTRAVENOUS at 15:54

## 2023-01-01 RX ADMIN — HYDROMORPHONE HYDROCHLORIDE 0.5 MG: 1 INJECTION, SOLUTION INTRAMUSCULAR; INTRAVENOUS; SUBCUTANEOUS at 23:40

## 2023-01-01 RX ADMIN — HYDROMORPHONE HYDROCHLORIDE 0.6 MG: 1 INJECTION, SOLUTION INTRAMUSCULAR; INTRAVENOUS; SUBCUTANEOUS at 11:53

## 2023-01-01 RX ADMIN — MEROPENEM 2 G: 1 INJECTION, POWDER, FOR SOLUTION INTRAVENOUS at 06:08

## 2023-01-01 RX ADMIN — HYDROMORPHONE HYDROCHLORIDE 0.5 MG: 1 INJECTION, SOLUTION INTRAMUSCULAR; INTRAVENOUS; SUBCUTANEOUS at 00:05

## 2023-01-01 RX ADMIN — HYDROMORPHONE HYDROCHLORIDE 0.5 MG: 1 INJECTION, SOLUTION INTRAMUSCULAR; INTRAVENOUS; SUBCUTANEOUS at 17:20

## 2023-01-01 RX ADMIN — LEVETIRACETAM 500 MG: 5 INJECTION INTRAVENOUS at 09:09

## 2023-01-01 RX ADMIN — ACETAMINOPHEN 650 MG: 325 TABLET, FILM COATED ORAL at 01:42

## 2023-01-01 RX ADMIN — HYDROMORPHONE HYDROCHLORIDE 0.5 MG: 1 INJECTION, SOLUTION INTRAMUSCULAR; INTRAVENOUS; SUBCUTANEOUS at 21:12

## 2023-01-01 RX ADMIN — LEVETIRACETAM 500 MG: 5 INJECTION INTRAVENOUS at 20:54

## 2023-01-01 RX ADMIN — HYDROMORPHONE HYDROCHLORIDE 0.2 MG: 0.2 INJECTION, SOLUTION INTRAMUSCULAR; INTRAVENOUS; SUBCUTANEOUS at 16:54

## 2023-01-01 RX ADMIN — POTASSIUM CHLORIDE 10 MEQ: 10 INJECTION, SOLUTION INTRAVENOUS at 11:40

## 2023-01-01 RX ADMIN — WATER 10 ML: 1 INJECTION INTRAMUSCULAR; INTRAVENOUS; SUBCUTANEOUS at 21:49

## 2023-01-01 RX ADMIN — WATER: 1 INJECTION INTRAMUSCULAR; INTRAVENOUS; SUBCUTANEOUS at 20:18

## 2023-01-01 RX ADMIN — OXYCODONE HYDROCHLORIDE 5 MG: 5 TABLET ORAL at 00:01

## 2023-01-01 RX ADMIN — PIPERACILLIN AND TAZOBACTAM 2.25 G: 2; .25 INJECTION, POWDER, FOR SOLUTION INTRAVENOUS at 13:16

## 2023-01-01 RX ADMIN — HYDROMORPHONE HYDROCHLORIDE 0.3 MG: 1 INJECTION, SOLUTION INTRAMUSCULAR; INTRAVENOUS; SUBCUTANEOUS at 16:29

## 2023-01-01 RX ADMIN — SODIUM CHLORIDE: 9 INJECTION, SOLUTION INTRAVENOUS at 12:11

## 2023-01-01 RX ADMIN — FOLIC ACID 1 MG: 5 INJECTION, SOLUTION INTRAMUSCULAR; INTRAVENOUS; SUBCUTANEOUS at 10:20

## 2023-01-01 RX ADMIN — HYDROMORPHONE HYDROCHLORIDE 0.6 MG: 1 INJECTION, SOLUTION INTRAMUSCULAR; INTRAVENOUS; SUBCUTANEOUS at 22:26

## 2023-01-01 RX ADMIN — POTASSIUM CHLORIDE 10 MEQ: 10 INJECTION, SOLUTION INTRAVENOUS at 10:27

## 2023-01-01 RX ADMIN — PIPERACILLIN AND TAZOBACTAM 4.5 G: 4; .5 INJECTION, POWDER, FOR SOLUTION INTRAVENOUS at 17:08

## 2023-01-01 RX ADMIN — LABETALOL HYDROCHLORIDE 5 MG: 5 INJECTION INTRAVENOUS at 06:11

## 2023-01-01 RX ADMIN — HYDROMORPHONE HYDROCHLORIDE 0.6 MG: 1 INJECTION, SOLUTION INTRAMUSCULAR; INTRAVENOUS; SUBCUTANEOUS at 07:36

## 2023-01-01 RX ADMIN — LEVETIRACETAM 500 MG: 5 INJECTION INTRAVENOUS at 20:07

## 2023-01-01 RX ADMIN — POTASSIUM CHLORIDE 10 MEQ: 10 INJECTION, SOLUTION INTRAVENOUS at 06:03

## 2023-01-01 RX ADMIN — ACETAMINOPHEN 1000 MG: 500 TABLET, FILM COATED ORAL at 14:46

## 2023-01-01 RX ADMIN — LORAZEPAM 1 MG: 2 INJECTION INTRAMUSCULAR; INTRAVENOUS at 18:36

## 2023-01-01 RX ADMIN — VANCOMYCIN HYDROCHLORIDE 1250 MG: 10 INJECTION, POWDER, LYOPHILIZED, FOR SOLUTION INTRAVENOUS at 17:48

## 2023-01-01 RX ADMIN — MEROPENEM 2 G: 1 INJECTION, POWDER, FOR SOLUTION INTRAVENOUS at 17:40

## 2023-01-01 RX ADMIN — LEVETIRACETAM 500 MG: 5 INJECTION INTRAVENOUS at 16:30

## 2023-01-01 RX ADMIN — HALOPERIDOL LACTATE 2 MG: 5 INJECTION, SOLUTION INTRAMUSCULAR at 17:30

## 2023-01-01 RX ADMIN — HYDROMORPHONE HYDROCHLORIDE 0.6 MG: 1 INJECTION, SOLUTION INTRAMUSCULAR; INTRAVENOUS; SUBCUTANEOUS at 03:50

## 2023-01-01 RX ADMIN — HYDROMORPHONE HYDROCHLORIDE 0.6 MG: 1 INJECTION, SOLUTION INTRAMUSCULAR; INTRAVENOUS; SUBCUTANEOUS at 16:28

## 2023-01-01 RX ADMIN — WATER 1 ML: 1 INJECTION INTRAMUSCULAR; INTRAVENOUS; SUBCUTANEOUS at 11:32

## 2023-01-01 RX ADMIN — LEVETIRACETAM 500 MG: 5 INJECTION INTRAVENOUS at 21:10

## 2023-01-01 RX ADMIN — FOLIC ACID 1 MG: 5 INJECTION, SOLUTION INTRAMUSCULAR; INTRAVENOUS; SUBCUTANEOUS at 09:08

## 2023-01-01 RX ADMIN — HYDROMORPHONE HYDROCHLORIDE 0.6 MG: 1 INJECTION, SOLUTION INTRAMUSCULAR; INTRAVENOUS; SUBCUTANEOUS at 22:34

## 2023-01-01 RX ADMIN — LABETALOL HYDROCHLORIDE 5 MG: 5 INJECTION INTRAVENOUS at 07:55

## 2023-01-01 RX ADMIN — OXYCODONE HYDROCHLORIDE 10 MG: 5 TABLET ORAL at 01:54

## 2023-01-01 RX ADMIN — HYDROMORPHONE HYDROCHLORIDE 0.5 MG: 1 INJECTION, SOLUTION INTRAMUSCULAR; INTRAVENOUS; SUBCUTANEOUS at 22:43

## 2023-01-01 RX ADMIN — PIPERACILLIN AND TAZOBACTAM 2.25 G: 2; .25 INJECTION, POWDER, FOR SOLUTION INTRAVENOUS at 06:42

## 2023-01-01 RX ADMIN — MAGNESIUM SULFATE HEPTAHYDRATE 2 G: 40 INJECTION, SOLUTION INTRAVENOUS at 16:35

## 2023-01-01 RX ADMIN — HYDROMORPHONE HYDROCHLORIDE 0.6 MG: 1 INJECTION, SOLUTION INTRAMUSCULAR; INTRAVENOUS; SUBCUTANEOUS at 18:42

## 2023-01-01 RX ADMIN — SODIUM CHLORIDE 1000 ML: 9 INJECTION, SOLUTION INTRAVENOUS at 14:50

## 2023-01-01 RX ADMIN — ACETAMINOPHEN 650 MG: 650 SUPPOSITORY RECTAL at 17:19

## 2023-01-01 RX ADMIN — SODIUM PHOSPHATE, MONOBASIC, MONOHYDRATE AND SODIUM PHOSPHATE, DIBASIC, ANHYDROUS 9 MMOL: 142; 276 INJECTION, SOLUTION INTRAVENOUS at 16:47

## 2023-01-01 RX ADMIN — POTASSIUM CHLORIDE 10 MEQ: 10 INJECTION, SOLUTION INTRAVENOUS at 13:49

## 2023-01-01 RX ADMIN — POTASSIUM CHLORIDE 10 MEQ: 10 INJECTION, SOLUTION INTRAVENOUS at 15:07

## 2023-01-01 RX ADMIN — HYDROMORPHONE HYDROCHLORIDE 0.6 MG: 1 INJECTION, SOLUTION INTRAMUSCULAR; INTRAVENOUS; SUBCUTANEOUS at 06:08

## 2023-01-01 RX ADMIN — HYDROMORPHONE HYDROCHLORIDE 0.6 MG: 1 INJECTION, SOLUTION INTRAMUSCULAR; INTRAVENOUS; SUBCUTANEOUS at 13:48

## 2023-01-01 RX ADMIN — IOPAMIDOL 100 ML: 755 INJECTION, SOLUTION INTRAVENOUS at 09:00

## 2023-01-01 RX ADMIN — HYDROMORPHONE HYDROCHLORIDE 0.5 MG: 1 INJECTION, SOLUTION INTRAMUSCULAR; INTRAVENOUS; SUBCUTANEOUS at 12:11

## 2023-01-01 RX ADMIN — HYDROMORPHONE HYDROCHLORIDE 0.6 MG: 1 INJECTION, SOLUTION INTRAMUSCULAR; INTRAVENOUS; SUBCUTANEOUS at 00:16

## 2023-01-01 RX ADMIN — HYDROMORPHONE HYDROCHLORIDE 0.6 MG: 1 INJECTION, SOLUTION INTRAMUSCULAR; INTRAVENOUS; SUBCUTANEOUS at 18:41

## 2023-01-01 RX ADMIN — GABAPENTIN 200 MG: 100 CAPSULE ORAL at 21:10

## 2023-01-01 RX ADMIN — HYDROMORPHONE HYDROCHLORIDE 0.6 MG: 1 INJECTION, SOLUTION INTRAMUSCULAR; INTRAVENOUS; SUBCUTANEOUS at 20:18

## 2023-01-01 RX ADMIN — POTASSIUM CHLORIDE 10 MEQ: 10 INJECTION, SOLUTION INTRAVENOUS at 06:17

## 2023-01-01 RX ADMIN — LORAZEPAM 0.5 MG: 2 INJECTION INTRAMUSCULAR; INTRAVENOUS at 21:23

## 2023-01-01 RX ADMIN — HYDROMORPHONE HYDROCHLORIDE 0.6 MG: 1 INJECTION, SOLUTION INTRAMUSCULAR; INTRAVENOUS; SUBCUTANEOUS at 09:08

## 2023-01-01 RX ADMIN — POTASSIUM CHLORIDE 10 MEQ: 10 INJECTION, SOLUTION INTRAVENOUS at 12:39

## 2023-01-01 RX ADMIN — LABETALOL HYDROCHLORIDE 5 MG: 5 INJECTION INTRAVENOUS at 21:35

## 2023-01-01 RX ADMIN — PIPERACILLIN AND TAZOBACTAM 2.25 G: 2; .25 INJECTION, POWDER, FOR SOLUTION INTRAVENOUS at 18:23

## 2023-01-01 RX ADMIN — HYDROMORPHONE HYDROCHLORIDE 0.5 MG: 1 INJECTION, SOLUTION INTRAMUSCULAR; INTRAVENOUS; SUBCUTANEOUS at 08:46

## 2023-01-01 RX ADMIN — PIPERACILLIN AND TAZOBACTAM 2.25 G: 2; .25 INJECTION, POWDER, FOR SOLUTION INTRAVENOUS at 01:42

## 2023-01-01 RX ADMIN — HYDROMORPHONE HYDROCHLORIDE 0.5 MG: 1 INJECTION, SOLUTION INTRAMUSCULAR; INTRAVENOUS; SUBCUTANEOUS at 01:35

## 2023-01-01 RX ADMIN — HYDROMORPHONE HYDROCHLORIDE 0.5 MG: 1 INJECTION, SOLUTION INTRAMUSCULAR; INTRAVENOUS; SUBCUTANEOUS at 16:11

## 2023-01-01 RX ADMIN — ACETAMINOPHEN 650 MG: 650 SUPPOSITORY RECTAL at 16:12

## 2023-01-01 RX ADMIN — POTASSIUM CHLORIDE 10 MEQ: 10 INJECTION, SOLUTION INTRAVENOUS at 12:41

## 2023-01-01 RX ADMIN — OLANZAPINE 5 MG: 10 INJECTION, POWDER, FOR SOLUTION INTRAMUSCULAR at 21:49

## 2023-01-01 RX ADMIN — HYDROMORPHONE HYDROCHLORIDE 0.6 MG: 1 INJECTION, SOLUTION INTRAMUSCULAR; INTRAVENOUS; SUBCUTANEOUS at 13:27

## 2023-01-01 RX ADMIN — LEVETIRACETAM 500 MG: 5 INJECTION INTRAVENOUS at 21:53

## 2023-01-01 RX ADMIN — HYDROMORPHONE HYDROCHLORIDE 0.5 MG: 1 INJECTION, SOLUTION INTRAMUSCULAR; INTRAVENOUS; SUBCUTANEOUS at 13:08

## 2023-01-01 RX ADMIN — MEROPENEM 2 G: 1 INJECTION, POWDER, FOR SOLUTION INTRAVENOUS at 06:01

## 2023-01-01 RX ADMIN — GABAPENTIN 200 MG: 100 CAPSULE ORAL at 08:48

## 2023-01-01 RX ADMIN — HYDROMORPHONE HYDROCHLORIDE 0.3 MG: 1 INJECTION, SOLUTION INTRAMUSCULAR; INTRAVENOUS; SUBCUTANEOUS at 22:15

## 2023-01-01 RX ADMIN — HYDROMORPHONE HYDROCHLORIDE 0.6 MG: 1 INJECTION, SOLUTION INTRAMUSCULAR; INTRAVENOUS; SUBCUTANEOUS at 15:17

## 2023-01-01 RX ADMIN — LABETALOL HYDROCHLORIDE 5 MG: 5 INJECTION INTRAVENOUS at 16:44

## 2023-01-01 RX ADMIN — OLANZAPINE 5 MG: 10 INJECTION, POWDER, FOR SOLUTION INTRAMUSCULAR at 11:32

## 2023-01-01 RX ADMIN — GABAPENTIN 200 MG: 100 CAPSULE ORAL at 22:25

## 2023-01-01 RX ADMIN — ACETAMINOPHEN 650 MG: 325 TABLET, FILM COATED ORAL at 22:23

## 2023-01-01 RX ADMIN — OXYCODONE HYDROCHLORIDE 10 MG: 5 TABLET ORAL at 12:24

## 2023-01-01 RX ADMIN — ACETAMINOPHEN 650 MG: 325 TABLET, FILM COATED ORAL at 06:07

## 2023-01-01 RX ADMIN — OXYCODONE HYDROCHLORIDE 10 MG: 5 TABLET ORAL at 21:10

## 2023-01-01 RX ADMIN — HYDROMORPHONE HYDROCHLORIDE 0.5 MG: 1 INJECTION, SOLUTION INTRAMUSCULAR; INTRAVENOUS; SUBCUTANEOUS at 14:08

## 2023-01-01 RX ADMIN — GADOBUTROL 5 ML: 604.72 INJECTION INTRAVENOUS at 15:59

## 2023-01-01 RX ADMIN — MEROPENEM 2 G: 1 INJECTION, POWDER, FOR SOLUTION INTRAVENOUS at 23:14

## 2023-01-01 ASSESSMENT — ACTIVITIES OF DAILY LIVING (ADL)
ADLS_ACUITY_SCORE: 27
ADLS_ACUITY_SCORE: 27
ADLS_ACUITY_SCORE: 37
ADLS_ACUITY_SCORE: 39
ADLS_ACUITY_SCORE: 33
ADLS_ACUITY_SCORE: 35
ADLS_ACUITY_SCORE: 27
ADLS_ACUITY_SCORE: 37
ADLS_ACUITY_SCORE: 39
ADLS_ACUITY_SCORE: 37
ADLS_ACUITY_SCORE: 39
ADLS_ACUITY_SCORE: 35
DOING_ERRANDS_INDEPENDENTLY_DIFFICULTY: YES
ADLS_ACUITY_SCORE: 27
ADLS_ACUITY_SCORE: 35
ADLS_ACUITY_SCORE: 37
ADLS_ACUITY_SCORE: 35
ADLS_ACUITY_SCORE: 27
ADLS_ACUITY_SCORE: 39
CHANGE_IN_FUNCTIONAL_STATUS_SINCE_ONSET_OF_CURRENT_ILLNESS/INJURY: NO
ADLS_ACUITY_SCORE: 27
ADLS_ACUITY_SCORE: 35
ADLS_ACUITY_SCORE: 35
FALL_HISTORY_WITHIN_LAST_SIX_MONTHS: YES
NUMBER_OF_TIMES_PATIENT_HAS_FALLEN_WITHIN_LAST_SIX_MONTHS: 1
ADLS_ACUITY_SCORE: 27
WEAR_GLASSES_OR_BLIND: YES
ADLS_ACUITY_SCORE: 39
ADLS_ACUITY_SCORE: 37
ADLS_ACUITY_SCORE: 27
ADLS_ACUITY_SCORE: 39
ADLS_ACUITY_SCORE: 39
ADLS_ACUITY_SCORE: 35
ADLS_ACUITY_SCORE: 35
VISION_MANAGEMENT: GLASSES
ADLS_ACUITY_SCORE: 35
ADLS_ACUITY_SCORE: 27
EATING: 0-->ASSISTANCE NEEDED (DEVELOPMENTALLY APPROPRIATE)
ADLS_ACUITY_SCORE: 39
WALKING_OR_CLIMBING_STAIRS_DIFFICULTY: NO
ADLS_ACUITY_SCORE: 35
ADLS_ACUITY_SCORE: 27
ADLS_ACUITY_SCORE: 37
ADLS_ACUITY_SCORE: 27
ADLS_ACUITY_SCORE: 27
ADLS_ACUITY_SCORE: 35
ADLS_ACUITY_SCORE: 27
ADLS_ACUITY_SCORE: 35
ADLS_ACUITY_SCORE: 27
ADLS_ACUITY_SCORE: 27
ADLS_ACUITY_SCORE: 39
ADLS_ACUITY_SCORE: 35
TOILETING_ISSUES: NO
ADLS_ACUITY_SCORE: 39
EATING: 0-->INDEPENDENT
ADLS_ACUITY_SCORE: 27
ADLS_ACUITY_SCORE: 27
ADLS_ACUITY_SCORE: 39
ADLS_ACUITY_SCORE: 35
ADLS_ACUITY_SCORE: 27
SWALLOWING: 0-->SWALLOWS FOODS/LIQUIDS WITHOUT DIFFICULTY
ADLS_ACUITY_SCORE: 39
ADLS_ACUITY_SCORE: 35
SWALLOWING: 0-->SWALLOWS FOODS/LIQUIDS WITHOUT DIFFICULTY (DEVELOPMENTALLY APPROPRIATE)
ADLS_ACUITY_SCORE: 39
DIFFICULTY_EATING/SWALLOWING: YES
ADLS_ACUITY_SCORE: 35
ADLS_ACUITY_SCORE: 35
ADLS_ACUITY_SCORE: 27
ADLS_ACUITY_SCORE: 35
DRESSING/BATHING_DIFFICULTY: NO
ADLS_ACUITY_SCORE: 37
ADLS_ACUITY_SCORE: 27
ADLS_ACUITY_SCORE: 35
ADLS_ACUITY_SCORE: 35
EATING/SWALLOWING: SWALLOWING LIQUIDS
CONCENTRATING,_REMEMBERING_OR_MAKING_DECISIONS_DIFFICULTY: YES
ADLS_ACUITY_SCORE: 27
ADLS_ACUITY_SCORE: 37
ADLS_ACUITY_SCORE: 27
ADLS_ACUITY_SCORE: 35

## 2023-02-17 NOTE — TELEPHONE ENCOUNTER
Pt not seen in 2 yrs.  No rx since last summer for only 90d  Maybe gets this from his oncologist?

## 2023-02-28 NOTE — PROGRESS NOTES
Iain Fuentes was evaluated at [facility] on February 28, 2023 at which time his blood pressure was:    BP Readings from Last 3 Encounters:   02/28/23 (!) 144/82   08/23/22 120/78   07/16/22 120/76     Pulse Readings from Last 3 Encounters:   08/23/22 80   07/16/22 84   07/14/22 84       Reviewed lifestyle modifications for blood pressure control and reduction: including making healthy food choices, managing weight, getting regular exercise, smoking cessation, reducing alcohol consumption, monitoring blood pressure regularly.     Symptoms: None    BP Goal:< 140/90 mmHg    BP Assessment:  BP too high    Potential Reasons for BP too high: Medication nonadherence    BP Follow-Up Plan: Referral to PCP    Recommendation to Provider: Patient states they are out of lisinopril/hctz.  I saw Dr. Kelly's note about not seeing patient in past 2 years.  I asked patient to call to schedule appt with Dr. Kelly so he can get refills on his medications.  Recommend patient recheck BP at pharmacy 2-4 weeks after restarting lisinopril/hctz    Note completed by: Pola Rubi, PharmD  [facility]  [phone]

## 2023-07-26 PROBLEM — A41.9 SEPSIS (H): Status: ACTIVE | Noted: 2023-01-01

## 2023-07-26 PROBLEM — S02.91XA SKULL FRACTURE (H): Status: ACTIVE | Noted: 2023-01-01

## 2023-07-26 PROBLEM — I62.00 SUBDURAL HEMORRHAGE (H): Status: ACTIVE | Noted: 2023-01-01

## 2023-07-26 PROBLEM — D64.9 ANEMIA: Status: ACTIVE | Noted: 2023-01-01

## 2023-07-26 NOTE — ED PROVIDER NOTES
History   Chief Complaint:  Fall     The history is provided by the patient.      History may be somewhat limited as he is a poor historian.    Iain Fuentes is a 52 year old male with history of hypertension and throat cancer presenting to the emergency department via ambulance for evaluation of a fall.  EMS reports patient was found at a [employer] on the ground. He initially states that he doesn't remember what happened, then later states that he remembers falling but doesn't know when.  He has been having neck pain radiating to the head and shoulder for about 3 weeks, but it is worse today.  He is also experiencing tenderness at anterior head. He endorses rhinorrhea and lightheadedness, but states that he is usually lightheaded due to his medications. Denies pain anywhere else, fever, cough, dysuria, swelling in legs, rashes, or difficulty breathing. Patient takes Advil for his pain and expresses that it usually helps.     EMS Provides Supplemental History in that the patient was found down in the parking lot of a [employer] and had said he had been there for 20 minutes.     Review of External Notes:   Oncology note from 6/30/23.    Family practice medicine note from 7/21/23    Medications:    Lisinopril   Prinzide   Gabapentin   Amlodipine   Zofran   Fentanyl   Flexeril     Past Medical History:    Oropharyngeal cancer   Alcohol dependence   Tobacco abuse   Hypertension    YAYA    Past Surgical History:    Knee surgery  Facial reconstruction surgery    Physical Exam   Patient Vitals for the past 24 hrs:   BP Temp Temp src Pulse Resp SpO2 Height Weight   07/26/23 1815 90/56 -- -- 95 12 98 % -- --   07/26/23 1800 (!) 89/58 -- -- 98 10 99 % -- --   07/26/23 1750 92/60 -- -- 100 11 99 % -- --   07/26/23 1740 (!) 70/49 -- -- 98 (!) 9 98 % -- --   07/26/23 1730 (!) 88/57 -- -- 101 16 97 % -- --   07/26/23 1715 (!) 88/52 -- -- 101 14 97 % -- --   07/26/23 1700 93/57 -- -- 103 12 96 % -- --   07/26/23 1645 98/59 -- --  "104 15 97 % -- --   07/26/23 1630 93/56 -- -- 105 13 97 % -- --   07/26/23 1618 99/57 -- -- 106 12 98 % -- --   07/26/23 1616 99/57 99.7  F (37.6  C) Oral 108 13 -- -- --   07/26/23 1501 110/59 -- -- 120 18 -- -- --   07/26/23 1446 -- -- -- (!) 122 16 -- -- --   07/26/23 1431 106/66 -- -- (!) 126 10 -- -- --   07/26/23 1414 -- -- -- (!) 123 -- 95 % -- --   07/26/23 1359 120/74 -- -- 117 12 -- -- --   07/26/23 1344 -- -- -- 114 17 99 % -- --   07/26/23 1329 104/62 -- -- 113 13 99 % -- --   07/26/23 1312 -- (!) 101.6  F (38.7  C) Oral -- -- -- -- --   07/26/23 1311 97/58 100.4  F (38  C) Temporal 114 -- -- -- --   07/26/23 1310 -- -- -- -- -- 99 % -- --   07/26/23 1301 103/63 -- -- 114 18 98 % 1.676 m (5' 6\") 46.7 kg (103 lb)     Physical Exam  BP 90/56   Pulse 95   Temp 99.7  F (37.6  C) (Oral)   Resp 12   Ht 1.676 m (5' 6\")   Wt 46.7 kg (103 lb)   SpO2 98%   BMI 16.62 kg/m     General: Chronically-ill appearing.  Occasionally moaning in pain.  Head:  Diffuse scalp tenderness to palpation, specifically on the left parietal scalp. Abrasion to the left frontal scalp.  No raccoon eyes or mota's sign.   EENT: PERRL. EOMI. No hemotympanum. Dry mucus membranes. No tongue abrasions, oral lacerations, or dental injury.   Neck: C collar in place. Trachea midline. Midline cervical tenderness to palpation.  CV: Tachycardic and regular rhythm. No appreciable murmurs, rubs, or gallops.   Respiratory: Breathing comfortably on room air. Chest expansion is symmetric. No chest wall tenderness. Lungs clear to auscultation bilaterally without wheezes, rhonchi, or rales.   GI: Soft, non-distended. Non-tender abdomen. No rebound, rigidity, or guarding.   Msk:    Upper extremities: No tenderness to the shoulders, elbows, or wrists bilaterally. Able to fully range upper extremities. Sensation intact to light touch throughout.    Lower extremities: No tenderness to the knees or ankles bilaterally. Able to fully range lower " extremities. Sensation intact to light touch throughout.    Pelvis: Stable. No tenderness with palpation of the pelvis. No lower extremity rotation or shortening.  Skin: Warm and dry. Abrasion to the left elbow.  Neuro: Slightly confused, oriented to self and time.  Unable to explain clearly why he is in the ER.  Initially says the year and day of week wrong but able to self-correct. CN II-XII grossly intact. Speech clear. Sensation intact to light touch and symmetrical.   Psych: Appropriate mood and affect.     Emergency Department Course   ECG  ECG taken at 1328  Sinus tachycardia  Cannot rule out Anterior infarct , age undetermined  Abnormal ECG  Rate 113 bpm. AK interval 130 ms. QRS duration 84 ms. QT/QTc 332/455 ms. P-R-T axes 70 65 78.     Imaging:  Cervical spine CT w/o contrast   Final Result   IMPRESSION: Mild degenerative anterolisthesis of C2 upon C3 and C3   upon C4. Mild degenerative retrolisthesis of C4 upon C5. Alignment of   the cervical vertebrae is otherwise normal; however, there is reversal   of normal cervical lordosis centered at the T4 level. Vertebral body   heights of the cervical spine are normal. Craniocervical alignment is   normal. There are no fractures of the cervical spine. Loss of disc   space height and degenerative endplate spurring throughout the   cervical spine. Mild to moderate facet arthropathy throughout the   cervical spine. No high-grade spinal canal stenosis.         Radiation dose for this scan was reduced using automated exposure   control, adjustment of the mA and/or kV according to patient size, or   iterative reconstruction technique      BOBBI SELBY MD            SYSTEM ID:  [id number]      Head CT w/o contrast   Final Result   Abnormal   IMPRESSION:    1. Thin subdural hemorrhage along the right frontal convexity   measuring up to 1 cm maximum thickness resulting in 1-2 mm leftward   midline shift of the septum pellucidum.   2. Nondisplaced fracture through the  greater wing of the sphenoid on   the left extending up into the squamosal portion of the left temporal   bone.   3. Questionable fracture through the mastoid portion of the temporal   bone on the right. Dedicated thin section skull base CT recommended   for further evaluation.   4. Diffuse cerebral volume loss and cerebral white matter changes   consistent with chronic small vessel ischemic disease.       [Critical Result: Intracranial hemorrhage and skull fracture]      Finding was identified on 7/26/2023 3:45 PM.       LOLIS MOORE was contacted by Dr. Casanova on 7/26/2023 3:58 PM   and verbalized understanding of the critical result.             Radiation dose for this scan was reduced using automated exposure   control, adjustment of the mA and/or kV according to patient size, or   iterative reconstruction technique.      BOBBI CASANOVA MD            SYSTEM ID:  [id number]      Chest XR,  PA & LAT   Final Result   IMPRESSION: Normal size of cardiac silhouette. Right chest port with   tip overlying the cavoatrial junction. No definite airspace   consolidation, pleural effusion or pneumothorax. Tubular opacity   overlying the right axilla/chest wall, likely external to the patient.      KATHARINE WALKER MD            SYSTEM ID:  [id number]      CT Head w/o Contrast    (Results Pending)      Report per radiology    Laboratory:  Labs Ordered and Resulted from Time of ED Arrival to Time of ED Departure   COMPREHENSIVE METABOLIC PANEL - Abnormal       Result Value    Sodium 134 (*)     Potassium 3.2 (*)     Chloride 95 (*)     Carbon Dioxide (CO2) 25      Anion Gap 14      Urea Nitrogen 44.1 (*)     Creatinine 2.91 (*)     Calcium 8.2 (*)     Glucose 117 (*)     Alkaline Phosphatase 241 (*)     AST 26      ALT 25      Protein Total 5.5 (*)     Albumin 2.6 (*)     Bilirubin Total 0.4      GFR Estimate 25 (*)    CBC WITH PLATELETS AND DIFFERENTIAL - Abnormal    WBC Count 7.4      RBC Count 2.48 (*)     Hemoglobin 8.0  (*)     Hematocrit 23.4 (*)     MCV 94      MCH 32.3      MCHC 34.2      RDW 13.9      Platelet Count 347     DIFFERENTIAL - Abnormal    % Neutrophils 93      % Lymphocytes 2      % Monocytes 3      % Eosinophils 1      % Basophils 0      % Metamyelocytes 1      Absolute Neutrophils 6.9      Absolute Lymphocytes 0.1 (*)     Absolute Monocytes 0.2      Absolute Eosinophils 0.1      Absolute Basophils 0.0      Absolute Metamyelocytes 0.1 (*)     RBC Morphology Confirmed RBC Indices      Platelet Assessment        Value: Automated Count Confirmed. Platelet morphology is normal.   ROUTINE UA WITH MICROSCOPIC REFLEX TO CULTURE - Abnormal    Color Urine Yellow      Appearance Urine Clear      Glucose Urine Negative      Bilirubin Urine Negative      Ketones Urine Negative      Specific Gravity Urine 1.013      Blood Urine Small (*)     pH Urine 5.5      Protein Albumin Urine 50 (*)     Urobilinogen Urine Normal      Nitrite Urine Negative      Leukocyte Esterase Urine Negative      Bacteria Urine Few (*)     RBC Urine 2      WBC Urine 4      Hyaline Casts Urine 1     PROCALCITONIN - Abnormal    Procalcitonin 6.25 (*)    LACTIC ACID WHOLE BLOOD - Normal    Lactic Acid 0.9     ETHYL ALCOHOL LEVEL - Normal    Alcohol ethyl <0.01     INFLUENZA A/B, RSV, & SARS-COV2 PCR - Normal    Influenza A PCR Negative      Influenza B PCR Negative      RSV PCR Negative      SARS CoV2 PCR Negative     TYPE AND SCREEN, ADULT    ABO/RH(D) A NEG      Antibody Screen Negative      SPECIMEN EXPIRATION DATE [id number]     BLOOD CULTURE   BLOOD CULTURE   ABO/RH TYPE AND SCREEN      Emergency Department Course & Assessments:    Interventions:  Medications   vancomycin (VANCOCIN) 1,250 mg in 0.9% NaCl 250 mL intermittent infusion (1,250 mg Intravenous $New Bag 7/26/23 1747)   0.9% sodium chloride BOLUS (0 mLs Intravenous Stopped 7/26/23 1616)   acetaminophen (TYLENOL) tablet 1,000 mg (1,000 mg Oral $Given 7/26/23 1446)   sodium chloride 0.9%  infusion (0 mLs Intravenous Paused 7/26/23 1722)   levETIRAcetam (KEPPRA) intermittent infusion 500 mg (0 mg Intravenous Stopped 7/26/23 1653)   piperacillin-tazobactam (ZOSYN) 4.5 g vial to attach to  mL bag (0 g Intravenous Stopped 7/26/23 1752)   0.9% sodium chloride BOLUS (1,000 mLs Intravenous $New Bag 7/26/23 1722)     Assessments:  1530 I obtained history and examined patient as noted above.    Independent Interpretation (X-rays, CTs, rhythm strip):  Imaging independently reviewed and agree with radiologist interpretation.     Consultations/Discussion of Management or Tests:  ED Course as of 07/26/23 1840   Wed Jul 26, 2023   1606 Critical result: Subdural hemorrhage. Neurosurgery paged.   1603 I spoke with Radha Charles PA-C from Neurosurgery regarding this patient. Recommended Keppra 500 mg IV for seizure prophylaxis and repeat heat CT in 6 hours from initial with thin slices through the basilar area to assess for basilar skull fractures. Pt will be admitted.    1633 Spoke with Dr. Montenegro regarding admission of this patient. [place] [facility] [place]   1657 Radha JEREZ from Neurosurgery called, recommended also having hospitalist consult ENT regarding skull fractures.    1740 Patient's blood pressure has dropped to 88/57. Receiving second liter of IV fluids currently.   1758 I did speak with the patient's brother Mookie ([phone]) who states patient has been dizzy, weak, and had poor PO intake lately. He was updated on the results with patient's permission and would like further information as it comes back.     Social Determinants of Health affecting care:   Pt with general poor understanding of state of health.  Hard to discern whether this is baseline or due to new head bleed.     Disposition:  The patient was admitted to the hospital under the care of Dr. Montenegro.     Impression & Plan    Medical Decision Making:  This is a 52-year-old gentleman with throat cancer who was found in the  [employer] parking lot today, reportedly down for about 20 minutes, unclear if unconscious or not.  History as above.  Upon arrival, vital signs were notable for tachycardia and fever.  Based on vital signs alone, the patient did meet sepsis criteria, though not severe sepsis or septic shock.  IV fluids were initiated.  Labs and imaging obtained as above.  No leukocytosis or elevated lactic acid.  No source of a infection has been identified at this time.  Despite this, did initiate prophylactic antibiotics and fluid resuscitation.  Pressures had slowly been dropping in the emergency department.  He was given 2 L of IV fluid as well as maintenance fluids.  His blood pressure was reported hypotensive, but his heart rate did improve.  I suspect that his abnormal vital signs are more likely related to severe dehydration versus sepsis.    From a trauma standpoint, the patient does have a subdural hemorrhage on the right side as well as at least one skull fracture.  He does seem confused, though unclear if this is baseline or a new finding for him.  Neurosurgery was consulted and requested we start Keppra for seizure prophylaxis.  They do not feel that he currently needs surgery, though did recommend repeating head CT in 6 hours with thin slices through the basilar skull to assess for basilar skull fracture.  He does have quite severe neck pain, though this does appear to be acute on chronic.  No evidence of neck fractures on CT.  No evidence of other injury from the trauma.    I spoke with the hospitalist and at this point, it is unclear why the patient fell, there is no clear evidence on labs, imaging, or history.  He does have a hemoglobin of 8 with no clear explanation and most recent per [facility] was 10.7.  Last hemoglobin 11 months ago was 12.4, so this does appear to be a new anemia.  He also has an acute kidney injury.  Patient remained vitally stable while in the emergency department.  Plan will be for admission  to the [facility] [place] for frequent neurochecks, repeat 6 hour head CT, further septic work-up.  Neurosurgery also recommended ENT consult while patient is inpatient.    Diagnosis:    ICD-10-CM    1. Subdural hemorrhage (H)  I62.00       2. Acute kidney injury (H)  N17.9       3. Sepsis (H)  A41.9       4. Skull fracture (H)  S02.91XA       5. Anemia  D64.9         Scribe Disclosure:  Leigha ARVIZU & Meg Baum am serving as a scribe at 3:34 PM on 7/26/2023 to document services personally performed by Kamryn Yu PA-C based on my observations and the provider's statements to me.     7/26/2023   Kamryn Yu PA-C Romano, Amanda, PA-C  07/26/23 4460

## 2023-07-26 NOTE — PHARMACY-ADMISSION MEDICATION HISTORY
"Pharmacist Admission Medication History    Admission medication history is complete. The information provided in this note is only as accurate as the sources available at the time of the update.    Medication reconciliation/reorder completed by provider prior to medication history? No    Information Source(s): Patient, Clinic records, and CareEverywhere/SureScripts via in-person    Pertinent Information:   -unclear if patient is an accurate historian or not at this time  -amlodipine: filled 4/8/23 for 90 day supply. Patient reports this was stopped ~2 wks ago. No information in chart corroborating this. Marked as \"not taking\" but not removed from PTA list as unclear if this was supposed to be stopped.  -gabapentin: single Rx filled on 5/30/23, 100 mg capsules, #270 for 30 day supply with directions of \"take as instructed following titration schedule given to you.\" Conflicting information in chart/given by patient about dosing[facility] states pt is taking 4x/day and recommends patient decrease to 3x/day. Patient reports here that he is taking 2 capsules BID. Later in interview, stated taking TID. Questioned patient about which frequency he is taking and he changed his mind back to BID. States he had not been taking this regularly from the time of receiving initial Rx but has been for many weeks now and that is why he still has supply at home.   -olanzapine: on [facility] Everywhere as take 5 mg daily PRN for N/V/breakthrough N but pt reports taking this for sleep    Changes made to PTA medication list:  Added: cyclobenzaprine, ibuprofen, Tylenol Arthritis, olanzapine, ondansetron  Deleted: magic mouthwash, oxycodone, tadalafil  Changed: amlodipine (marked as not taking; see note above)    Medication Affordability:  Not including over the counter (OTC) medications, was there a time in the past 3 months when you did not take your medications as prescribed because of cost?: No    Allergies " reviewed with patient and updates made in EHR: yes, no changes    Medication History Completed By: Phuong Ortiz RPH 7/26/2023 4:02 PM    Prior to Admission medications    Medication Sig Last Dose Taking? Auth Provider Long Term End Date   Acetaminophen (TYLENOL ARTHRITIS PAIN PO) Take 2 tablets by mouth daily as needed (pain)  at PRN Yes Unknown, Entered By History     cyclobenzaprine (FLEXERIL) 10 MG tablet Take 10 mg by mouth 3 times daily as needed for muscle spasms 7/26/2023 at ~1200 Yes Unknown, Entered By History     gabapentin (NEURONTIN) 100 MG capsule Take 200 mg by mouth 2 times daily Med last filled 7/26/2023 at AM Yes Unknown, Entered By History Yes    ibuprofen (ADVIL/MOTRIN) 200 MG tablet Take 200 mg by mouth every 6 hours as needed for pain  at PRN Yes Unknown, Entered By History     lisinopril-hydrochlorothiazide (ZESTORETIC) 20-12.5 MG tablet Take 1 tablet by mouth daily 7/26/2023 at AM Yes Agustin Ortiz MD Yes    OLANZapine (ZYPREXA) 5 MG tablet Take 5 mg by mouth nightly as needed (sleep) Past Month at PRN Yes Unknown, Entered By History Yes    ondansetron (ZOFRAN ODT) 8 MG ODT tab Take 8 mg by mouth every 8 hours as needed for nausea or other (vomiting)  Yes Unknown, Entered By History     amLODIPine (NORVASC) 5 MG tablet TAKE 1 TABLET BY MOUTH EVERY DAY  Patient not taking: Reported on 7/26/2023 Not Taking at pt reports this was stopped ~2 wks ago  Agustin Ortiz MD Yes

## 2023-07-26 NOTE — ED NOTES
Bed: ED19  Expected date:   Expected time:   Means of arrival:   Comments:  [facility] - 525 - 52 m fall head injury neck pain eta 125

## 2023-07-26 NOTE — ED TRIAGE NOTES
Patient BIBA from the parking lot of [employer] where he was found down. Patient states that he passed out but it was unwitnessed. Patient states down for 20 min. Patient is oriented to self and place but not situation or date. Hx throat cancer. States neck pain that radiates down back into low back. ST. States fevers at home last few days.     Triage Assessment       Row Name 07/26/23 0919       Triage Assessment (Adult)    Airway WDL WDL       Respiratory WDL    Respiratory WDL WDL       Skin Circulation/Temperature WDL    Skin Circulation/Temperature WDL WDL       Cardiac WDL    Cardiac WDL --  hypotensive fpr medics       Peripheral/Neurovascular WDL    Peripheral Neurovascular WDL WDL       Cognitive/Neuro/Behavioral WDL    Cognitive/Neuro/Behavioral WDL --  confused

## 2023-07-26 NOTE — ED NOTES
[facility]  ED Nurse Handoff Report    ED Chief complaint: Fall      ED Diagnosis:   Final diagnoses:   None       Code Status: Full Code    Allergies: No Known Allergies    Patient Story:  Patient BIBA from the parking lot of [employer] where he was found down. Patient states that he passed out but it was unwitnessed. Patient states down for 20 min. Patient is oriented to self and place but not situation or date. Hx throat cancer. States neck pain that radiates down back into low back. ST. States fevers at home last few days.     Focused Assessment:    Patient A&Ox2. No cough or SOB upon assessment. Breathing rate, rhythm and SPO2 WDL. Denies chest pain. Sinus tach on monitor, rate 118. States neck pain and pain in head and radiating down his spine.     Labs Ordered and Resulted from Time of ED Arrival to Time of ED Departure   COMPREHENSIVE METABOLIC PANEL - Abnormal       Result Value    Sodium 134 (*)     Potassium 3.2 (*)     Chloride 95 (*)     Carbon Dioxide (CO2) 25      Anion Gap 14      Urea Nitrogen 44.1 (*)     Creatinine 2.91 (*)     Calcium 8.2 (*)     Glucose 117 (*)     Alkaline Phosphatase 241 (*)     AST 26      ALT 25      Protein Total 5.5 (*)     Albumin 2.6 (*)     Bilirubin Total 0.4      GFR Estimate 25 (*)    CBC WITH PLATELETS AND DIFFERENTIAL - Abnormal    WBC Count 7.4      RBC Count 2.48 (*)     Hemoglobin 8.0 (*)     Hematocrit 23.4 (*)     MCV 94      MCH 32.3      MCHC 34.2      RDW 13.9      Platelet Count 347     DIFFERENTIAL - Abnormal    % Neutrophils 93      % Lymphocytes 2      % Monocytes 3      % Eosinophils 1      % Basophils 0      % Metamyelocytes 1      Absolute Neutrophils 6.9      Absolute Lymphocytes 0.1 (*)     Absolute Monocytes 0.2      Absolute Eosinophils 0.1      Absolute Basophils 0.0      Absolute Metamyelocytes 0.1 (*)     RBC Morphology Confirmed RBC Indices      Platelet Assessment        Value: Automated Count Confirmed. Platelet morphology is  normal.   LACTIC ACID WHOLE BLOOD - Normal    Lactic Acid 0.9     ROUTINE UA WITH MICROSCOPIC REFLEX TO CULTURE   ETHYL ALCOHOL LEVEL   INFLUENZA A/B, RSV, & SARS-COV2 PCR   BLOOD CULTURE   BLOOD CULTURE       Head CT w/o contrast    (Results Pending)   Cervical spine CT w/o contrast    (Results Pending)   Chest XR,  PA & LAT    (Results Pending)         Treatments and/or interventions provided:  Medications   0.9% sodium chloride BOLUS (1,000 mLs Intravenous $New Bag 7/26/23 1450)   sodium chloride 0.9% infusion (has no administration in time range)   acetaminophen (TYLENOL) tablet 1,000 mg (1,000 mg Oral $Given 7/26/23 1446)       Patient's response to treatments and/or interventions:  Patient remains unchanged.     To be done/followed up on inpatient unit:   See any in-patient orders. Manage pain. Monitor fevers.     Does this patient have any cognitive concerns?: N/A    Activity level - Baseline/Home:    Independent    Activity Level - Current:    Stand with assist x2    Patient's Preferred language: English     Needed?: No    Isolation: None and COVID r/o and special precautions  Infection: Not Applicable  COVID r/o and special precautions  Patient tested for COVID 19 prior to admission: YES    Bariatric?: No    Vital Signs:   Vitals:    07/26/23 1329 07/26/23 1344 07/26/23 1359 07/26/23 1414   BP: 104/62  120/74    Pulse: 113 114 117 (!) 123   Resp: 13 17 12    Temp:       TempSrc:       SpO2: 99% 99%  95%   Weight:       Height:           Cardiac Rhythm:     Was the PSS-3 completed:   Yes  What interventions are required if any?                 Family Comments: N/A    OBS brochure/video discussed/provided to patient/family: No              Name of person given brochure if not patient: N/A              Relationship to patient: N/A    For the majority of the shift this patient's behavior was Green.  Behavioral interventions performed were N/A.    ED NURSE PHONE NUMBER: *[id number]

## 2023-07-26 NOTE — CONSULTS
Neurosurgery Consult    HPI    Mr. Fuentes is a 52-year-old male who presented to the emergency department after an unwitnessed fall via ambulance.  He is a unreliable historian, and has given multiple different accounts to different providers regarding the events surrounding his fall.    Head CT scan demonstrated in subdural hemorrhage along the right frontal convexity, measuring up to 1 cm in maximal thickness resulting 1 to 2 mm leftward midline shift.  Nondisplaced fracture through the greater wing of the sphenoid on the left extending into the squamous portion of the left temporal bone.  Questionable fracture through the mastoid portion of the temporal bone on the right.    Patient denies headache at this time.      Medical history  Oropharyngeal cancer  Alcohol dependence  Tobacco abuse  Hypertension  Acute kidney injury    Social history  Half a pack daily smoker for the past 30 years      B/P: 93/56, T: 99.7, P: 105, R: 13       Exam    Alert and oriented no acute distress  Moving all extremities  Finger-nose slow and accurate  Negative pronator drift  Extraocular movements intact  Pupils equal and reactive      Imaging    Head CT scan demonstrated    IMPRESSION:   1. Thin subdural hemorrhage along the right frontal convexity  measuring up to 1 cm maximum thickness resulting in 1-2 mm leftward  midline shift of the septum pellucidum.  2. Nondisplaced fracture through the greater wing of the sphenoid on  the left extending up into the squamosal portion of the left temporal  bone.  3. Questionable fracture through the mastoid portion of the temporal  bone on the right. Dedicated thin section skull base CT recommended  for further evaluation.  4. Diffuse cerebral volume loss and cerebral white matter changes  consistent with chronic small vessel ischemic disease.        Assessment    Status post unwitnessed fall  Subdural hemorrhage along the right frontal convexity, with 1 to 2 mm of midline shift  leftward.  Nondisplaced fractures of the greater wing of the sphenoid on the left, possible fracture to the mastoid portion of the temporal bone on the right.    Plan:      Recommend repeat head CT scan in 6 hours for stability.  Recommend thin slice skull base CT scan as recommended by radiology.  Keppra 500 mg twice daily for 7 days.  Blood pressure less than 150, head of bed 30 degrees.  The admission by the hospitalist, neurosurgery will follow up on head CT results.    Also recommend ENT evaluation of his skull fractures.    Discussed with Dr. Casarez.

## 2023-07-26 NOTE — ED PROVIDER NOTES
ED ATTENDING PHYSICIAN NOTE:   I evaluated this patient in conjunction with Kamryn Yu PA-C  I have participated in the care of the patient and personally performed key elements of the history, exam, and medical decision making.     HPI:   Iain Fuentes is a 52 year old male not on blood thinners who presents to the emergency department for evaluation after a fall. EMS reports that Will was found on the ground of a [employer] parking lot after an unwitnessed fall. He does not remember falling today nor why he fell today. He notes recurrent lightheadedness at baseline secondary to his medications. Denies fevers, cough, dysuria, leg swelling, and rashes.     Independent Historian:   None - Patient Only    Review of External Notes:   Reviewed primary care note from 5 days ago where patient was seen for neck pain.     EXAM:     Head:  Left-sided scalp contusion.  Eyes:  Conjunctivae are normal  Neck:  Trachea midline  CV:  Tachycardic, regular rhythm.  Resp:  No respiratory distress.  Lungs clear.  Musc:  Normal muscular tone    Normal range of motion shoulders, elbows, wrists, hips, knees, ankles.  Left elbow abrasion noted.  Neuro:  Speech is normal and fluent. Face is symmetric.  Equal  strength.  Normal DF/PF.      Independent Interpretation (X-rays, CTs, rhythm strip):  Initial head CT independently reviewed.  Right-sided subdural noted with mild shift.      Social Determinants of Health affecting care:   None     MEDICAL DECISION MAKING/ASSESSMENT AND PLAN:   Patient presents under uncertain circumstances from parking lot at [employer].  Very clearly he had a fall, but nature of fall is overall uncertain.  He arrives febrile and tachycardic.  Tachycardia has improved with fluid boluses.  Blood pressures are soft but stable with MAP greater than 65.  He has evidence of head trauma and a head CT confirms a subdural hematoma.  He is somewhat confused, but does not have any gross neurologic deficits.   Neurosurgery was contacted and plan is for an interval head CT.  No intervention is currently planned.  Lactic acid normal but procalcitonin is significantly elevated.  He has no leukocytosis, but does have a left shift on differential.  Concern present for sepsis of uncertain etiology.  Mcmanus/Zosyn initiated pending further infectious work-up.  Cultures pending.  Patient admitted under [facility] status under the care of Dr. Montenegro.  I did personally discuss case with Dr. Montenegro including soft blood pressures.     DIAGNOSIS:     ICD-10-CM    1. Subdural hemorrhage (H)  I62.00       2. Acute kidney injury (H)  N17.9       3. Sepsis (H)  A41.9       4. Skull fracture (H)  S02.91XA       5. Anemia  D64.9         DISPOSITION:   The patient was admitted to the hospital under the care of Dr. Montenegro.        Scribe Disclosure:  Leigha ARVIZU & Meg Baum, am serving as a scribe at 4:11 PM on 7/26/2023 to document services personally performed by Vincent Graham MD based on my observations and the provider's statements to me.        Vincent Graham MD  07/26/23 2025

## 2023-07-26 NOTE — H&P
[facility]    History and Physical  Hospitalist       Date of Admission:  7/26/2023    Assessment & Plan   Iain Fuentes is a 52 year old male who was brought to the emergency room after he was [place] [employer].    Unwitnessed fall  Possible syncope  Subdural hematoma with 1 to 2 mm leftward midline shift of the septum pellucidum   Nondisplaced skull fracture.  -The exact circumstances of the fall is unclear, subdural hematoma is probably result from the fall.  It appears like he might of syncopized  -CT head showed thin subdural hemorrhage along the right frontal convexity measuring up to 1 cm maximum with 1 to 2 mm leftward midline shift.  Also was found to have nondisplaced fracture of the skull.  -Admit to [facility]  -Telemetry  -Neurochecks every 2 hours  -Prophylactic Keppra 500 mg twice a day per Neurosurgery  -Repeat head CT in 6 hours  -Formal neurosurgery consult  -ENT consult at the request of neurosurgery    Rule out sepsis  -Patient presenting with fever and hypotension and has an elevated procalcitonin of 6.25  -Lactic acid normal at presentation  -Source of infection unclear  -UA is unremarkable  -Chest x-ray does not show any evidence of pneumonia  -Given his history of cancer, need to make sure there is no occult bacteremia  -Received a dose of vancomycin in the ER.  Will check nasal MRSA PCR.  If negative will discontinue vancomycin given renal failure.  -Continue Zosyn    Acute kidney injury  -Suspect this is prerenal, recent creatinine was normal, presents with a creatinine of 2.91  -Continue normal saline at 125 mL/h    Acute anemia  -Hemoglobin at presentation today is 8.0, last hemoglobin was 10.7 per scan notes from [facility]  -Unable to get further history regarding bleeding  -We will need more collateral information once patient is more improved from a mental state standpoint  -Recheck hemoglobin tomorrow morning    Essential hypertension  -Hold  "prior to admission antihypertensives given concern for sepsis and soft blood pressure.    Oropharyngeal cancer on active chemo/radiation  -Patient follows up with [facility], last note is from 2022.  Unclear what the current situation with the cancer is.  It appears like he is on chemo and radiation.  -We will consult [facility] in the morning.    Clinically Significant Risk Factors Present on Admission        # Hypokalemia: Lowest K = 3.2 mmol/L in last 2 days, will replace as needed       # Hypoalbuminemia: Lowest albumin = 2.6 g/dL at 7/26/2023  1:13 PM, will monitor as appropriate     # Hypertension: Noted on problem list      # Cachexia: Estimated body mass index is 16.62 kg/m  as calculated from the following:    Height as of this encounter: 1.676 m (5' 6\").    Weight as of this encounter: 46.7 kg (103 lb).              Medical Decision Making       **CLEAR ALL SELECTIONS**     75 MINUTES SPENT BY ME on the date of service doing chart review, history, exam, documentation & further activities per the note.     DVT Prophylaxis: Pneumatic Compression Devices  Code Status: Full Code    Disposition: Expected discharge in 2 + days    Sanya Montenegro MD, MD    Primary Care Physician   Abdiel Kelly    Chief Complaint   Found down    History is obtained from the patient    History of Present Illness   Iain Fuentes is a 52 year old male who presents to the emergency room after he was found down in the [employer] parking lot earlier today.  The exact circumstances of how he was is unclear.  It is unclear if patient syncopized or not.  Upon arrival to the emergency room he was found to be tachycardic febrile and hypotensive.  On questioning further patient does not recall the events.  He denies any specific complaints recently related to his health like nausea, vomiting or diarrhea.  He is unable to tell me if he has been eating and drinking adequately.  Patient does have a history of pharyngeal " cancer and follows up with [facility].  In the emergency room patient was found to have subdural hematoma with skull fracture.  Neurosurgery was consulted who recommended starting empiric Keppra, repeating head CT in 6 hours and also a ENT consult given his skull fracture.  Patient was also found to be febrile with concerns for sepsis given hypotension, tachycardia and was started on empiric antibiotics.  Unable to get further collateral information.  I tried calling Mookie who is the brother listed on the chart and was unable to reach him.  I did leave a voicemail requesting a call back.        Past Medical History    I have reviewed this patient's medical history and updated it with pertinent information if needed.   Past Medical History:   Diagnosis Date    Alcohol abuse     Hypertension     Lumbar herniated disc     Marijuana dependence (H)        Past Surgical History   I have reviewed this patient's surgical history and updated it with pertinent information if needed.  Past Surgical History:   Procedure Laterality Date    ARTHROSCOPY KNEE WITH MENISCUS ALLOGRAFT Left     EXCISE LESION INTRAORAL N/A 7/15/2022    Procedure: 1.  Direct laryngoscopy. 2.  Evaluation under anesthesia with biopsies;  Surgeon: Austin Kate MD;  Location: [facility] OR    IR CHEST PORT PLACEMENT > 5 YRS OF AGE  8/23/2022    [facility] NONSPECIFIC PROCEDURE  1997    facial surgery/repair after trauma       Prior to Admission Medications   Prior to Admission Medications   Prescriptions Last Dose Informant Patient Reported? Taking?   Acetaminophen (TYLENOL ARTHRITIS PAIN PO)  at PRN Self Yes Yes   Sig: Take 2 tablets by mouth daily as needed (pain)   OLANZapine (ZYPREXA) 5 MG tablet Past Month at PRN Self Yes Yes   Sig: Take 5 mg by mouth nightly as needed (sleep)   amLODIPine (NORVASC) 5 MG tablet Not Taking at pt reports this was stopped ~2 wks ago Self No No   Sig: TAKE 1 TABLET BY MOUTH EVERY DAY   Patient not taking: Reported on  2023   cyclobenzaprine (FLEXERIL) 10 MG tablet 2023 at ~1200 Self Yes Yes   Sig: Take 10 mg by mouth 3 times daily as needed for muscle spasms   gabapentin (NEURONTIN) 100 MG capsule 2023 at AM Self Yes Yes   Sig: Take 200 mg by mouth 2 times daily Med last filled   ibuprofen (ADVIL/MOTRIN) 200 MG tablet  at PRN Self Yes Yes   Sig: Take 200 mg by mouth every 6 hours as needed for pain   lisinopril-hydrochlorothiazide (ZESTORETIC) 20-12.5 MG tablet 2023 at AM Self No Yes   Sig: Take 1 tablet by mouth daily   ondansetron (ZOFRAN ODT) 8 MG ODT tab  Self Yes Yes   Sig: Take 8 mg by mouth every 8 hours as needed for nausea or other (vomiting)      Facility-Administered Medications: None     Allergies   No Known Allergies    Social History   I have reviewed this patient's social history and updated it with pertinent information if needed. Iain Fuentes  reports that he has been smoking cigarettes. He started smoking about 37 years ago. He has a 20.00 pack-year smoking history. He has never used smokeless tobacco. He reports current alcohol use. He reports current drug use. Drug: Marijuana.    Family History   I have reviewed this patient's family history and updated it with pertinent information if needed.   Family History   Problem Relation Age of Onset    Arthritis Father     Blood Disease Father         amyloidosis    Depression Mother         committed suicide     Hypertension Brother     Cancer Paternal Grandmother          of lung cancer    Gastrointestinal Disease Paternal Grandfather         pancreatic disorder       Review of Systems   The 10 point Review of Systems is negative other than noted in the HPI or here.     Physical Exam   Temp: 99.7  F (37.6  C) Temp src: Oral BP: 93/57 Pulse: 101   Resp: 14 SpO2: 97 % O2 Device: None (Room air)    Vital Signs with Ranges  Temp:  [99.7  F (37.6  C)-101.6  F (38.7  C)] 99.7  F (37.6  C)  Pulse:  [101-126] 101  Resp:  [10-18] 14  BP:  ()/(56-74) 93/57  SpO2:  [95 %-99 %] 97 %  103 lbs 0 oz    Gen: AAOX1-2, NAD, confused, thinly built  HEENT: Bruise over left forehead  Neck: Supple neck, good ROM, no stridor  Resp: CTA B/L, normal WOB; no crackles; no wheezes  CVS- RRR, no murmur, no edema  Abd/GI: Soft, non-tender. BS- normoactive  Skin- Warm, dry, no rashes  MSK: No joint deformity; no edema  Neuro- CN- intact. Moving X 4; No focal deficits.     Data   Data reviewed today:  I personally reviewed .        Recent Labs   Lab 07/26/23  1313   WBC 7.4   HGB 8.0*   MCV 94      *   POTASSIUM 3.2*   CHLORIDE 95*   CO2 25   BUN 44.1*   CR 2.91*   ANIONGAP 14   TOSHA 8.2*   *   ALBUMIN 2.6*   PROTTOTAL 5.5*   BILITOTAL 0.4   ALKPHOS 241*   ALT 25   AST 26       Recent Results (from the past 24 hour(s))   Chest XR,  PA & LAT    Narrative    XR CHEST 2 VIEWS   7/26/2023 3:24 PM     HISTORY: fever, sepsis    COMPARISON: None.      Impression    IMPRESSION: Normal size of cardiac silhouette. Right chest port with  tip overlying the cavoatrial junction. No definite airspace  consolidation, pleural effusion or pneumothorax. Tubular opacity  overlying the right axilla/chest wall, likely external to the patient.    KATHARINE WALKER MD         SYSTEM ID:  [id number]   Head CT w/o contrast   Result Value    Radiologist flags Intracranial hemorrhage and skull fracture (AA)    Narrative    CT OF THE HEAD WITHOUT CONTRAST  7/26/2023 3:41 PM     COMPARISON: None.    HISTORY: Fall, head trauma, confusion.    TECHNIQUE: 5 mm thick axial CT images of the head were acquired  without IV contrast material.    FINDINGS: There is a thin lobulated hyperdense subdural hemorrhage  along the lateral right frontal convexity measuring up to 1 cm maximum  thickness. Given the patient's cerebral volume loss, this does not  result in any significant effacement of the basal cisterns but does  result in 1-2 mm leftward midline shift of the septum pellucidum.  There  is mild diffuse cerebral volume loss. There are subtle patchy  areas of decreased density in the cerebral white matter bilaterally  that are consistent with sequela of chronic small vessel ischemic  disease. The ventricles and basal cisterns are within normal limits in  configuration given the degree of cerebral volume loss.     No intracranial mass or recent infarct.    The visualized paranasal sinuses are well-aerated. There is no  mastoiditis. There is a nondisplaced fracture of the lateral aspect of  the greater sphenoid wing on the left extending up into the squamosal  portion of the left temporal bone. There is gas in the inferotemporal  fossa on the right of uncertain origin. There is a questionable  fracture through the mastoid portion of the temporal bone on the right  on the coronal images that could be associated with this soft tissue  gas.      Impression    IMPRESSION:   1. Thin subdural hemorrhage along the right frontal convexity  measuring up to 1 cm maximum thickness resulting in 1-2 mm leftward  midline shift of the septum pellucidum.  2. Nondisplaced fracture through the greater wing of the sphenoid on  the left extending up into the squamosal portion of the left temporal  bone.  3. Questionable fracture through the mastoid portion of the temporal  bone on the right. Dedicated thin section skull base CT recommended  for further evaluation.  4. Diffuse cerebral volume loss and cerebral white matter changes  consistent with chronic small vessel ischemic disease.     [Critical Result: Intracranial hemorrhage and skull fracture]    Finding was identified on 7/26/2023 3:45 PM.     LOLIS CLAYTON OSCAR was contacted by Dr. Casanova on 7/26/2023 3:58 PM  and verbalized understanding of the critical result.         Radiation dose for this scan was reduced using automated exposure  control, adjustment of the mA and/or kV according to patient size, or  iterative reconstruction technique.    BOBBI CASANOVA MD          SYSTEM ID:  [id number]   Cervical spine CT w/o contrast    Narrative    CT OF THE CERVICAL SPINE WITHOUT CONTRAST   7/26/2023 3:42 PM     COMPARISON: None    HISTORY: Fall, midline C-spine pain.     TECHNIQUE: Axial images of the cervical spine were acquired without  intravenous contrast. Multiplanar reformations were created.        Impression    IMPRESSION: Mild degenerative anterolisthesis of C2 upon C3 and C3  upon C4. Mild degenerative retrolisthesis of C4 upon C5. Alignment of  the cervical vertebrae is otherwise normal; however, there is reversal  of normal cervical lordosis centered at the T4 level. Vertebral body  heights of the cervical spine are normal. Craniocervical alignment is  normal. There are no fractures of the cervical spine. Loss of disc  space height and degenerative endplate spurring throughout the  cervical spine. Mild to moderate facet arthropathy throughout the  cervical spine. No high-grade spinal canal stenosis.      Radiation dose for this scan was reduced using automated exposure  control, adjustment of the mA and/or kV according to patient size, or  iterative reconstruction technique    BOBBI SELBY MD         SYSTEM ID:  [id number]

## 2023-07-27 NOTE — PLAN OF CARE
Reason for Admission: R frontal SDH and L temporal bone fracture s/p fall   Cognitive/Mentation: A/Ox 3, disoriented to situation  Neuros/CMS: Stable with generalized weakness, 4/5 strength. Slightly slurred speech.    VS: VSS on RA, SBP <150     Tele: NSR   GI: BS audible, + flatus, no BM. Continent.  : Voiding adequately. Continent.  Pulmonary: LS clear  Pain: Consistent L neck pain, prn Tylenol and Oxycodone given.   Drains/Lines: L PIV, infusing NS at 125 mL/hr. R port not accessed.   Skin: Intact with L proximal forehead abrasion, L elbow/shoulder abrasions and scabbing. Scattered bruising.   Activity: Assist x 1 with GB.  Diet: Clear liquid diet with thin liquids. Takes pills crushed.   Therapies recs: Pending  Discharge: Pending  Aggression Stoplight Tool: Green  End of shift summary: No changes this shift. Repeat CT completed. Neurosurg, Oncology, ENT consults. HOB 30.

## 2023-07-27 NOTE — PHARMACY-VANCOMYCIN DOSING SERVICE
"Pharmacy Vancomycin Initial Note  Date of Service 2023  Patient's  1970  52 year old, male    Indication: Sepsis    Current estimated CrCl = Estimated Creatinine Clearance: 19.6 mL/min (A) (based on SCr of 2.91 mg/dL (H)).    Creatinine for last 3 days  2023:  1:13 PM Creatinine 2.91 mg/dL    Recent Vancomycin Level(s) for last 3 days  No results found for requested labs within last 3 days.      Vancomycin IV Administrations (past 72 hours)                     vancomycin (VANCOCIN) 1,250 mg in 0.9% NaCl 250 mL intermittent infusion (mg) 1,250 mg New Bag 23 1748                    Nephrotoxins and other renal medications (From now, onward)      Start     Dose/Rate Route Frequency Ordered Stop    23 1730  vancomycin (VANCOCIN) 500 mg vial to attach to  mL bag         500 mg  over 1 Hours Intravenous EVERY 24 HOURS 23 2144      23 0100  piperacillin-tazobactam (ZOSYN) 2.25 g vial to attach to  ml bag        Note to Pharmacy: For [facility], [facility] and [facility]: For Zosyn-naive patients, use the \"Zosyn initial dose + extended infusion\" order panel.    2.25 g  over 30 Minutes Intravenous EVERY 6 HOURS 23 2114              Contrast Orders - past 72 hours (72h ago, onward)      None            [employer] Prediction of Planned Initial Vancomycin Regimen  Regimen: 500 mg IV every 24 hours.  Exposure target: AUC24 (range)400-600 mg/L.hr   AUC24,ss: 461 mg/L.hr  Probability of AUC24 > 400: 67 %  Ctrough,ss: 15.8 mg/L  Probability of Ctrough,ss > 20: 25 %  Probability of nephrotoxicity (Lodise SAMMY ): 11 %          Plan:  Start vancomycin  500 mg IV q24h.   Vancomycin monitoring method: AUC  Vancomycin therapeutic monitoring goal: 400-600 mg*h/L  Pharmacy will check vancomycin levels as appropriate in 1-3 Days.    Serum creatinine levels will be ordered daily for the first week of therapy and at least twice weekly for subsequent weeks.      Jaki Gonzalez [facility]    "

## 2023-07-27 NOTE — PROGRESS NOTES
ENT consult was requested, chart reviewed. The patient presented with posttraumatic subdural hematoma with nondisplaced fracture of the greater wing of the sphenoid bone and possible extension to the squamous temporal bone. The patient without any ear discharge or other ear complaints, no facial paralysis. No facial bone fractures identified on that head CT. Images through the temporal bones are very limited, but no fluid identified in the mastoid or middle ear.    The patient is also known to our service based on his diagnosis of oropharyngeal cancer. The patient completed chemoradiation in the fall for unresectable cancer with good clinical response. He persisted with nonhealing ulcer in his oropharynx and was seen by Dr. Madison in this clinic two months ago. This was thought to be noncancerous based on the PET CT and clinical examination, although the patient declined biopsy. The patient opted to proceed with hyperbaric oxygen therapy but was having difficulties getting in. The patient currently has no airway issues but persistent trouble swallowing.    Discussed the patient's situation with Dr. Meza over the phone. The patient used to have a G-tube for non oral nutrition but unclear when this was discontinued and by whom. Agree with bedside swallowing assessment and possible replacement of the G-tube/PEG tube if appropriate. The patient can otherwise follow-up with Dr. Madison for reexamination of the oropharyngeal ulcer as an outpatient, unless any acute changes in the meantime.    With regards to his skull base fractures, more detailed imaging with a CAT scan is necessary to define any need for intervention. Recommend temporal bone CT as well as maxillofacial CT as the next step. Will be ordered by primary service, they will advice when these are done.

## 2023-07-27 NOTE — PROGRESS NOTES
Neurosurgery progress note    Patient reports headache.  Head CT scan with subtle increase in thickness of the right convexity subdural hematoma anteriorly.    Exam    Alert and oriented no acute distress  Moving all extremities  Finger-nose slow and accurate  Negative pronator drift  Extraocular movements intact  Pupils equal and reactive    Imaging    IMPRESSION:  1.  Question subtle increase in thickness of the right convexity subdural hematoma anteriorly. Remainder of the subdural hematoma is stable in thickness with mild increase in density.     2.  No significant mass effect or new hemorrhage.     3.  Mild increase in paranasal sinus fluid levels.    Assessment    Status post unwitnessed fall  Subdural hemorrhage along the right frontal convexity, with 1 to 2 mm of midline shift leftward.  Nondisplaced fractures of the greater wing of the sphenoid on the left, possible fracture to the mastoid portion of the temporal bone on the right.     Plan    Repeat head CT scan this afternoon for stability.  Keppra 500 twice daily for 7 days  Consults from ENT and oncology pending

## 2023-07-27 NOTE — PLAN OF CARE
"/73 (BP Location: Left arm)   Pulse 93   Temp 97.5  F (36.4  C) (Oral)   Resp 16   Ht 1.676 m (5' 6\")   Wt 46.7 kg (103 lb)   SpO2 97%   BMI 16.62 kg/m      Patient name: Iain Fuentes    Nursing shift note  Summary: Patient found [place] in [employer] parking lot with subdural hematoma and signs of sepsis  Alertness/orientation: Alert, disoriented to place and situation  Neuro: No focal deficits, generalized weakness  Cardiac: Tachicardia, nsr  Resp: WDL  GI: Active bowel sounds, not passing gas  : Voiding   Mobility: A1 gb walker  CMS: Intact  Pain: Reports head and neck pain 8/10, helped with dilaudid, tylenol and oxycodone  Takes meds: Crushed with applesauce  Skin: Abrasions on head and elbow  Plan: Pending workup  Hygiene: Patient  Other Important Info: Unfortunately the tourniquet was left on after placing an IV, and CT reported some discoloration during his scan. Upon arrival back to the unit, the arm had good circulation, no discoloration and the patient didn't report any discomfort, numbness, or tingling.    Update: Patient's IV appeared to have infiltrated post potassium administration, with swelling noted around antecubital site and elbow. House NP notified who gave us orders to elevate and ice the site, and report any improvement. From now on we will use the patient's port for IV access. Patient has good CMS, and reports no pain.     Charting:  Hourly rounding Yes  Electrolyte Protocols Potassium replaced  DVT prophylaxis PCD pumps in place    Corey Enciso RN  [facility]  [phone]    "

## 2023-07-27 NOTE — CONSULTS
"CLINICAL NUTRITION SERVICES  -  ASSESSMENT NOTE      RECOMMENDATIONS FOR MD/PROVIDER TO ORDER: Recommend SLP eval     Patient would benefit from FT due to presumed poor oral intake and swallowing issues with very low BMI    Malnutrition: % Weight Loss:  Up to 20% in 1 year (moderate malnutrition)  % Intake:  Unable to determine but strongly suspected   Subcutaneous Fat Loss:  Orbital region severe depletion, Upper arm region severe depletion, and Thoracic region severe depletion  Muscle Loss:  Temporal region severe depletion, Clavicle bone region severe depletion, Acromion bone region severe depletion, Dorsal hand region severe depletion, Patellar region severe depletion, Anterior thigh region severe depletion, and Posterior calf region severe depletion  Fluid Retention:  None noted    Malnutrition Diagnosis: Severe malnutrition  In Context of:  Acute illness or injury  Chronic illness or disease        REASON FOR ASSESSMENT  Iain Fuentes is a 52 year old male seen by Registered Dietitian for Admission Nutrition Risk Screen for positive  Have you recently lost weight without trying? - Yes, 24-33#  Have you eaten poorly because of a decreased appetite? - Yes       NUTRITION HISTORY  - Information obtained from patient although seemed very unreliable.  He was extremely lethargic during my visit and fell asleep several times during questioning.  He was inconsistent with his answers as well as he told me that he eats a lot of salads at home but then also indicated that he eats blenderized foods (baby food consistency).  He did state that he drinks one bottle of Ensure per day.  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  Patient was able to confirm this and notes it was \"years ago\".      CURRENT NUTRITION ORDERS  Diet Order:     NPO " "  Noted ENT recommending SLP annabella   RN noted coughing with po liquids     Current Intake/Tolerance:  N/A      NUTRITION FOCUSED PHYSICAL ASSESSMENT FOR DIAGNOSING MALNUTRITION)  Yes          Observed:    Muscle wasting (refer to documentation in Malnutrition section) and Subcutaneous fat loss (refer to documentation in Malnutrition section)    Obtained from Chart/Interdisciplinary Team:  Chronically-ill appearing     ANTHROPOMETRICS  Height: 5' 6\"  Weight: 46.7 kg (103#)(7/26)Body mass index is 16.62 kg/m .  Weight Status:  Underweight BMI <18.5  IBW: 64.5 kg   % IBW: 72%  Weight History:   Wt Readings from Last 10 Encounters:   07/26/23 46.7 kg (103 lb)   08/23/22 49.9 kg (110 lb)   07/14/22 53.2 kg (117 lb 3.2 oz)   07/13/22 54.9 kg (121 lb)   07/11/22 54.9 kg (121 lb)   02/25/21 64.5 kg (142 lb 4.8 oz)   04/23/20 62.6 kg (138 lb)   04/30/19 67.1 kg (148 lb)   08/06/18 68.3 kg (150 lb 8 oz)   06/01/18 69.2 kg (152 lb 8 oz)     Down 7# or 6% over the last year     LABS  K 3.0 (L), Mg 1.6 (L), Phos NL     MEDICATIONS   mL/hr      ASSESSED NUTRITION NEEDS PER APPROVED PRACTICE GUIDELINES:    Dosing Weight 46.7 kg   Estimated Energy Needs: 2133-2066 kcals (35-40 Kcal/Kg)  Justification: repletion and underweight  Estimated Protein Needs:  grams protein (1.5-2 g pro/Kg)  Justification: repletion and hypercatabolism with acute illness  Estimated Fluid Needs: 5806-7220 mL (1 mL/Kcal)  Justification: maintenance    MALNUTRITION:  % Weight Loss:  Up to 20% in 1 year (moderate malnutrition)  % Intake:  Unable to determine but strongly suspected   Subcutaneous Fat Loss:  Orbital region severe depletion, Upper arm region severe depletion, and Thoracic region severe depletion  Muscle Loss:  Temporal region severe depletion, Clavicle bone region severe depletion, Acromion bone region severe depletion, Dorsal hand region severe depletion, Patellar region severe depletion, Anterior thigh region severe depletion, and " Posterior calf region severe depletion  Fluid Retention:  None noted    Malnutrition Diagnosis: Severe malnutrition  In Context of:  Acute illness or injury  Chronic illness or disease    NUTRITION DIAGNOSIS:  Inadequate protein-energy intake related to NPO with potential swallowing issues as evidenced by meeting 0% needs       NUTRITION INTERVENTIONS  Recommendations / Nutrition Prescription  Recommend SLP eval     Patient would benefit from FT due to presumed poor oral intake and swallowing issues with very low BMI     Implementation  Nutrition education: Not appropriate at this time due to patient condition  Collaboration and Referral of Nutrition care:  Bedside RN present during my evaluation.     Nutrition Goals  Patient will advance diet in the next 24 hours (vs start TF)    MONITORING AND EVALUATION:  Progress towards goals will be monitored and evaluated per protocol and Practice Guidelines    Lexy Royal RD, LD, CNSC   Clinical Dietitian - [facility]

## 2023-07-27 NOTE — PROGRESS NOTES
"MD Notification    Notified Person: MD    Notified Person Name: Howard Gordillo    Notification Date/Time: 22:39 PM 7/26/23    Notification Interaction: [employer] Messaging    Purpose of Notification: \"Pt in consistent 8-9/10 neck pain, with constant moaning and yelling out. Only has prn Tylenol, order for Oxycodone or Dilaudid?\"    Orders Received: One time dose Oxycodone 5 mg    Comments:    "

## 2023-07-27 NOTE — PROGRESS NOTES
[facility]    Medicine Progress Note - Hospitalist Service    Date of Admission:  7/26/2023    Assessment & Plan   Iain Fuentes is a 52 year old male who was brought to the emergency room after he was found [place] outside [employer].     Unwitnessed fall  Possible syncope  Subdural hematoma with 1 to 2 mm leftward midline shift of the septum pellucidum   Nondisplaced skull fracture.  Patient found down at [employer] and brought in by EMS. Patient cannot elaborate on specifics but does report he often gets dizzy and light headed. CT head showed thin subdural hemorrhage along the right frontal convexity measuring up to 1 cm maximum with 1 to 2 mm leftward midline shift.  Also was found to have nondisplaced fracture of the skull.    - repeat head head CT this AM mostly unchanged with maybe small interval increase in hematoma. Appreciate follow up from neurosurgery  - continue q2h neuro checks for now and keppra ppx for now per neurosurgery  - BP goal <150  - ENT consulted for facial fractures     Squamous cell carcinoma of posterior pharynx  Severe malnutrition  Definitve diagnosis by biopsy 07/15/2022.   Currently undergoing chemo and radiation with [facility] however last clinic note I can see is from April. Note states PEG tube in place however patient without PEG tube. Unclear exactly what is going on and patient is poor historian.  - will need to gather more collateral information today on status of patient's cancer  - [facility] consulted here  - currently NPO however will need to determine nutrition plan.     Sepsis unclear etiology  -Patient presented with fever and hypotension with normal WBC count but elevated procal. Normal lactate. UA clear and CXR clear.    - Source of infection unclear but suspecting something related to his cancer at the moment  - vitally improved today but WBC elevated to 12.6  - continue to trend cultures  - would like to get some imaging of his neck to rule  "out abscess etc however will wait on ENT and oncology assessments first     Acute kidney injury  -Suspect this is prerenal, recent creatinine was normal, presents with a creatinine of 2.91  -Continue normal saline at 125 mL/h  - BMP pending     Acute anemia  -Hemoglobin at presentation today is 8.0, last hemoglobin was 10.7 per scan notes from [facility]  -Unable to get further history regarding bleeding  - Hb stable    Hypokalemia  RN protocol  - Check Mg and Phos     Essential hypertension  -Hold prior to admission antihypertensives given concern for sepsis and soft blood pressure.         Diet: NPO for Medical/Clinical Reasons Except for: Meds    DVT Prophylaxis: Pneumatic Compression Devices  King Catheter: Not present  Lines: PRESENT             Cardiac Monitoring: ACTIVE order. Indication: Tachyarrhythmias, acute (48 hours)  Code Status: Full Code      Clinically Significant Risk Factors Present on Admission        # Hypokalemia: Lowest K = 3.2 mmol/L in last 2 days, will replace as needed       # Hypoalbuminemia: Lowest albumin = 2.6 g/dL at 7/26/2023  1:13 PM, will monitor as appropriate     # Hypertension: Noted on problem list      # Cachexia: Estimated body mass index is 16.62 kg/m  as calculated from the following:    Height as of this encounter: 1.676 m (5' 6\").    Weight as of this encounter: 46.7 kg (103 lb).            Disposition Plan      Expected Discharge Date: 07/28/2023                  Lady Meza DO  Hospitalist Service  [facility]  Securely message with [employer] (more info)  Text page via [employer] Paging/Directory   ______________________________________________________________________    Interval History   Met patient today for the first time. Long discussion with him at bedside with nursing staff. He is oriented but also poor historian and is really unable to provider further history. Does tell me when he has to \"go\" it gets bad and then clarifies this " means to the bathroom. He appears caachetic    Physical Exam   Vital Signs: Temp: 98.6  F (37  C) Temp src: Oral BP: 118/73 Pulse: 93   Resp: 16 SpO2: 97 % O2 Device: None (Room air)    Weight: 103 lbs 0 oz    Constitutional: Awake, alert, cooperative, no apparent distress, frail  HEENT: noted laceration to L scalp, L neck and submandle area  are swollen there is dried blood in mouth, unable to see oropharynx  Cardiovascular: no edema  GI: Normal bowel sounds, soft, non-distended, non-tender  Skin/Integumen: No rashes, no cyanosis,  Other:     Medical Decision Making       >60 MINUTES SPENT BY ME on the date of service doing chart review, history, exam, documentation & further activities per the note.      Data     I have personally reviewed the following data over the past 24 hrs:    12.6 (H)  \   8.0 (L)   / 333     134 (L) 95 (L) 44.1 (H) /  117 (H)   3.2 (L) 25 2.91 (H) \     ALT: 25 AST: 26 AP: 241 (H) TBILI: 0.4   ALB: 2.6 (L) TOT PROTEIN: 5.5 (L) LIPASE: N/A     Procal: 6.25 (HH) CRP: N/A Lactic Acid: 0.9         Imaging results reviewed over the past 24 hrs:   Recent Results (from the past 24 hour(s))   Chest XR,  PA & LAT    Narrative    XR CHEST 2 VIEWS   7/26/2023 3:24 PM     HISTORY: fever, sepsis    COMPARISON: None.      Impression    IMPRESSION: Normal size of cardiac silhouette. Right chest port with  tip overlying the cavoatrial junction. No definite airspace  consolidation, pleural effusion or pneumothorax. Tubular opacity  overlying the right axilla/chest wall, likely external to the patient.    KATHARINE WALKER MD         SYSTEM ID:  [id number]   Head CT w/o contrast   Result Value    Radiologist flags Intracranial hemorrhage and skull fracture (AA)    Narrative    CT OF THE HEAD WITHOUT CONTRAST  7/26/2023 3:41 PM     COMPARISON: None.    HISTORY: Fall, head trauma, confusion.    TECHNIQUE: 5 mm thick axial CT images of the head were acquired  without IV contrast material.    FINDINGS: There is a  thin lobulated hyperdense subdural hemorrhage  along the lateral right frontal convexity measuring up to 1 cm maximum  thickness. Given the patient's cerebral volume loss, this does not  result in any significant effacement of the basal cisterns but does  result in 1-2 mm leftward midline shift of the septum pellucidum.  There is mild diffuse cerebral volume loss. There are subtle patchy  areas of decreased density in the cerebral white matter bilaterally  that are consistent with sequela of chronic small vessel ischemic  disease. The ventricles and basal cisterns are within normal limits in  configuration given the degree of cerebral volume loss.     No intracranial mass or recent infarct.    The visualized paranasal sinuses are well-aerated. There is no  mastoiditis. There is a nondisplaced fracture of the lateral aspect of  the greater sphenoid wing on the left extending up into the squamosal  portion of the left temporal bone. There is gas in the inferotemporal  fossa on the right of uncertain origin. There is a questionable  fracture through the mastoid portion of the temporal bone on the right  on the coronal images that could be associated with this soft tissue  gas.      Impression    IMPRESSION:   1. Thin subdural hemorrhage along the right frontal convexity  measuring up to 1 cm maximum thickness resulting in 1-2 mm leftward  midline shift of the septum pellucidum.  2. Nondisplaced fracture through the greater wing of the sphenoid on  the left extending up into the squamosal portion of the left temporal  bone.  3. Questionable fracture through the mastoid portion of the temporal  bone on the right. Dedicated thin section skull base CT recommended  for further evaluation.  4. Diffuse cerebral volume loss and cerebral white matter changes  consistent with chronic small vessel ischemic disease.     [Critical Result: Intracranial hemorrhage and skull fracture]    Finding was identified on 7/26/2023 3:45 PM.      LOLIS CLAYTON OSCAR was contacted by Dr. Casanova on 7/26/2023 3:58 PM  and verbalized understanding of the critical result.         Radiation dose for this scan was reduced using automated exposure  control, adjustment of the mA and/or kV according to patient size, or  iterative reconstruction technique.    BOBBI CASANOVA MD         SYSTEM ID:  [id number]   Cervical spine CT w/o contrast    Narrative    CT OF THE CERVICAL SPINE WITHOUT CONTRAST   7/26/2023 3:42 PM     COMPARISON: None    HISTORY: Fall, midline C-spine pain.     TECHNIQUE: Axial images of the cervical spine were acquired without  intravenous contrast. Multiplanar reformations were created.        Impression    IMPRESSION: Mild degenerative anterolisthesis of C2 upon C3 and C3  upon C4. Mild degenerative retrolisthesis of C4 upon C5. Alignment of  the cervical vertebrae is otherwise normal; however, there is reversal  of normal cervical lordosis centered at the T4 level. Vertebral body  heights of the cervical spine are normal. Craniocervical alignment is  normal. There are no fractures of the cervical spine. Loss of disc  space height and degenerative endplate spurring throughout the  cervical spine. Mild to moderate facet arthropathy throughout the  cervical spine. No high-grade spinal canal stenosis.      Radiation dose for this scan was reduced using automated exposure  control, adjustment of the mA and/or kV according to patient size, or  iterative reconstruction technique    BOBBI CASANOVA MD         SYSTEM ID:  [id number]   CT Head w/o Contrast    Narrative    EXAM: CT HEAD W/O CONTRAST  LOCATION: [facility]  DATE: 7/26/2023    INDICATION: Known R subdural hemorrhage, 6 hr scan.  COMPARISON: 07/26/2023 head CT  TECHNIQUE: Routine CT Head without IV contrast. Multiplanar reformats. Dose reduction techniques were used.    FINDINGS:  INTRACRANIAL CONTENTS: Right convexity subdural hematoma is overall stable in thickness  apart from question subtle increase anteriorly along the right frontal lobe. No significant mass effect or midline shift. No new hemorrhage.  No CT evidence of acute   infarct. Normal parenchymal attenuation. Normal ventricles and sulci.     VISUALIZED ORBITS/SINUSES/MASTOIDS: No intraorbital abnormality.    Left maxillary sinus fluid levels slightly increased compared to the prior study. No middle ear or mastoid effusion.    BONES/SOFT TISSUES: No acute abnormality.      Impression    IMPRESSION:  1.  Question subtle increase in thickness of the right convexity subdural hematoma anteriorly. Remainder of the subdural hematoma is stable in thickness with mild increase in density.    2.  No significant mass effect or new hemorrhage.    3.  Mild increase in paranasal sinus fluid levels.

## 2023-07-27 NOTE — PLAN OF CARE
Goal Outcome Evaluation:      Plan of Care Reviewed With: patient (Bedside RN)          Outcome Evaluation: Patient NPO -- recommend SLP eval (patient would also benefit from FT d/t presumed poor intake and low BMI).  See RN note dated today for details.    Lexy Royal, RD, LD, CNSC   Clinical Dietitian - [facility]

## 2023-07-28 NOTE — PLAN OF CARE
Goal Outcome Evaluation:         Shift: 7/28/2023 - 07:00-19:30    Orientation: A&Ox2 -- disoriented to time and situation; agitated and anxious throughout shift    Vitals/Tele: VSS on RA; SBP<150; tele NSR; consistent pain throughout pain with verbal exclamations -- receiving Dilaudid 0.3mg Q2h PRN -- see eMAR.     IV Access/drain: Port to right chest wall infusing NS @ 125 ml/hr    Diet: NPO ex. meds    Mobility: Ax1 gb    GI/: voids in urinal at bedside w/ adequate output     Wound/Skin: scattered bruising; abrasions to left scalp, elbow, and shoulder from fall PTA    Consults: Hospitalist, Neurosurgery, ENT, and Oncology all following    Discharge Plan: TBD    Shift Note: Repeated head CT completed this shift. MRI of brain and neck completed this shift -- results pending. MARIAMA neuro checks at 14:00 and 16:00, as patient was in pain and unable to follow directions. On K, Mg, and Ph protocols -- K and Mg replaced -- awaiting recheck results. Ph recheck ordered for tomorrow AM.       See Flow sheets for assessment

## 2023-07-28 NOTE — PHARMACY-VANCOMYCIN DOSING SERVICE
Pharmacy Vancomycin Initial Note  Date of Service 2023  Patient's  1970  52 year old, male    Indication: Abscess and CNS infection     Current estimated CrCl = Estimated Creatinine Clearance: 39.9 mL/min (A) (based on SCr of 1.43 mg/dL (H)).    Creatinine for last 3 days  2023:  1:13 PM Creatinine 2.91 mg/dL  2023:  8:43 AM Creatinine 2.06 mg/dL  2023:  3:31 AM Creatinine 1.43 mg/dL    Recent Vancomycin Level(s) for last 3 days  No results found for requested labs within last 3 days.      Vancomycin IV Administrations (past 72 hours)                     vancomycin (VANCOCIN) 1,250 mg in 0.9% NaCl 250 mL intermittent infusion (mg) 1,250 mg New Bag 23 1748                    Nephrotoxins and other renal medications (From now, onward)      Start     Dose/Rate Route Frequency Ordered Stop    23 1800  vancomycin (VANCOCIN) 1000 mg in dextrose 5% 200 mL PREMIX         1,000 mg  200 mL/hr over 1 Hours Intravenous EVERY 24 HOURS 23 1704      23 1730  vancomycin (VANCOCIN) 1,250 mg in 0.9% NaCl 250 mL intermittent infusion         1,250 mg  over 90 Minutes Intravenous ONCE 23 1701              Contrast Orders - past 72 hours (72h ago, onward)      Start     Dose/Rate Route Frequency Stop    23 1530  gadobutrol (GADAVIST) injection 5 mL         5 mL Intravenous ONCE 23 1559    23 1000  iopamidol (ISOVUE-370) solution 100 mL         100 mL Intravenous ONCE 23 0956    23 0900  iopamidol (ISOVUE-370) solution 100 mL  Status:  Discontinued         100 mL Intravenous ONCE 23 0908                  Plan:  Give 1250 mg IV vancomycin load. Continue vancomycin  1000 mg IV q24h.   Vancomycin monitoring method: Trough (Method 1 = dosing nomogram)  Vancomycin therapeutic monitoring goal: 15-20 mg/L  Pharmacy will check vancomycin levels as appropriate in 1-3 Days.    Serum creatinine levels will be ordered daily for the first week of  therapy and at least twice weekly for subsequent weeks.      CAROLINE RITCHIE, RP

## 2023-07-28 NOTE — PROGRESS NOTES
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 Now with +BC [facility]-hospitalist suspecting meningitis.     AM ROUNDS:   AMS, confused, follows some simple commands, in a lot of pain, unable to tell us where he was in pain   Moving all extremities spontaneously, would not participate in strength exam or any further exam    WBC 12.6  CRP  ESR    Narrative & Impression   EXAM: CT HEAD W/O CONTRAST  LOCATION: [facility]  DATE: 7/28/2023     INDICATION: Follow-up intracranial hemorrhage.  COMPARISON: 07/27/2023  TECHNIQUE: Routine CT Head without IV contrast. Multiplanar reformats. Dose reduction techniques were used.     FINDINGS:  INTRACRANIAL CONTENTS: Question subtle increased along the anterior aspect of the right-sided subdural hemorrhage. No midline shift or effacement of basal cisterns. No CT evidence of acute infarct. Normal parenchymal attenuation. Normal ventricles and   sulci.      VISUALIZED ORBITS/SINUSES/MASTOIDS: No intraorbital abnormality. Mild mucosal thickening scattered about the paranasal sinuses. Small left mastoid effusion.     BONES/SOFT TISSUES: Demonstrated left sphenoid wing and temporal bone fracture.                                                                      IMPRESSION:  1.  Question subtle increased volume of subdural hemorrhage along the anterior right cerebral hemisphere without a significant change in thickness or associated mass effect.         Plan:  -OK for low volume LP; approved by Dr. Casarez and updated hospitalist   -BRAIN MRI STAT to rule out empyema  -CRP, ESR      -continue to monitor  neuro status   -keppra BID x 7 days   -oncology, ENT, hospitalist involved in other cares    Plans reviewed with Dr. Casarez and hospitalist MD Rosa SCHAEFER [address]  [place] [address]    Tel [phone]    Principal Problem:    Anemia  Active Problems:    Acute kidney injury (H)    Subdural hemorrhage (H)    Skull fracture (H)    Sepsis (H)     Rosa Rosa PA-C    Interval History   Stable     Physical Exam   Temp: 98.5  F (36.9  C) Temp src: Oral BP: 112/65 Pulse: 89   Resp: 18 SpO2: 96 % O2 Device: None (Room air) Oxygen Delivery: 2 LPM  Vitals:    07/26/23 1301   Weight: 103 lb (46.7 kg)     Vital Signs with Ranges  Temp:  [97.4  F (36.3  C)-99.9  F (37.7  C)] 98.5  F (36.9  C)  Pulse:  [] 89  Resp:  [10-18] 18  BP: ()/(50-70) 112/65  SpO2:  [90 %-98 %] 96 %  I/O last 3 completed shifts:  In: -   Out: 400 [Urine:400]    Asleep, awakens slight with cares and grimacing in pain   Opens eyes spontaneously   ZIMMERMAN spontaneously     Does not participate in any further neurologic exam   Negative clonus       Medications    sodium chloride 125 mL/hr at 07/27/23 0642       gabapentin  200 mg Oral BID    heparin  5-10 mL Intracatheter Q28 Days    heparin lock flush  5-10 mL Intracatheter Q24H    levETIRAcetam  500 mg Intravenous Q12H    piperacillin-tazobactam  2.25 g Intravenous Q6H    sodium chloride (PF)  10-20 mL Intracatheter Q28 Days    sodium chloride (PF)  3 mL Intracatheter Q8H       Plans discussed with Dr. Casarez who was in agreement with plans    Rosa SCHAEFER [facility]  [address]  [address]  [place] [address]    Tel [phone]  Pager [phone]

## 2023-07-28 NOTE — PROGRESS NOTES
House [facility] brief note:    RUE extravasation with noted R antecubital edema.    Paged by nursing at 1850 requesting bedside evaluation in setting of pt's PIV infiltrated, swollen.  At time of arrival, pt sitting upright in bed, awake, alert, in no overt distress, holding his cell phone with his RUE, talking on the phone.  Nursing notes pt had a new PIV placed in R AC a few hours ago.  Tourniquet was inadvertantly left in place for a couple of hours when CT technician noted RUE discoloration (bluish-purplish tint) and found tourniquet left in place.  Tourniquet was removed with noted improvement in RUE coloring.  After return back from CT, nursing started KCl infusion via R AC.  Nursing subsequently noted pt's R AC region appeared swollen a few inches above and below elbow, no obvious erythema or ecchymosis noted.  Pt's R forearm and upper arm with soft tissue compartments except for noted area of swelling near R AC, slightly firm to palpation.  Of note, pt reports some pain at sight of swelling but no pain throughout remainder of arm.  Pt reports no numbness or tingling.  Pt able to grasp R hand with strong strength and move all fingers without difficulty; able to bend at elbow without difficulty.  Pt with +2 R radial and ulnar pulses.      Interventions:  - Infiltration/extravasation order set ordered  - Requested for pt's port to be accessed for IV access and lab draws; implanted port order set ordered  - Requested for pt to notify nursing immediately if develops acute RUE pain, numbness, tingling, decreased ROM; also discussed with nursing to monitor for above symptoms and to notify house [facility]K immediately if pt develops any of the above symptoms as pt may require further workup and imaging    [place], [place], CNP  House [facility]    No charge.

## 2023-07-28 NOTE — PROGRESS NOTES
MD Notification    Notified Person: MD    Notified Person Name:Kumar Burr    Notification Date/Time:7/28/23 1827    Notification Interaction:page    Purpose of Notification: WP, Pt's MRI results are back and Hospitalist wanted to make sure you have read them.  Thank you      Orders Received:    Comments:Hospitalist Dr. Meza aware of the MRI    Kumar Crystalld called back stating that he is aware of Pt's MRI

## 2023-07-28 NOTE — CONSULTS
[facility]    Infectious Disease Consultation     Date of Admission:  7/26/2023  Date of Consult : 07/28/23    Assessment:  52 YM with  oropharyngeal cancer of the posterior pharynx diagnosed in 2022,  s/p chemoradiation therapy, . recently found to have a posterior pharyngeal ulcer felt to be related to radiation, who has been admitted after a fall,  has been found to have a non depressed skull fracture with subdural hematoma as well as positive blood cxs related to extensive cervical spine C1 osteomyelitis , extensive C cpine C1-C7 epidural abscess and deep neck infection with possible meningitis as a consequence of posterior pharyngeal ulcer erosion into the cervical C1 vertebral body.    -Extensive cervical spine epidural abscess C1-C7 / C1 osteomyelitis and deep neck infection due to posterior pharyngeal ulcer erosion into the C1 vertebral body  -Positive blood cxs for gram negative cocci  -Fall with acute subdural hematoma / possible empyema  -MRI findings of subtle CNS infarcts / cortical contusion or traumatic diffuse anoxal injury  -Oropharyngeal cancer s/p chemoradiation therapy with 5FU + carboplatin 8/31/22-10/26/22 complicated by moderate mucositis and YAYA's  -Marked leukocytosis  -Ongoing odynophagia , posterior pharyngeal ulcer likely malignant with ongoing weight loss  -YAYA  -HTN, anemia    Recommendations:  1. Follow blood cxs, per discussion with micro , the organisms is growing on anaerobic plates and may be veillionella sp , does not appear to be N.meningitides  2. Given extent of C spine infection, will need Neurosurgical evaluation for debridement and evacuation of abscess   3. Treat with Meropenem and Vancomycin for now until further information is available . Antibiotic de escalation based on cx data  4. Lumbar puncture can be considered, though doubt it will  now with current MRI findings establishing deep infection and need for prolonged antibiotic  therapy  Follow clinical status and labs    ID will follow     Charlette Cee MD    Reason for Consult   Reason for consult: I was asked to evaluate this patient for Treatment recommendations for cervical spine infection with bacteremia    Primary Care Physician   Abdiel Kelly    Chief Complaint   Fever and headache , positive blood cxs    History is obtained from medical records    History of Present Illness   Iain Fuentes is a 52 year old male who presents with history of oropharyngeal cancer of the posterior pharynx diagnosed in 2022,  s/p chemoradiation ending 10/2022. Recently found to have a posterior pharyngeal felt to be a complication of radiation , PET showed increased metabolic activity .who presented to the hospital on 7/26/23 after he was found down and has been found to have non depressed skull fracture with subdural hematoma as well as positive blood cxs and concern for cNS infection.. He was confused, he has had weight loss, fever, headache, heck pain and leukocytosis. Blood cxs turned positive for gram negative cocci pending further identification. CT neck shows     Ulceration involving the left posterior pharyngeal wall extending  to the C1 ring, questionably deeper compared to the prior neck CT from  5/31/2023 with focal erosion involving the right anterior C1 ring raising concern  for osteomyelitis, new compared to 5/31/2023. There is marked worsening of associated soft tissue swelling and inflammatory change involving the pharynx, prevertebral space,  bilateral carotid space, and bilateral paraspinal musculature,  concerning for multifocal deep space infection and increasing epidural fluid attenuation raising concern for epidural phlegmon/abscess.    ID has been asked to assist with antibiotic management.  Patent was somnolent and did not provide any history    He has just returned from MRI head and cervical spine which shows    Posterior pharyngeal wall ulceration extending to the C1  vertebra  with marrow signal changes involving the C1 vertebra consistent with  osteomyelitis.  Epidural abscess extending from C1 through C7 thickest at C2  encasing the cervical cord. No convincing cord signal abnormalities.  Widespread fluid stranding enhancement throughout the deep spaces  of the neck, concerning for extensive multicompartment infection.  .  Past Medical History   I have reviewed this patient's medical history and updated it with pertinent information if needed.   Past Medical History:   Diagnosis Date     Alcohol abuse      Hypertension      Lumbar herniated disc      Marijuana dependence (H)        Past Surgical History   I have reviewed this patient's surgical history and updated it with pertinent information if needed.  Past Surgical History:   Procedure Laterality Date     ARTHROSCOPY KNEE WITH MENISCUS ALLOGRAFT Left      EXCISE LESION INTRAORAL N/A 7/15/2022    Procedure: 1.  Direct laryngoscopy. 2.  Evaluation under anesthesia with biopsies;  Surgeon: Austin Kate MD;  Location: [facility] OR     IR CHEST PORT PLACEMENT > 5 YRS OF AGE  8/23/2022     [facility] NONSPECIFIC PROCEDURE  1997    facial surgery/repair after trauma       Prior to Admission Medications   Prior to Admission Medications   Prescriptions Last Dose Informant Patient Reported? Taking?   Acetaminophen (TYLENOL ARTHRITIS PAIN PO)  at PRN Self Yes Yes   Sig: Take 2 tablets by mouth daily as needed (pain)   OLANZapine (ZYPREXA) 5 MG tablet Past Month at PRN Self Yes Yes   Sig: Take 5 mg by mouth nightly as needed (sleep)   amLODIPine (NORVASC) 5 MG tablet Not Taking at pt reports this was stopped ~2 wks ago Self No No   Sig: TAKE 1 TABLET BY MOUTH EVERY DAY   Patient not taking: Reported on 7/26/2023   cyclobenzaprine (FLEXERIL) 10 MG tablet 7/26/2023 at ~1200 Self Yes Yes   Sig: Take 10 mg by mouth 3 times daily as needed for muscle spasms   gabapentin (NEURONTIN) 100 MG capsule 7/26/2023 at AM Self Yes Yes   Sig: Take 200 mg by  mouth 2 times daily Med last filled   ibuprofen (ADVIL/MOTRIN) 200 MG tablet  at PRN Self Yes Yes   Sig: Take 200 mg by mouth every 6 hours as needed for pain   lisinopril-hydrochlorothiazide (ZESTORETIC) 20-12.5 MG tablet 2023 at AM Self No Yes   Sig: Take 1 tablet by mouth daily   ondansetron (ZOFRAN ODT) 8 MG ODT tab  Self Yes Yes   Sig: Take 8 mg by mouth every 8 hours as needed for nausea or other (vomiting)      Facility-Administered Medications: None     Allergies   No Known Allergies    Immunization History   Immunization History   Administered Date(s) Administered     COVID-19 MONOVALENT 12+ (Pfizer) 2021, 2021     Influenza Vaccine 50-64 or 18-64 w/egg allergy (Flublok) 2021     Pneumococcal 23 valent 2021     TD,PF 7+ (Tenivac) 03/15/2011     Zoster recombinant adjuvanted (SHINGRIX) 2021       Social History   I have reviewed this patient's social history and updated it with pertinent information if needed. Iain Fuentes  reports that he has been smoking cigarettes. He started smoking about 37 years ago. He has a 20.00 pack-year smoking history. He has never used smokeless tobacco. He reports current alcohol use. He reports current drug use. Drug: Marijuana.    Family History   I have reviewed this patient's family history and updated it with pertinent information if needed.   Family History   Problem Relation Age of Onset     Arthritis Father      Blood Disease Father         amyloidosis     Depression Mother         committed suicide      Hypertension Brother      Cancer Paternal Grandmother          of lung cancer     Gastrointestinal Disease Paternal Grandfather         pancreatic disorder       Review of Systems   The 10 point Review of Systems cannot be obtained    Physical Exam   Temp: 98.5  F (36.9  C) Temp src: Oral BP: 112/65 Pulse: 89   Resp: 18 SpO2: 96 % O2 Device: None (Room air) Oxygen Delivery: 2 LPM  Vital Signs with Ranges  Temp:  [97.4  F  (36.3  C)-99.9  F (37.7  C)] 98.5  F (36.9  C)  Pulse:  [] 89  Resp:  [10-18] 18  BP: ()/(50-70) 112/65  SpO2:  [90 %-98 %] 96 %  103 lbs 0 oz  Body mass index is 16.62 kg/m .    GENERAL APPEARANCE:  Somnolent, not interactive,a ppears cachectic and chronically ill  EYES:closed  RESP: lungs clear   CV: S1S2  ABDOMEN: soft, nontender  MS: no edema  SKIN: no rash    Data   All laboratory data reviewed    Microbiology  7/26 blood cx    Line, venous; Blood    0 Result Notes  Culture Positive on the 2nd day of incubation Abnormal       Gram negative cocci Panic    1 of 2 bottles          Imaging  Narrative & Impression   CT SCAN OF THE NECK WITH CONTRAST  7/28/2023 10:31 AM      HISTORY: History of pharyngeal cancer, now septic.     TECHNIQUE: Axial CT images of the neck following administration of  intravenous contrast with reformations. Radiation dose for this scan  was reduced using automated exposure control, adjustment of the mA  and/or kV according to patient size, or iterative reconstruction  technique. 70 mL Isovue-370 IV.      COMPARISON: Neck CT 5/31/2023.     FINDINGS:       Ulceration involving the posterior pharyngeal wall appears deeper  compared to the prior exam, now extending to the anterior arch of the  C1 ring. Soft tissue swelling surrounding the ulcer has worsened  compared to the prior exam. Extensive soft tissue swelling and  inflammatory change throughout the deep spaces of the neck involving  the prevertebral/retropharyngeal space and bilateral carotid spaces.     Increasing epidural fluid attenuation contributing to spinal canal  stenosis.     Increased soft tissue swelling and inflammatory change involving the  bilateral neck musculature, most conspicuous involving the posterior  paraspinal musculature with areas of heterogeneous enhancement.     The oral cavity, oropharynx, hypopharynx, and larynx otherwise  demonstrate posttreatment changes as before.     The salivary glands  demonstrate posttreatment changes and are  otherwise unremarkable. The thyroid gland is unremarkable. Major neck  vasculature appears patent with scattered atherosclerotic plaquing.  Possible fluid collection along the left C1 transverse process and  vertebral artery V3 segment (series 3 image 27).     Known facial fractures/intracranial hemorrhage, incompletely  characterized. Superimposed cervical spine degenerative changes.  Presumed chronic scarring and atelectasis at the lung apices.                                                                      IMPRESSION:  1. Ulceration involving the left posterior pharyngeal wall extending  to the C1 ring, questionably deeper compared to the prior neck CT from  5/31/2023.  2. Focal erosion involving the right anterior C1 ring raising concern  for osteomyelitis, new compared to 5/31/2023.  3. Marked worsening of associated soft tissue swelling and  inflammatory change involving the pharynx, prevertebral space,  bilateral carotid space, and bilateral paraspinal musculature,  concerning for multifocal deep space infection.  4. Increasing epidural fluid attenuation raising concern for epidural  phlegmon/abscess.  5. Possible fluid collection along the left C1 transverse  process/vertebral artery V3 segment.  6. Neck muscle swelling and inflammatory change can also be seen in  the setting of rhabdomyolysis.  7. Recommend cervical spine MRI with and without contrast.

## 2023-07-28 NOTE — SIGNIFICANT EVENT
"Significant Event Note    Time of event: 5:02 AM July 28, 2023    Description of event:  Paged for new lethargy overnight, ongoing headache. BP 90 systolic, . Has gotten pain medications tonight.    Plan:  Bolus IVF ordered. Repeat CTH ordered    06:36 addendum: CTH results:  \"IMPRESSION:  1.  Question subtle increased volume of subdural hemorrhage along the anterior right cerebral hemisphere without a significant change in thickness or associated mass effect.\"    Per nursing staff he has no clear new focal neurologic deficits and is much more arousable with IVF, following commands. BP now over 100 systolic. We continue to monitor very closely this AM, NSG to reassess this AM.    Discussed with: bedside nurse    Awais Flores MD    "

## 2023-07-28 NOTE — PLAN OF CARE
SLP: ST orders received, chart reviewed and discussed with RN. Pt not appropriate for swallow eval at this time and transporting to Radiology. Last SLP note in 2022 recommended pureed diet and thin liquids. No recent SLP follow up. Per MD, plan to replace feeding tube pending medical work up. Will continue to follow and evaluate when pt is appropriate. Continue frequent oral cares.

## 2023-07-28 NOTE — PROGRESS NOTES
Infectious disease    Attempted to see patient multiple times today . Has been off the floor for multiple tests.  Chart reviewed , seems to have pharyngeal ulcer extending posteriorly with extensive C spine infection, C1 osteomyelitis , epidural abscess/phlegmon and deep neck space infection  Patient is down for MRI scans.   Blood cxs are positive for gram negative cocci. 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

## 2023-07-28 NOTE — PROGRESS NOTES
[facility]    Medicine Progress Note - Hospitalist Service    Date of Admission:  7/26/2023    Assessment & Plan   Iain Fuentes is a 52 year old male who was brought to the emergency room after he was found down outside [place]. Upon admission he was found to have a SDH and later developed signs of sepsis. Patient's recent medical history is unclear due to him being a poor historian. By chart review he followed with ENT 05/2023. His cancer was considered resolved. 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 It does not appear patient ever had that done and he his chart notes that presented to his PCP and ortho clinic for ongoing neck pain (no discussion of his ulcer noted) He next presented to [facility] via EMS with his fall. His PEG tube has been removed and he is malnourished with at least 10 pound weight loss in 2-3 months. Events of the last 2-3 months are unclear.       Unwitnessed fall  Possible syncope  Subdural hematoma with 1 to 2 mm leftward midline shift of the septum pellucidum   Nondisplaced skull fracture to the L sphenoid and R maxillary sinus  *CT head on admission showed thin subdural hemorrhage along the right frontal convexity measuring up to 1 cm maximum with 1 to 2 mm leftward midline shift.  Also was found to have nondisplaced fracture of the skull.  *Repeat CT head with questionable subtle increase in SDH size but otherwise no change.    - reviewed case with ENT yesterday. Patient follows with Dr. Madison and is well known to their service. Appreciate their assistance in piecing together his recent medical history. Follow up Temporal and facial CT ordered at their request with results demonstrating R maxillary sinus fracture however no temporal bone fracture.  - BP goal <150 with HOB >30 degrees  - q4h neuro check  - appreciate neurosurgery and ENT follow up today  regarding new CT scan results  - overall suspect his fall due to volume depletion and sepsis    Bacteremia  Sepsis  Suspected meningitis  Patient presented with fever and hypotension with normal WBC count and normal lactate but elevated procal. UA clear and CXR clear.    - prelim blood cultures over the night positive for gram negative cocci. Currently on zosyn  - repeat blood culture today  - With patient's ongoing confusion and bacteremia with gram negative cocci highly concerned now for meningitis. Will consult ID and have paged out nsgy to discuss ability to obtain LP with his SDH. No signs of ICP on scans  - CT scan of his neck this morning to obtain more information, initially held off on imaging yesterday to give kidneys time to recover from YAYA    Update  Spoke with neurosurgery regarding clinical updates. Now concerned for possible empyema with meningitis. Small volume LP ok'd for sample. STAT MRI ordered but delayed by MRI checklist.    Second update:   CT neck resulted. See report. Updated neurosurgery. Will now obtain MRI c-spine and MRI Brain. Spoke with ID and micro now more concerning for oral organisms rather than neisseria however actual identification pending. Makes more sense. Updated brother Mookie about guarded status. [place] to [facility] [place]     Squamous cell carcinoma of posterior pharynx  Severe malnutrition  Definitve diagnosis by biopsy 07/15/2022. Underwent chemo and radiation with [place] [facility] and completed treatment late last year with complete resolution of cancer-plans for surveillance. Most recent ENT exam 05/2023 negative except for nonulcer ulcer.    - oncology visited with patient yesterday and also unclear why he PEG tube has been removed. Patient is well known to Dr. Dreyer but did not recognize him  - speech eval today however based on his history suspect patient will need another PEG tube placed. In the meantime follow SLP recommendations and consider NGT as we sort out details.     "  Acute kidney injury  - Suspect this is prerenal, recent creatinine was normal, presented with a creatinine of 2.91  - Slowly improving with aggressive IVF. No signs of volume overload plan to continue for now especially in light of CT scan with contrast planned     Acute anemia on chronic  -Hemoglobin at presentation today is 8.0, last hemoglobin was 10.7 per scan notes from [place] [facility]  -Unable to get further history regarding bleeding  - oncology ordered further anemia labs and folate has returned low with low iron and high ferritin  - IV folate ordered  - monitor Hb    Hypokalemia  Hypomagnesia  RN protocol    Essential hypertension  -Hold prior to admission antihypertensives given concern for sepsis and soft blood pressure.         Diet: NPO for Medical/Clinical Reasons Except for: Meds    DVT Prophylaxis: Pneumatic Compression Devices  King Catheter: Not present  Lines: PRESENT      Port A Cath Single 08/23/22 Right Chest wall-Site Assessment: WDL      Cardiac Monitoring: ACTIVE order. Indication: Tachyarrhythmias, acute (48 hours)  Code Status: Full Code      Clinically Significant Risk Factors        # Hypokalemia: Lowest K = 3 mmol/L in last 2 days, will replace as needed     # Hypomagnesemia: Lowest Mg = 1.6 mg/dL in last 2 days, will replace as needed   # Hypoalbuminemia: Lowest albumin = 2.6 g/dL at 7/26/2023  1:13 PM, will monitor as appropriate       # Hypertension: Noted on problem list        # Cachexia: Estimated body mass index is 16.62 kg/m  as calculated from the following:    Height as of this encounter: 1.676 m (5' 6\").    Weight as of this encounter: 46.7 kg (103 lb).  , PRESENT ON ADMISSION  # Severe Malnutrition: based on nutrition assessment, PRESENT ON ADMISSION        Disposition Plan      Expected Discharge Date: 07/29/2023                  Lady Meza DO  Hospitalist Service  [facility]  Securely message with [employer] (more info)  Text page via " [facility] Paging/Directory   ______________________________________________________________________    Interval History   Patient up in chair. Complaining of pain that appears to be his his R arm but on further review it appears this is actually headache and he is trying to hold his head. Pupil exam normal performed at bedside normal and CT head just 1 hour prior unchanged. Due to pain he is really unable to provide further history. Vitally stable. Wet breath sounds noted. Tried to open mouth for me.   Patient can follow commands and GCS is 14/15 but he falls asleep and is generally lethargic    Physical Exam   Vital Signs: Temp: 98.5  F (36.9  C) Temp src: Oral BP: 105/70 Pulse: 101   Resp: 15 SpO2: 96 % O2 Device: None (Room air) Oxygen Delivery: 2 LPM  Weight: 103 lbs 0 oz    Constitutional: In pain, unable to provide history  HEENT: noted laceration to L scalp, L neck and submandle area unable to see oropharynx due to generalized swelling and patient inability to open his mouth  Lungs: course breath sounds  Cardiovascular: no edema  Skin/Integumen: No rashes, no cyanosis,  Other:     Medical Decision Making       >60 MINUTES SPENT BY ME on the date of service doing chart review, history, exam, documentation & further activities per the note.      Data     I have personally reviewed the following data over the past 24 hrs:    13.6 (H)  \   7.6 (L)   / 266     135 (L) 102 32.7 (H) /  88   3.2 (L) 21 (L) 1.43 (H) \     Ferritin:  N/A % Retic:  0.5 LDH:  105       Imaging results reviewed over the past 24 hrs:   Recent Results (from the past 24 hour(s))   CT Head w/o Contrast    Narrative    CT HEAD WITHOUT CONTRAST  7/27/2023 12:19 PM     HISTORY: SDH       COMPARISON: 7/26/2023    TECHNIQUE: Using multidetector thin collimation helical acquisition  technique, axial, coronal and sagittal CT images from the skull base  to the vertex were obtained without intravenous contrast.   (topogram) image(s) also obtained and  reviewed.    FINDINGS:  Not substantially changed right hyperdense extra-axial fluid  collection measuring 6 mm over the right frontal and temporal  convexities. No substantial mass effect, or midline shift. No acute  loss of gray-white matter differentiation. Ventricles are  proportionate to the cerebral sulci. Clear basal cisterns.    Hyperdense debris in the left sphenoid locule, otherwise clear.  Paranasal sinuses, mastoid air cells. Nonfocal orbits.       Impression    IMPRESSION: No substantial change in right subdural hyperdense  hematoma. No mass effect. No CT findings of acute hydrocephalus.    KALEN FOLEY DO         SYSTEM ID:  [id number]   CT Temporal Bones wo Contrast    Narrative    EXAM: CT TEMPORAL W/O CONTRAST  LOCATION: [facility]  DATE: 7/27/2023    INDICATION: head injury  COMPARISON: None.  TECHNIQUE:  Routine without contrast including dedicated thin section multiplanar reformats of each temporal bone. Dose reduction techniques were used.    FINDINGS:  RIGHT TEMPORAL BONE: Normal external auditory canal and tympanic membrane. Normal middle ear cavity and ossicles. No mastoid effusion. No evidence for otosclerosis. Normal cochlea, semicircular canals, vestibule, and vestibular aqueduct. Intact bony   carotid canal and facial nerve canal.     LEFT TEMPORAL BONE: Normal external auditory canal and tympanic membrane. Normal middle ear cavity and ossicles. Small effusion dependent left mastoid air cells. No evidence for otosclerosis. Normal cochlea, semicircular canals, vestibule, and vestibular   aqueduct. Intact bony carotid canal and facial nerve canal.     OTHER: The visualized intracranial compartment is unremarkable. Small foci of soft tissue air in the right infratemporal fossa. Redemonstrated fracture left sphenoid bone greater and lesser wings of the skull base.      Impression    IMPRESSION:  1.  No temporal bone fracture.   CT Facial Bones without Contrast     Narrative    EXAM: CT FACIAL BONES WITHOUT CONTRAST  LOCATION: [facility]  DATE: 7/27/2023    INDICATION: eval for further facial fractures  COMPARISON: CT head 07/26/2023  TECHNIQUE: Routine CT Maxillofacial without IV contrast. Multiplanar reformats. Dose reduction techniques were used.     FINDINGS:  OSSEOUS STRUCTURES/SOFT TISSUES: No localized soft tissue swelling/inflammation. Subtle fracture anterior wall right maxillary sinus. No evidence for dental trauma or periapical abscess.    ORBITAL CONTENTS: No acute abnormality.    SINUSES: There is air-fluid levels bilateral maxillary sinuses. Mild mucosal thickening scattered about the paranasal sinuses.    VISUALIZED INTRACRANIAL CONTENTS: Partially visualized right convexity subdural hematoma. Redemonstrated nondisplaced fracture left greater wing of sphenoid extending inferiorly and medially into the lesser wing of sphenoid, traversing the left inferior   orbital fissure.      Impression    IMPRESSION:   1.  Fracture anterior wall right maxillary sinus.     CT Head w/o Contrast    Narrative    EXAM: CT HEAD W/O CONTRAST  LOCATION: [facility]  DATE: 7/28/2023    INDICATION: Follow-up intracranial hemorrhage.  COMPARISON: 07/27/2023  TECHNIQUE: Routine CT Head without IV contrast. Multiplanar reformats. Dose reduction techniques were used.    FINDINGS:  INTRACRANIAL CONTENTS: Question subtle increased along the anterior aspect of the right-sided subdural hemorrhage. No midline shift or effacement of basal cisterns. No CT evidence of acute infarct. Normal parenchymal attenuation. Normal ventricles and   sulci.     VISUALIZED ORBITS/SINUSES/MASTOIDS: No intraorbital abnormality. Mild mucosal thickening scattered about the paranasal sinuses. Small left mastoid effusion.    BONES/SOFT TISSUES: Demonstrated left sphenoid wing and temporal bone fracture.      Impression    IMPRESSION:  1.  Question subtle increased  volume of subdural hemorrhage along the anterior right cerebral hemisphere without a significant change in thickness or associated mass effect.

## 2023-07-29 PROBLEM — D84.9 ACQUIRED IMMUNOCOMPROMISED STATE (H): Status: ACTIVE | Noted: 2023-01-01

## 2023-07-29 PROBLEM — A41.4: Status: ACTIVE | Noted: 2023-01-01

## 2023-07-29 PROBLEM — A41.50 GRAM NEGATIVE SEPSIS (H): Status: ACTIVE | Noted: 2023-01-01

## 2023-07-29 PROBLEM — G06.1 ABSCESS IN EPIDURAL SPACE OF CERVICAL SPINE: Status: ACTIVE | Noted: 2023-01-01

## 2023-07-29 NOTE — PROGRESS NOTES
"Note Infectious Disease Consult Service Progress Note  Covering for [facility]   Pt Name Iain Fuentes   Date 07/29/2023   MRN [id number]     Notes / labs / imaging test results and other data were reviewed    CHIEF COMPLAINT: REASON FOR VISIT    Found down    HPI    51 yo with oropharyngal cancer, posterior pharyngeal ulcer which has eroed into the cervical spine and now has C spine osteomyelitis and extensive epidural abscess    7/26 Found down / to ER w subdural hematoma / skull fracture / fever / hypotension / YAYA  Oropharyngeal ulcer (? Chemo / radiation related)    CT scans - R convexity SDH / fracture R maxillary sinus    7/29/2023  INTERIM UPDATE    2/2 blood cultures from admission [facility]       Remainder of 14-point ROS was negative except as listed above    PFSH:  Personal / Family / Social Histories were reviewed and updated  nil new  Vital Signs: /85   Pulse 106   Temp 98.3  F (36.8  C) (Axillary)   Resp 16   Ht 1.676 m (5' 6\")   Wt 46.7 kg (103 lb)   SpO2 96%   BMI 16.62 kg/m    EXAM    genl   In bed     neuro   Sedated / asleep  Getting EEG     lungs   Respiring OK     CV   tachy     skin   No visible rash         Data  Microbiology  7/26 Blood culture [facility] (I personally called micro lab)     Imaging  7/26 CT face   Fracture anterior R max sinus    7/28 CT neck  1. Ulceration involving the left posterior pharyngeal wall extending  to the C1 ring, questionably deeper compared to the prior neck CT from  5/31/2023.  2. Focal erosion involving the right anterior C1 ring raising concern  for osteomyelitis, new compared to 5/31/2023.  3. Marked worsening of associated soft tissue swelling and  inflammatory change involving the pharynx, prevertebral space,  bilateral carotid space, and bilateral paraspinal musculature,  concerning for multifocal deep space infection.  4. Increasing epidural fluid attenuation raising concern for epidural  phlegmon/abscess.  5. Possible fluid " collection along the left C1 transverse  process/vertebral artery V3 segment.  6. Neck muscle swelling and inflammatory change can also be seen in  the setting of rhabdomyolysis.    7/28 MRI  BRAIN MRI:   1. Acute subdural hematoma along the right convexity with mild mass  effect, unchanged compared to head CT from 7/28/2023.  2. Multiple small areas of diffusion restriction involving the left  frontal lobe, left temporal lobe, right parietal lobe with an left  lateral midbrain corresponding susceptibility related signal loss in  the left frontal lobe and right parietal lobe raising concern for  traumatic diffuse axonal injury. Subtle infarcts with petechial  hemorrhage not excluded. The right parietal lesions could represent  cortical contusions.  3. 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     CERVICAL SPINE MRI:  1.  Posterior pharyngeal wall ulceration extending to the C1 vertebra  with marrow signal changes involving the C1 vertebra consistent with  osteomyelitis.  2. Epidural abscess extending from C1 through C7 thickest at C2  encasing the cervical cord. No convincing cord signal abnormalities.  3. Widespread fluid stranding enhancement throughout the deep spaces  of the neck, concerning for extensive multicompartment infection.  4. The soft tissue changes extensively involve the posterior  paraspinal musculature, and sequela of superimposed rhabdomyolysis  cannot be excluded which can be drug-induced.  5. Superimposed traumatic muscular/ligamentous strain cannot be  excluded.  LABS  Lab Results   Component Value Date/Time    WBC 18.2 (H) 07/29/2023 04:49 AM    WBC 13.6 (H) 07/28/2023 03:31 AM    WBC 12.6 (H) 07/27/2023 08:43 AM    WBC 7.4 07/26/2023 01:13 PM    WBC 13.3 (H) 10/17/2012 06:45 PM    WBC 10.3 03/15/2011 06:36 PM    AST 26 07/26/2023 01:13 PM    AST 47 06/19/2007 03:37 PM    ALT 25 07/26/2023 01:13 PM    ALT 41 06/19/2007 03:37  PM    ALKPHOS 241 (H) 07/26/2023 01:13 PM    ALKPHOS 66 06/19/2007 03:37 PM           ASSESSMENT & SUGGESTIONS  Patient Active Problem List   Diagnosis Code     Acute kidney injury (H) N17.9     Subdural hemorrhage (H) I62.00     Sepsis (H) A41.9     Gram negative sepsis (H) A41.50     Anaerobic sepsis (H) A41.4     Leukocytosis      Abscess epidural cervical spine G06.1     Acquired immunocompromised state (H) D84.9      Anaerobic gram negative cocci - ? Veillonella spp (oral davion)  That would not be unthinkable with his known oropharyngeal issues  Per RN, pt worse today -- seen to move all limbs, but does not follow commands.  Tho' only [facility] (so far) in blood, with oropharyngeal infection must consider polymicrobial  No known MRSA  neg MRSA nares good negative predictive value for MRSA infection    Meropenem   Stop vanco   If seizures, consider change meropenem to amp-sulbactam (still good broad cover ? Less likely to lower seizure threshold than carbapenems)  F/u ID of Blood [facility]  Follow-up repeat blood cultures             Garrett Foley MD  Covering for [facility] [employer] [facility]  Infectious Disease service    CURRENT MED REVIEWD  Current Facility-Administered Medications   Medication     acetaminophen (TYLENOL) tablet 650 mg    Or     acetaminophen (TYLENOL) Suppository 650 mg     gabapentin (NEURONTIN) capsule 200 mg     heparin 100 unit/mL injection 5-10 mL     heparin lock flush 10 UNIT/ML injection 5-10 mL     heparin lock flush 10 UNIT/ML injection 5-10 mL     HYDROmorphone (PF) (DILAUDID) injection 0.5 mg     labetalol (NORMODYNE/TRANDATE) injection 5 mg     levETIRAcetam (KEPPRA) intermittent infusion 500 mg     lidocaine (LMX4) cream     lidocaine (LMX4) cream     lidocaine 1 % 0.1-1 mL     lidocaine 1 % 0.1-1 mL     meropenem (MERREM) 2 g in sodium chloride 0.9 % 100 mL intermittent infusion     naloxone (NARCAN) injection 0.2 mg    Or     naloxone (NARCAN) injection 0.4 mg    Or     naloxone (NARCAN)  injection 0.2 mg    Or     naloxone (NARCAN) injection 0.4 mg     nitroGLYcerin (NITROSTAT) sublingual tablet 0.4 mg     OLANZapine (zyPREXA) tablet 5 mg     ondansetron (ZOFRAN ODT) ODT tab 4 mg    Or     ondansetron (ZOFRAN) injection 4 mg     oxyCODONE (ROXICODONE) tablet 5-10 mg     sodium chloride (PF) 0.9% PF flush 10-20 mL     sodium chloride (PF) 0.9% PF flush 10-20 mL     sodium chloride (PF) 0.9% PF flush 10-20 mL     sodium chloride (PF) 0.9% PF flush 3 mL     sodium chloride (PF) 0.9% PF flush 3 mL     sodium chloride (PF) 0.9% PF flush 3 mL     sodium chloride (PF) 0.9% PF flush 3 mL     sodium chloride 0.9% infusion     vancomycin (VANCOCIN) 1000 mg in dextrose 5% 200 mL PREMIX

## 2023-07-29 NOTE — PROGRESS NOTES
Hematologic note  Attempted to see patient but patient was having a EEG.    Please see Dr. Dreyer's note on 7/27 for details.  Previous history of head neck cancer treated with radiation.    Now admitted with fall subdural hematoma and epidural abscess.  Agree with ongoing management by primary team no oncologic intervention  Anemia likely related to nutritional deficiencies folic acid    Started on IV folic acid switch to oral if patient has a PEG tube.   Consider giving IV iron if patient is stable

## 2023-07-29 NOTE — PROVIDER NOTIFICATION
"Page to hospitalist:    \"Is pt able to have a higher dose of IV dilaudid? unable to take PO meds right now, giving 0.3mg q2h & not effective, pt still crying/calling out in pain about an hour after each dose. Thanks    Kailyn, [phone]\"    Addendum:   0015 matthew messaged Dr. Flores as he is now covering, IV dilaudid dose increased to 0.5mg  "

## 2023-07-29 NOTE — CODE/RAPID RESPONSE
[facility]  House [facility] RRT Note  7/29/2023   Time called: 0346  RRT called for: Pain uncontrolled, episodes of apnea  Code status: Full Code    Assessment & Plan   Patient is a 52-year-old male who was brought in by EMS after being found down outside of [employer]. Patient was found to have SDH, nondisplaced skull fracture to the L sphenoid and R maxillary sinus, and subsequent sepsis of unknown source but suspicion for meningitis with positive BC and possible empyema. 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 Patient had PEG tube previously but it had been removed.     I was paged to the bedside to evaluate Mr. Iain Fuentes for an acute episode of increased pain with yelling and rolling side-to-side in bed. T hese episodes of yelling and hyperactivity are interspersed by brief episodes of somnolence and apnea, prompting call for RRT.    Upon my arrival the patient was yelling and holding his head. Patient was awake and would respond sometimes to yes/no questions but otherwise was just yelling. Patient would not confirm that he was having head pain but he was cradling his head in his hands throughout the RRT. Pupils were equal and patient was moving all extremities. Patient was warm and dry with 2+ pulses. Patient was on room air with adequate SpO2 while awake but would then desaturate to 91% during brief apneic episodes accompanied by somnolence that lasted between 8-10 seconds and then patient would wake and breathe normally again. Patient was given 1mg IV dilaudid which relieved patient's pain and he was no longer yelling or holding his head. Patient did require 2L NC to maintain oxygen saturation >95% but overall this did seem to be an improvement in the patient condition. I suspect these symptoms are due to pain only and metabolic encephalopathy 2/2 sepsis but will repeat CT head to rule out acute  "changes in SDH. The episodic lethargy/somnolence has been occurring previously this hospitalization, unknown etiology, will add EEG to rule out frequent seizures.    BP (!) 141/95   Pulse 114   Temp 99  F (37.2  C) (Axillary)   Resp 13   Ht 1.676 m (5' 6\")   Wt 46.7 kg (103 lb)   SpO2 98%   BMI 16.62 kg/m        Diagnosis:  -- Acute pain 2/2 nondisplaced skull fracture and maxillary sinus fracture  -- Transient episodes of somnolence with associated apnea; query seizure activity    Plan:  -- 1 mg IV dilaudid once  -- Increase PRN dilaudid dosing to Q1h as he has been unable to take PO due to aspiration concerns  -- CT head w/o contrast  -- ABG  -- EEG    At the conclusion of this RRT patient was hemodynamically stable, pain well controlled, and will remain on current unit.    His history is significant for:  Past Medical History:   Diagnosis Date    Alcohol abuse     Hypertension     Lumbar herniated disc     Marijuana dependence (H)      Past Surgical History:   Procedure Laterality Date    ARTHROSCOPY KNEE WITH MENISCUS ALLOGRAFT Left     EXCISE LESION INTRAORAL N/A 7/15/2022    Procedure: 1.  Direct laryngoscopy. 2.  Evaluation under anesthesia with biopsies;  Surgeon: Austin Kate MD;  Location: [facility] OR    IR CHEST PORT PLACEMENT > 5 YRS OF AGE  8/23/2022    [facility] NONSPECIFIC PROCEDURE  1997    facial surgery/repair after trauma       Review of Systems   The 10 point Review of Systems is negative other than noted in the HPI or here.     Allergies   No Known Allergies    Physical Exam   Physical Exam  Constitutional:       General: He is in acute distress.      Appearance: He is ill-appearing.   Eyes:      Pupils: Pupils are equal, round, and reactive to light.   Cardiovascular:      Rate and Rhythm: Regular rhythm. Tachycardia present.   Pulmonary:      Effort: Pulmonary effort is normal.      Breath sounds: Rhonchi present.   Abdominal:      General: Bowel sounds are normal.   Musculoskeletal:      " Right lower leg: No edema.      Left lower leg: No edema.   Skin:     General: Skin is warm and dry.      Capillary Refill: Capillary refill takes less than 2 seconds.   Neurological:      Mental Status: He is disoriented.         Vital Signs with Ranges:  Temp:  [98.4  F (36.9  C)-101.6  F (38.7  C)] 99  F (37.2  C)  Pulse:  [] 114  Resp:  [13-20] 13  BP: ()/() 141/95  SpO2:  [95 %-98 %] 98 %  I/O last 3 completed shifts:  In: -   Out: 2400 [Urine:2400]    Data   Results for orders placed or performed during the hospital encounter of 07/26/23 (from the past 24 hour(s))   CT Head w/o Contrast    Narrative    EXAM: CT HEAD W/O CONTRAST  LOCATION: [facility]  DATE: 7/28/2023    INDICATION: Follow-up intracranial hemorrhage.  COMPARISON: 07/27/2023  TECHNIQUE: Routine CT Head without IV contrast. Multiplanar reformats. Dose reduction techniques were used.    FINDINGS:  INTRACRANIAL CONTENTS: Question subtle increased along the anterior aspect of the right-sided subdural hemorrhage. No midline shift or effacement of basal cisterns. No CT evidence of acute infarct. Normal parenchymal attenuation. Normal ventricles and   sulci.     VISUALIZED ORBITS/SINUSES/MASTOIDS: No intraorbital abnormality. Mild mucosal thickening scattered about the paranasal sinuses. Small left mastoid effusion.    BONES/SOFT TISSUES: Demonstrated left sphenoid wing and temporal bone fracture.      Impression    IMPRESSION:  1.  Question subtle increased volume of subdural hemorrhage along the anterior right cerebral hemisphere without a significant change in thickness or associated mass effect.   CT Soft Tissue Neck w Contrast    Narrative    CT SCAN OF THE NECK WITH CONTRAST  7/28/2023 10:31 AM     HISTORY: History of pharyngeal cancer, now septic.    TECHNIQUE: Axial CT images of the neck following administration of  intravenous contrast with reformations. Radiation dose for this scan  was reduced  using automated exposure control, adjustment of the mA  and/or kV according to patient size, or iterative reconstruction  technique. 70 mL Isovue-370 IV.     COMPARISON: Neck CT 5/31/2023.    FINDINGS:      Ulceration involving the posterior pharyngeal wall appears deeper  compared to the prior exam, now extending to the anterior arch of the  C1 ring. Soft tissue swelling surrounding the ulcer has worsened  compared to the prior exam. Extensive soft tissue swelling and  inflammatory change throughout the deep spaces of the neck involving  the prevertebral/retropharyngeal space and bilateral carotid spaces.    Increasing epidural fluid attenuation contributing to spinal canal  stenosis.    Increased soft tissue swelling and inflammatory change involving the  bilateral neck musculature, most conspicuous involving the posterior  paraspinal musculature with areas of heterogeneous enhancement.    The oral cavity, oropharynx, hypopharynx, and larynx otherwise  demonstrate posttreatment changes as before.    The salivary glands demonstrate posttreatment changes and are  otherwise unremarkable. The thyroid gland is unremarkable. Major neck  vasculature appears patent with scattered atherosclerotic plaquing.  Possible fluid collection along the left C1 transverse process and  vertebral artery V3 segment (series 3 image 27).    Known facial fractures/intracranial hemorrhage, incompletely  characterized. Superimposed cervical spine degenerative changes.  Presumed chronic scarring and atelectasis at the lung apices.      Impression    IMPRESSION:  1. Ulceration involving the left posterior pharyngeal wall extending  to the C1 ring, questionably deeper compared to the prior neck CT from  5/31/2023.  2. Focal erosion involving the right anterior C1 ring raising concern  for osteomyelitis, new compared to 5/31/2023.  3. Marked worsening of associated soft tissue swelling and  inflammatory change involving the pharynx, prevertebral  space,  bilateral carotid space, and bilateral paraspinal musculature,  concerning for multifocal deep space infection.  4. Increasing epidural fluid attenuation raising concern for epidural  phlegmon/abscess.  5. Possible fluid collection along the left C1 transverse  process/vertebral artery V3 segment.  6. Neck muscle swelling and inflammatory change can also be seen in  the setting of rhabdomyolysis.  7. Recommend cervical spine MRI with and without contrast.    Findings were discussed by phone between Dr. Corona and Dr. Meza  at approximately 1:05 PM on 7/28/2023.    PINKY CORONA MD         SYSTEM ID:  [id number]   Blood Culture Arm, Left    Specimen: Arm, Left; Blood   Result Value Ref Range    Culture No growth after 12 hours    TSH with free T4 reflex   Result Value Ref Range    TSH 1.26 0.30 - 4.20 uIU/mL   Lactic Acid STAT   Result Value Ref Range    Lactic Acid 0.7 0.7 - 2.0 mmol/L   Magnesium   Result Value Ref Range    Magnesium 1.5 (L) 1.7 - 2.3 mg/dL   Phosphorus   Result Value Ref Range    Phosphorus 2.7 2.5 - 4.5 mg/dL   CK total   Result Value Ref Range    CK 21 (L) 39 - 308 U/L   Blood Culture Arm, Right    Specimen: Arm, Right; Blood   Result Value Ref Range    Culture No growth after 12 hours    XR Chest 2 Views    Narrative    CHEST TWO VIEWS  7/28/2023 12:44 PM     HISTORY: MRI checklist.    COMPARISON: July 26, 2023       Impression    IMPRESSION: Needle is accessing the port, otherwise no metallic  foreign bodies demonstrated. Lungs are clear. There are no acute  infiltrates. The cardiac silhouette is not enlarged. Pulmonary  vasculature is unremarkable.     LEWIS IBQAL MD         SYSTEM ID:  [id number]   XR Abdomen 1 View    Narrative    ABDOMEN ONE VIEW  7/28/2023 12:45 PM     HISTORY: MRI check list.    COMPARISON: None.       Impression    IMPRESSION: No radiopaque foreign body demonstrated, portions of the  pelvis are obscured by contrast in the bladder.    LEWIS IQBAL MD          SYSTEM ID:  [id number]   MR Cervical Spine w/o & w Contrast    Narrative    MRI BRAIN AND CERVICAL SPINE: WITHOUT AND WITH CONTRAST  7/28/2023  4:05 PM    HISTORY: Subdural hematoma, head injury, fevers, abnormal neck CT    TECHNIQUE:  Multiplanar, multisequence MRI of the brain without and  with 5mL Gadavist    COMPARISON: Head CT 7/28/2023    FINDINGS:    BRAIN MRI:  Acute subdural hematoma along the right convexity with mild mass  effect, not appreciably changed.    Multiple small areas of diffusion restriction are present involving  the left frontal subcortical white matter. Small area of diffusion  restriction involving the left lateral temporal lobe. Small areas of  diffusion restriction involving the right parietal cortex. Suspected  small area of diffusion resection involving the left lateral midbrain.    T2 gradient echo imaging demonstrates subtle areas of susceptibility  related signal loss corresponding to diffusion restriction within the  left frontal white matter and involving the right parietal cortex.    Background of mild generalized parenchymal volume loss is present with  areas of white matter T2 hyperintense signal which likely represent  mild chronic small vessel ischemic change. Old periventricular infarct  along the frontal horn of the left lateral ventricle.    Abnormal marrow signal involving the upper cervical spine (refer to  cervical spine report). Mild paranasal sinus mucosal thickening. Fluid  signal within the left frontal and mastoid cavities, presumably  inflammatory. Soft tissue swelling with edema and enhancement  involving the visualized posterior upper neck musculature (refer to  cervical spine report).    CERVICAL SPINE MRI:    Posterior pharyngeal wall ulceration extending to the C1 ring.    Abnormal T1 hypointense signal, T2 hyperintense signal, and  enhancement within the C1 vertebra (right worse than left) with  cortical erosion consistent with osteomyelitis.    Widespread  abnormal soft tissue swelling and fluid/edema throughout  the deep spaces of the neck involving the prevertebral space,  bilateral carotid spaces, parapharyngeal spaces, and extensively the  paraspinal musculature, particularly the posterior paraspinal  musculature. Prevertebral fluid and appears ill-defined with  heterogeneous marginal enhancement (drainability uncertain).  Prevertebral fluid signal extends to the level of approximately T1-T2  at the superior mediastinum.    Spinal canal epidural abscess is present extending from C1 through  approximately C7 which is thickest at the level of C2 (series 12 image  10). The abscess encases the cervical cord and contributes to moderate  spinal canal stenoses. No convincing cord signal signal abnormality.    Superimposed degenerative changes are present contributing to spinal  canal stenoses at multiple levels. Multilevel moderate to severe  degenerative disease and facet arthropathy. Severe foraminal stenoses  at multiple levels. Alignment is significant for reversal of normal  cervical lordosis and multilevel grade 1 spondylolisthesis.    The visualized oral cavity, oropharynx, larynx, and salivary glands  demonstrate posttreatment changes.      Impression    IMPRESSION:    BRAIN MRI:   1. Acute subdural hematoma along the right convexity with mild mass  effect, unchanged compared to head CT from 7/28/2023.  2. Multiple small areas of diffusion restriction involving the left  frontal lobe, left temporal lobe, right parietal lobe with an left  lateral midbrain corresponding susceptibility related signal loss in  the left frontal lobe and right parietal lobe raising concern for  traumatic diffuse axonal injury. Subtle infarcts with petechial  hemorrhage not excluded. The right parietal lesions could represent  cortical contusions.  3. 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    CERVICAL SPINE MRI:  1.   Posterior pharyngeal wall ulceration extending to the C1 vertebra  with marrow signal changes involving the C1 vertebra consistent with  osteomyelitis.  2. Epidural abscess extending from C1 through C7 thickest at C2  encasing the cervical cord. No convincing cord signal abnormalities.  3. Widespread fluid stranding enhancement throughout the deep spaces  of the neck, concerning for extensive multicompartment infection.  4. The soft tissue changes extensively involve the posterior  paraspinal musculature, and sequela of superimposed rhabdomyolysis  cannot be excluded which can be drug-induced.  5. Superimposed traumatic muscular/ligamentous strain cannot be  excluded.    PINKY ENGLAND MD         SYSTEM ID:  [id number]   MR Brain w/o & w Contrast    Narrative    MRI BRAIN AND CERVICAL SPINE: WITHOUT AND WITH CONTRAST  7/28/2023  4:05 PM    HISTORY: Subdural hematoma, head injury, fevers, abnormal neck CT    TECHNIQUE:  Multiplanar, multisequence MRI of the brain without and  with 5mL Gadavist    COMPARISON: Head CT 7/28/2023    FINDINGS:    BRAIN MRI:  Acute subdural hematoma along the right convexity with mild mass  effect, not appreciably changed.    Multiple small areas of diffusion restriction are present involving  the left frontal subcortical white matter. Small area of diffusion  restriction involving the left lateral temporal lobe. Small areas of  diffusion restriction involving the right parietal cortex. Suspected  small area of diffusion resection involving the left lateral midbrain.    T2 gradient echo imaging demonstrates subtle areas of susceptibility  related signal loss corresponding to diffusion restriction within the  left frontal white matter and involving the right parietal cortex.    Background of mild generalized parenchymal volume loss is present with  areas of white matter T2 hyperintense signal which likely represent  mild chronic small vessel ischemic change. Old periventricular  infarct  along the frontal horn of the left lateral ventricle.    Abnormal marrow signal involving the upper cervical spine (refer to  cervical spine report). Mild paranasal sinus mucosal thickening. Fluid  signal within the left frontal and mastoid cavities, presumably  inflammatory. Soft tissue swelling with edema and enhancement  involving the visualized posterior upper neck musculature (refer to  cervical spine report).    CERVICAL SPINE MRI:    Posterior pharyngeal wall ulceration extending to the C1 ring.    Abnormal T1 hypointense signal, T2 hyperintense signal, and  enhancement within the C1 vertebra (right worse than left) with  cortical erosion consistent with osteomyelitis.    Widespread abnormal soft tissue swelling and fluid/edema throughout  the deep spaces of the neck involving the prevertebral space,  bilateral carotid spaces, parapharyngeal spaces, and extensively the  paraspinal musculature, particularly the posterior paraspinal  musculature. Prevertebral fluid and appears ill-defined with  heterogeneous marginal enhancement (drainability uncertain).  Prevertebral fluid signal extends to the level of approximately T1-T2  at the superior mediastinum.    Spinal canal epidural abscess is present extending from C1 through  approximately C7 which is thickest at the level of C2 (series 12 image  10). The abscess encases the cervical cord and contributes to moderate  spinal canal stenoses. No convincing cord signal signal abnormality.    Superimposed degenerative changes are present contributing to spinal  canal stenoses at multiple levels. Multilevel moderate to severe  degenerative disease and facet arthropathy. Severe foraminal stenoses  at multiple levels. Alignment is significant for reversal of normal  cervical lordosis and multilevel grade 1 spondylolisthesis.    The visualized oral cavity, oropharynx, larynx, and salivary glands  demonstrate posttreatment changes.      Impression     IMPRESSION:    BRAIN MRI:   1. Acute subdural hematoma along the right convexity with mild mass  effect, unchanged compared to head CT from 7/28/2023.  2. Multiple small areas of diffusion restriction involving the left  frontal lobe, left temporal lobe, right parietal lobe with an left  lateral midbrain corresponding susceptibility related signal loss in  the left frontal lobe and right parietal lobe raising concern for  traumatic diffuse axonal injury. Subtle infarcts with petechial  hemorrhage not excluded. The right parietal lesions could represent  cortical contusions.  3. 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    CERVICAL SPINE MRI:  1.  Posterior pharyngeal wall ulceration extending to the C1 vertebra  with marrow signal changes involving the C1 vertebra consistent with  osteomyelitis.  2. Epidural abscess extending from C1 through C7 thickest at C2  encasing the cervical cord. No convincing cord signal abnormalities.  3. Widespread fluid stranding enhancement throughout the deep spaces  of the neck, concerning for extensive multicompartment infection.  4. The soft tissue changes extensively involve the posterior  paraspinal musculature, and sequela of superimposed rhabdomyolysis  cannot be excluded which can be drug-induced.  5. Superimposed traumatic muscular/ligamentous strain cannot be  excluded.    PINKY ENGLAND MD         SYSTEM ID:  [id number]   CT Head w/o Contrast    Narrative    EXAM: CT HEAD W/O CONTRAST  LOCATION: [facility]  DATE: 7/28/2023    INDICATION: CT brain 7/28/2023.    COMPARISON: CT brain 7/28/2023 at 0540 hours.    TECHNIQUE: Routine CT head without IV contrast. Multiplanar reformats. Dose reduction techniques were used.    FINDINGS:  INTRACRANIAL CONTENTS: Stable high-attenuation subdural hematomas again seen extending along the right frontal parietal and temporal inner table. The  collection measures up to 7 mm in thickness in the coronal plane on both the current and prior. There is   associated mass effect with subtle leftward bowing of the septum pellucidum, as seen previously. Gray-white matter differentiation is preserved. Mild prominence of the lateral ventricles. Cerebellar tonsils are normally positioned. Sella is unremarkable   for technique. Corpus callosum is normally formed.    VISUALIZED ORBITS/SINUSES/MASTOIDS: Orbits are normal in appearance. Dependent fluid is seen within the left maxillary and both sphenoid sinuses compatible with active sinus disease or layering blood products. Mild ethmoid and left frontal sinus mucosal   thickening. Middle ear cavities and mastoid air cells are clear.    BONES/SOFT TISSUES: Stable appearance of nondisplaced left temporal fracture extending superiorly along the left frontal sphenoid synchondrosis.      Impression    IMPRESSION: Stable appearance of hyperattenuating right frontal parietal and temporal subdural hematoma. No finding for interval hemorrhage.     Glucose by meter   Result Value Ref Range    GLUCOSE BY METER POCT 71 70 - 99 mg/dL   Glucose by meter   Result Value Ref Range    GLUCOSE BY METER POCT 91 70 - 99 mg/dL   CRP inflammation   Result Value Ref Range    CRP Inflammation 283.40 (H) <5.00 mg/L   Erythrocyte sedimentation rate auto   Result Value Ref Range    Erythrocyte Sedimentation Rate 96 (H) 0 - 20 mm/hr   Magnesium   Result Value Ref Range    Magnesium 1.9 1.7 - 2.3 mg/dL   Potassium   Result Value Ref Range    Potassium 3.8 3.4 - 5.3 mmol/L   Glucose by meter   Result Value Ref Range    GLUCOSE BY METER POCT 73 70 - 99 mg/dL   Blood gas arterial   Result Value Ref Range    pH Arterial 7.35 7.35 - 7.45    pCO2 Arterial 41 35 - 45 mm Hg    pO2 Arterial 72 (L) 80 - 105 mm Hg    FIO2 21     Bicarbonate Arterial 23 21 - 28 mmol/L    Base Excess/Deficit (+/-) -2.9 -9.0 - 1.8 mmol/L    Mj's Test Yes    CT Head w/o Contrast     Narrative    EXAM: CT HEAD WITHOUT CONTRAST  LOCATION: [facility]  DATE: 07/29/2023    INDICATION: Increased pain and agitation, unchanged with analgesia.  COMPARISON: CT head 07/28/2023.  TECHNIQUE: Routine CT Head without IV contrast. Multiplanar reformats. Dose reduction techniques were used.    FINDINGS:  Right convexity subdural hematoma is stable to subtly decreased in thickness. No new hemorrhage or mass effect. Volume loss and presumed chronic small vessel ischemia is stable. No new hemorrhage. No acute infarct. Remainder unchanged.       Impression    IMPRESSION:  1. Stable to subtle decrease in right convexity subdural hemorrhage.     CBC with Platelets & Differential    Narrative    The following orders were created for panel order CBC with Platelets & Differential.  Procedure                               Abnormality         Status                     ---------                               -----------         ------                     CBC with platelets and d...[[id number]]  Abnormal            Final result                 Please view results for these tests on the individual orders.   CBC with platelets and differential   Result Value Ref Range    WBC Count 18.2 (H) 4.0 - 11.0 10e3/uL    RBC Count 2.45 (L) 4.40 - 5.90 10e6/uL    Hemoglobin 7.8 (L) 13.3 - 17.7 g/dL    Hematocrit 23.2 (L) 40.0 - 53.0 %    MCV 95 78 - 100 fL    MCH 31.8 26.5 - 33.0 pg    MCHC 33.6 31.5 - 36.5 g/dL    RDW 15.0 10.0 - 15.0 %    Platelet Count 321 150 - 450 10e3/uL    % Neutrophils 87 %    % Lymphocytes 2 %    % Monocytes 9 %    % Eosinophils 0 %    % Basophils 0 %    % Immature Granulocytes 2 %    NRBCs per 100 WBC 0 <1 /100    Absolute Neutrophils 15.7 (H) 1.6 - 8.3 10e3/uL    Absolute Lymphocytes 0.4 (L) 0.8 - 5.3 10e3/uL    Absolute Monocytes 1.7 (H) 0.0 - 1.3 10e3/uL    Absolute Eosinophils 0.0 0.0 - 0.7 10e3/uL    Absolute Basophils 0.0 0.0 - 0.2 10e3/uL    Absolute Immature Granulocytes  0.4 <=0.4 10e3/uL    Absolute NRBCs 0.0 10e3/uL   Lactic Acid STAT   Result Value Ref Range    Lactic Acid 0.6 (L) 0.7 - 2.0 mmol/L     COVID-19 PCR Results          7/14/2022    00:44 7/26/2023    14:52   COVID-19 PCR Results   SARS CoV2 PCR Negative  Negative      COVID-19 Antibody Results, Testing for Immunity           No data to display                Time Spent on this Encounter   I spent 45 minutes on the unit/floor managing the care of Iain Fuentes. 50% of my time was spent at the bedside counseling the patient and/or coordinating care regarding services listed in this note.    PARIS Watts, CNP  Hospitalist - House [facility]  Text me on the [employer] viviana for a textback  Text page with web based paging for a callback

## 2023-07-29 NOTE — PLAN OF CARE
Reason for Admission: R frontal SDH and L temporal bone fracture s/p fall   Cognitive/Mentation: MARIAMA assess orientation, pt not cooperative with neuro exam  Neuros/CMS: Moving all extremities, pupils equal and reactive. Generalized weakness, 4/5 strength. Slightly slurred speech. Difficult to assess, pt not following most commands.   VS: VSS on RA, SBP <150. On 1 L O2  Tele: NSR   GI: BS audible, + flatus, no BM. Continent.  : Voiding adequately in urinal at bedside. Continent.  Pulmonary: LS diminished, infrequent cough. On 1L o2  Pain: Pt unable to describe pain, cradles head/neck. PRN IV dilaudid available q1h now until pt able to take oral meds  Drains/Lines: R port accessed, infusing NS at 125 mL/hr.   Skin: Intact with L proximal forehead abrasion, L elbow/shoulder abrasions and scabbing. Scattered bruising.   Activity: Assist x 1-2 with GBW.  Diet: NPO, pt not tolerating PO meds at this time per previous shift RN  Therapies recs: Pending  Discharge: Pending  Aggression Stoplight Tool: Red - confused and impulsive, setting off bed alarm frequently, pt will have a sitter for day shift    End of shift summary/plan: RRT called overnight, see provider note. Head CT stable, increased pain meds effective. EEG ordered, pending.     Ph, K, Mg redrawn this AM WDL, recheck tomorrow AM ordered. LP pending.

## 2023-07-29 NOTE — PROGRESS NOTES
[facility]    Medicine Progress Note - Hospitalist Service    Date of Admission:  7/26/2023    Assessment & Plan   Iain Fuentes is a 52 year old male who was brought to the emergency room after he was found down [place]. Upon admission he was found to have a SDH and later developed signs of sepsis. Patient's recent medical history is unclear due to him being a poor historian. By chart review he followed with ENT 05/2023. His cancer was considered resolved. 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 It does not appear patient ever had that done and he his chart notes that presented to his PCP and ortho clinic for ongoing neck pain (no discussion of his ulcer noted) He next presented to [facility] via EMS with his fall. His PEG tube has been removed and he is malnourished with at least 10 pound weight loss in 2-3 months. Events of the last 2-3 months are unclear.        Unwitnessed fall  Possible syncope  Subdural hematoma with 1 to 2 mm leftward midline shift of the septum pellucidum   Nondisplaced skull fracture to the L sphenoid and R maxillary sinus  *CT head on admission showed thin subdural hemorrhage along the right frontal convexity measuring up to 1 cm maximum with 1 to 2 mm leftward midline shift.  Also was found to have nondisplaced fracture of the skull.  *Repeat CT head with questionable subtle increase in SDH size but otherwise no change. Subsequent CT with stable finding.     - Reviewed case with ENT on admission, Patient follows with Dr. Madison and is well known to their service. Appreciate their assistance in piecing together his recent medical history. Follow up Temporal and facial CT ordered at their request with results demonstrating R maxillary sinus fracture however no temporal bone fracture.  - BP goal <150 with HOB >30 degrees  - q4h neuro check  - Pain  management with IV dilaudid.     Bacteremia and Sepsis  Epidural abscess, C1-C7, encasing the cervical cord  Pharyngeal wall ulceration extending to vertebra, likely infected  C1 osteomyelitis  Patient presented with fever and hypotension with normal WBC count and normal lactate but elevated procal. UA clear and CXR clear.  - blood cultures from admission grew dialister species. Was started on zosyn.   -ID consulted, antibiotic changed to meropenem and vancomycin.  -Follow subsequent blood cultures  -TTE pending, likely needs GERSON based on finding, which if needed will have to be arranged under [place]  -Discussed with neurosurgery, not recommending surgery at this point.  Also no plan for thoracic MRI per neurosurgery given extent of abscess down to C7.  Since no plan for surgery, LP if recommended by ID.     Possible multifocal stroke, suspected due to endocarditis  Acute encephalopathy, multifactorial, due to sepsis, SDH, stroke  MRI brain showed Multiple small areas of diffusion restriction involving the left frontal lobe, left temporal lobe, right parietal lobe with an left lateral midbrain corresponding susceptibility related signal loss in the left frontal lobe and right parietal lobe raising concern for traumatic diffuse axonal injury. Subtle infarcts with petechial hemorrhage not excluded. The right parietal lesions could represent cortical contusions.  - Stroke neurology consuled, appreciate assistance  - telemetry, FLP, HbA1C  - serial neuro checks, BP goals per neurology  - EEG pending  - given SDH no asa/AC  - Abx as above    Squamous cell carcinoma of posterior pharynx  Severe malnutrition  Definitve diagnosis by biopsy 07/15/2022. Underwent chemo and radiation with [facility] and completed treatment late last year with complete resolution of cancer-plans for surveillance. Most recent ENT exam 05/2023 negative       - oncology visited with patient yesterday and also unclear why the PEG tube has been  "removed. Patient is well known to Dr. Dreyer but did not recognize him  -Given extent of pharyngeal ulcer, will not attempt any p.o. or SLP.  -Continue IVF, reviewed with ID and start TPN versus PEG.       Acute kidney injury  - Suspect this is prerenal, recent creatinine was normal, presented with a creatinine of 2.91  -Creatinine has improved to 0.94.  -Continue IV hydration     Acute anemia on chronic  -Hemoglobin at presentatio 8.0, last hemoglobin was 10.7 per scan notes from [place] [facility]  -Unable to get further history regarding bleeding  - oncology ordered further anemia labs and folate has returned low with low iron and high ferritin  - IV folate ordered  - monitor Hb, fairly stable at mid 7-8     Hypokalemia  Hypomagnesia  Replace per protocol     Essential hypertension  -Hold prior to admission antihypertensives given concern for sepsis and soft blood pressure.  -As needed IV hydralazine has been ordered for BP goal       Diet: NPO for Medical/Clinical Reasons Except for: Meds    DVT Prophylaxis: PCDs  King Catheter: Not present  Lines: PRESENT      Port A Cath Single 08/23/22 Right Chest wall-Site Assessment: WDL      Cardiac Monitoring: ACTIVE order. Indication: sepsis  Code Status: Full Code      Clinically Significant Risk Factors                   # Cachexia: Estimated body mass index is 16.62 kg/m  as calculated from the following:    Height as of this encounter: 1.676 m (5' 6\").    Weight as of this encounter: 46.7 kg (103 lb)., PRESENT ON ADMISSION  # Severe Malnutrition: based on nutrition assessment, PRESENT ON ADMISSION        Disposition Plan     Expected Discharge Date: 07/29/2023                  Alexus Lawton MD  Hospitalist Service  [facility]  Securely message with [employer] (more info)  Text page via [facility] Paging/Directory   ______________________________________________________________________    Interval History   Chart reviewed, overnight events " reviewed, discussed with nursing staff and evaluated patient this morning.  Patient was restless due to pain but at the same time in between, he would be sleepy and with apneic spells and RRT was called.  Pain medication was adjusted overnight.  Patient remains comfortable but slightly sedated versus encephalopathy due to underlying complex medical conditions.  He was able to express pain but could not localize.  I was unable to obtain any pertinent history from him.    Physical Exam   Vital Signs: Temp: 98.4  F (36.9  C) Temp src: Axillary BP: 134/85 Pulse: 106   Resp: 16 SpO2: 96 % O2 Device: Nasal cannula Oxygen Delivery: 1 LPM  Weight: 103 lbs 0 oz    General: Somnolent but arousable, restless when awake.  HEENT: PERRLA, mucosa is very dry. Laceration to Lt scalp, swollen neck posteriorly with erythema of the skin.  Lungs: Bilateral equal air entry. Coarse breath sounds, normal work of breathing.   Chest: Port in place, IV infusing.  CVS: S1S2 regular, mild tachycardia, no murmur.   Abdomen: Soft, NT, ND. BS heard.  MSK: No edema or deformities.  Neuro: Somnolent, face symmetrical, moving all extremities, unable to assess if symmetrical strength    Skin: No rash.       Medical Decision Making       55 MINUTES SPENT BY ME on the date of service doing chart review, history, exam, documentation & further activities per the note.   Discussed with patient's brother, RN, neurologist, neurosurgery.    Data     I have personally reviewed the following data over the past 24 hrs:    18.2 (H)  \   7.8 (L)   / 321     143 108 (H) 20.8 (H) /  87   3.7 20 (L) 0.94 \     Procal: N/A CRP: 283.40 (H) Lactic Acid: 0.6 (L)         Imaging results reviewed over the past 24 hrs:   Recent Results (from the past 24 hour(s))   MR Cervical Spine w/o & w Contrast    Narrative    MRI BRAIN AND CERVICAL SPINE: WITHOUT AND WITH CONTRAST  7/28/2023  4:05 PM    HISTORY: Subdural hematoma, head injury, fevers, abnormal neck CT    TECHNIQUE:   Multiplanar, multisequence MRI of the brain without and  with 5mL Gadavist    COMPARISON: Head CT 7/28/2023    FINDINGS:    BRAIN MRI:  Acute subdural hematoma along the right convexity with mild mass  effect, not appreciably changed.    Multiple small areas of diffusion restriction are present involving  the left frontal subcortical white matter. Small area of diffusion  restriction involving the left lateral temporal lobe. Small areas of  diffusion restriction involving the right parietal cortex. Suspected  small area of diffusion resection involving the left lateral midbrain.    T2 gradient echo imaging demonstrates subtle areas of susceptibility  related signal loss corresponding to diffusion restriction within the  left frontal white matter and involving the right parietal cortex.    Background of mild generalized parenchymal volume loss is present with  areas of white matter T2 hyperintense signal which likely represent  mild chronic small vessel ischemic change. Old periventricular infarct  along the frontal horn of the left lateral ventricle.    Abnormal marrow signal involving the upper cervical spine (refer to  cervical spine report). Mild paranasal sinus mucosal thickening. Fluid  signal within the left frontal and mastoid cavities, presumably  inflammatory. Soft tissue swelling with edema and enhancement  involving the visualized posterior upper neck musculature (refer to  cervical spine report).    CERVICAL SPINE MRI:    Posterior pharyngeal wall ulceration extending to the C1 ring.    Abnormal T1 hypointense signal, T2 hyperintense signal, and  enhancement within the C1 vertebra (right worse than left) with  cortical erosion consistent with osteomyelitis.    Widespread abnormal soft tissue swelling and fluid/edema throughout  the deep spaces of the neck involving the prevertebral space,  bilateral carotid spaces, parapharyngeal spaces, and extensively the  paraspinal musculature, particularly the  posterior paraspinal  musculature. Prevertebral fluid and appears ill-defined with  heterogeneous marginal enhancement (drainability uncertain).  Prevertebral fluid signal extends to the level of approximately T1-T2  at the superior mediastinum.    Spinal canal epidural abscess is present extending from C1 through  approximately C7 which is thickest at the level of C2 (series 12 image  10). The abscess encases the cervical cord and contributes to moderate  spinal canal stenoses. No convincing cord signal signal abnormality.    Superimposed degenerative changes are present contributing to spinal  canal stenoses at multiple levels. Multilevel moderate to severe  degenerative disease and facet arthropathy. Severe foraminal stenoses  at multiple levels. Alignment is significant for reversal of normal  cervical lordosis and multilevel grade 1 spondylolisthesis.    The visualized oral cavity, oropharynx, larynx, and salivary glands  demonstrate posttreatment changes.      Impression    IMPRESSION:    BRAIN MRI:   1. Acute subdural hematoma along the right convexity with mild mass  effect, unchanged compared to head CT from 7/28/2023.  2. Multiple small areas of diffusion restriction involving the left  frontal lobe, left temporal lobe, right parietal lobe with an left  lateral midbrain corresponding susceptibility related signal loss in  the left frontal lobe and right parietal lobe raising concern for  traumatic diffuse axonal injury. Subtle infarcts with petechial  hemorrhage not excluded. The right parietal lesions could represent  cortical contusions.  3. 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    CERVICAL SPINE MRI:  1.  Posterior pharyngeal wall ulceration extending to the C1 vertebra  with marrow signal changes involving the C1 vertebra consistent with  osteomyelitis.  2. Epidural abscess extending from C1 through C7 thickest at C2  encasing the  cervical cord. No convincing cord signal abnormalities.  3. Widespread fluid stranding enhancement throughout the deep spaces  of the neck, concerning for extensive multicompartment infection.  4. The soft tissue changes extensively involve the posterior  paraspinal musculature, and sequela of superimposed rhabdomyolysis  cannot be excluded which can be drug-induced.  5. Superimposed traumatic muscular/ligamentous strain cannot be  excluded.    PINKY ENGLAND MD         SYSTEM ID:  [id number]   MR Brain w/o & w Contrast    Narrative    MRI BRAIN AND CERVICAL SPINE: WITHOUT AND WITH CONTRAST  7/28/2023  4:05 PM    HISTORY: Subdural hematoma, head injury, fevers, abnormal neck CT    TECHNIQUE:  Multiplanar, multisequence MRI of the brain without and  with 5mL Gadavist    COMPARISON: Head CT 7/28/2023    FINDINGS:    BRAIN MRI:  Acute subdural hematoma along the right convexity with mild mass  effect, not appreciably changed.    Multiple small areas of diffusion restriction are present involving  the left frontal subcortical white matter. Small area of diffusion  restriction involving the left lateral temporal lobe. Small areas of  diffusion restriction involving the right parietal cortex. Suspected  small area of diffusion resection involving the left lateral midbrain.    T2 gradient echo imaging demonstrates subtle areas of susceptibility  related signal loss corresponding to diffusion restriction within the  left frontal white matter and involving the right parietal cortex.    Background of mild generalized parenchymal volume loss is present with  areas of white matter T2 hyperintense signal which likely represent  mild chronic small vessel ischemic change. Old periventricular infarct  along the frontal horn of the left lateral ventricle.    Abnormal marrow signal involving the upper cervical spine (refer to  cervical spine report). Mild paranasal sinus mucosal thickening. Fluid  signal within the left frontal and  mastoid cavities, presumably  inflammatory. Soft tissue swelling with edema and enhancement  involving the visualized posterior upper neck musculature (refer to  cervical spine report).    CERVICAL SPINE MRI:    Posterior pharyngeal wall ulceration extending to the C1 ring.    Abnormal T1 hypointense signal, T2 hyperintense signal, and  enhancement within the C1 vertebra (right worse than left) with  cortical erosion consistent with osteomyelitis.    Widespread abnormal soft tissue swelling and fluid/edema throughout  the deep spaces of the neck involving the prevertebral space,  bilateral carotid spaces, parapharyngeal spaces, and extensively the  paraspinal musculature, particularly the posterior paraspinal  musculature. Prevertebral fluid and appears ill-defined with  heterogeneous marginal enhancement (drainability uncertain).  Prevertebral fluid signal extends to the level of approximately T1-T2  at the superior mediastinum.    Spinal canal epidural abscess is present extending from C1 through  approximately C7 which is thickest at the level of C2 (series 12 image  10). The abscess encases the cervical cord and contributes to moderate  spinal canal stenoses. No convincing cord signal signal abnormality.    Superimposed degenerative changes are present contributing to spinal  canal stenoses at multiple levels. Multilevel moderate to severe  degenerative disease and facet arthropathy. Severe foraminal stenoses  at multiple levels. Alignment is significant for reversal of normal  cervical lordosis and multilevel grade 1 spondylolisthesis.    The visualized oral cavity, oropharynx, larynx, and salivary glands  demonstrate posttreatment changes.      Impression    IMPRESSION:    BRAIN MRI:   1. Acute subdural hematoma along the right convexity with mild mass  effect, unchanged compared to head CT from 7/28/2023.  2. Multiple small areas of diffusion restriction involving the left  frontal lobe, left temporal lobe,  right parietal lobe with an left  lateral midbrain corresponding susceptibility related signal loss in  the left frontal lobe and right parietal lobe raising concern for  traumatic diffuse axonal injury. Subtle infarcts with petechial  hemorrhage not excluded. The right parietal lesions could represent  cortical contusions.  3. 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    CERVICAL SPINE MRI:  1.  Posterior pharyngeal wall ulceration extending to the C1 vertebra  with marrow signal changes involving the C1 vertebra consistent with  osteomyelitis.  2. Epidural abscess extending from C1 through C7 thickest at C2  encasing the cervical cord. No convincing cord signal abnormalities.  3. Widespread fluid stranding enhancement throughout the deep spaces  of the neck, concerning for extensive multicompartment infection.  4. The soft tissue changes extensively involve the posterior  paraspinal musculature, and sequela of superimposed rhabdomyolysis  cannot be excluded which can be drug-induced.  5. Superimposed traumatic muscular/ligamentous strain cannot be  excluded.    PINKY ENGLAND MD         SYSTEM ID:  [id number]   CT Head w/o Contrast    Narrative    EXAM: CT HEAD W/O CONTRAST  LOCATION: [facility]  DATE: 7/28/2023    INDICATION: CT brain 7/28/2023.    COMPARISON: CT brain 7/28/2023 at 0540 hours.    TECHNIQUE: Routine CT head without IV contrast. Multiplanar reformats. Dose reduction techniques were used.    FINDINGS:  INTRACRANIAL CONTENTS: Stable high-attenuation subdural hematomas again seen extending along the right frontal parietal and temporal inner table. The collection measures up to 7 mm in thickness in the coronal plane on both the current and prior. There is   associated mass effect with subtle leftward bowing of the septum pellucidum, as seen previously. Gray-white matter differentiation is preserved. Mild  prominence of the lateral ventricles. Cerebellar tonsils are normally positioned. Sella is unremarkable   for technique. Corpus callosum is normally formed.    VISUALIZED ORBITS/SINUSES/MASTOIDS: Orbits are normal in appearance. Dependent fluid is seen within the left maxillary and both sphenoid sinuses compatible with active sinus disease or layering blood products. Mild ethmoid and left frontal sinus mucosal   thickening. Middle ear cavities and mastoid air cells are clear.    BONES/SOFT TISSUES: Stable appearance of nondisplaced left temporal fracture extending superiorly along the left frontal sphenoid synchondrosis.      Impression    IMPRESSION: Stable appearance of hyperattenuating right frontal parietal and temporal subdural hematoma. No finding for interval hemorrhage.     CT Head w/o Contrast    Narrative    EXAM: CT HEAD WITHOUT CONTRAST  LOCATION: [facility]  DATE: 07/29/2023    INDICATION: Increased pain and agitation, unchanged with analgesia.  COMPARISON: CT head 07/28/2023.  TECHNIQUE: Routine CT Head without IV contrast. Multiplanar reformats. Dose reduction techniques were used.    FINDINGS:  Right convexity subdural hematoma is stable to subtly decreased in thickness. No new hemorrhage or mass effect. Volume loss and presumed chronic small vessel ischemia is stable. No new hemorrhage. No acute infarct. Remainder unchanged.       Impression    IMPRESSION:  1. Stable to subtle decrease in right convexity subdural hemorrhage.

## 2023-07-29 NOTE — CONSULTS
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 Subsequently he noted to be in sepsis. He was also noted to have posterior non-healing pharyngeal ulcer thought to be secondary to radiation. Further workup revealed C1 osteomyelitis epidural abscess C1-C7 encasing the cervical cord with bacteremia and sepsis.     On 07/29 RR activated for episodes of somnolence and apnea with intermittent yelling and hyperactivity.  EEG ordered for concern of seizure- completed-report pending. MRI brain completed revealing small areas of diffusion restriction in left  frontal lobe, left temporal lobe, right parietal lobe with an left lateral midbrain.     Impression   Traumatic R SDH  Cervical spine abscess  Multifocal infarcts suspected to be in setting of underlying endocarditis (positive bacteremia, pending echo)  ? Seizure (episodes of apnea and somnolence, SDH and current infection puts him at risk.    Recommendations  - Use orderset: \"Ischemic Stroke Routine Admission\" or \"Ischemic Stroke No Thrombolytics/No Thrombectomy ICU Admission\"  - Neurochecks and Vital Signs every 4h   - Permissive HTN; goal SBP < 220 mmHg  - Telemetry, EKG  - Bedside Glucose Monitoring  - A1c, Lipid Panel, Troponin x 3  - PT/OT/SLP  - Euthermia, Euglycemia  - Follow TTE, may require GERSON  - Antibiotics per ID team  - Recommend MRI T/L spine w wo contrast to evaluate the extent of infection when patient stable to go to scanner  - EEG report pending, if recurrent spells may requiring cEEG  -Continue Keppra BID    Patient Follow-up     - final recommendation pending work-up    Thank you for this " "consult. We will continue to follow.      The Stroke Staff is Dr. Bess.    July Capone MD  Vascular Neurology Fellow    To page me or covering stroke neurology team member, click here: AMCOM  Choose \"On Call\" tab at top, then select \"NEUROLOGY/ALL SITES\" from middle drop-down box, press Enter, then look for \"stroke\" or \"telestroke\" for your site.  ______________________________________________________    Clinically Significant Risk Factors        # Hypokalemia: Lowest K = 3.2 mmol/L in last 2 days, will replace as needed     # Hypomagnesemia: Lowest Mg = 1.5 mg/dL in last 2 days, will replace as needed   # Hypoalbuminemia: Lowest albumin = 2.6 g/dL at 7/26/2023  1:13 PM, will monitor as appropriate     # Hypertension: Noted on problem list        # Cachexia: Estimated body mass index is 16.62 kg/m  as calculated from the following:    Height as of this encounter: 1.676 m (5' 6\").    Weight as of this encounter: 46.7 kg (103 lb)., PRESENT ON ADMISSION  # Severe Malnutrition: based on nutrition assessment, PRESENT ON ADMISSION          Past Medical History   Past Medical History:   Diagnosis Date    Alcohol abuse     Hypertension     Lumbar herniated disc     Marijuana dependence (H)      Past Surgical History   Past Surgical History:   Procedure Laterality Date    ARTHROSCOPY KNEE WITH MENISCUS ALLOGRAFT Left     EXCISE LESION INTRAORAL N/A 7/15/2022    Procedure: 1.  Direct laryngoscopy. 2.  Evaluation under anesthesia with biopsies;  Surgeon: Austin Kate MD;  Location: [facility] OR    IR CHEST PORT PLACEMENT > 5 YRS OF AGE  8/23/2022    [facility] NONSPECIFIC PROCEDURE  1997    facial surgery/repair after trauma     Medications   Home Meds  Prior to Admission medications    Medication Sig Start Date End Date Taking? Authorizing Provider   Acetaminophen (TYLENOL ARTHRITIS PAIN PO) Take 2 tablets by mouth daily as needed (pain)   Yes Unknown, Entered By History   cyclobenzaprine (FLEXERIL) 10 MG tablet Take 10 mg by mouth " 3 times daily as needed for muscle spasms   Yes Unknown, Entered By History   gabapentin (NEURONTIN) 100 MG capsule Take 200 mg by mouth 2 times daily Med last filled   Yes Unknown, Entered By History   ibuprofen (ADVIL/MOTRIN) 200 MG tablet Take 200 mg by mouth every 6 hours as needed for pain   Yes Unknown, Entered By History   lisinopril-hydrochlorothiazide (ZESTORETIC) 20-12.5 MG tablet Take 1 tablet by mouth daily 4/7/23  Yes Agustin Ortiz MD   OLANZapine (ZYPREXA) 5 MG tablet Take 5 mg by mouth nightly as needed (sleep)   Yes Unknown, Entered By History   ondansetron (ZOFRAN ODT) 8 MG ODT tab Take 8 mg by mouth every 8 hours as needed for nausea or other (vomiting)   Yes Unknown, Entered By History   amLODIPine (NORVASC) 5 MG tablet TAKE 1 TABLET BY MOUTH EVERY DAY  Patient not taking: Reported on 7/26/2023 4/7/23   Agustin Ortiz MD       Scheduled Meds   folic acid  1 mg Intravenous Daily    gabapentin  200 mg Oral BID    heparin  5-10 mL Intracatheter Q28 Days    heparin lock flush  5-10 mL Intracatheter Q24H    levETIRAcetam  500 mg Intravenous Q12H    meropenem  2 g Intravenous Q8H    sodium chloride (PF)  10-20 mL Intracatheter Q28 Days    sodium chloride (PF)  3 mL Intracatheter Q8H    sodium chloride (PF)  3 mL Intracatheter Q8H    vancomycin  750 mg Intravenous Q12H       Infusion Meds   sodium chloride 125 mL/hr at 07/29/23 1211       PRN Meds  acetaminophen **OR** acetaminophen, heparin lock flush, HYDROmorphone, labetalol, lidocaine 4%, lidocaine 4%, lidocaine (buffered or not buffered), lidocaine (buffered or not buffered), naloxone **OR** naloxone **OR** naloxone **OR** naloxone, nitroGLYcerin, OLANZapine, ondansetron **OR** ondansetron, oxyCODONE, sodium chloride (PF), sodium chloride (PF), sodium chloride (PF), sodium chloride (PF)    Allergies   No Known Allergies  Family History   Family History   Problem Relation Age of Onset    Arthritis Father     Blood Disease Father          amyloidosis    Depression Mother         committed suicide     Hypertension Brother     Cancer Paternal Grandmother          of lung cancer    Gastrointestinal Disease Paternal Grandfather         pancreatic disorder     Social History   Social History     Tobacco Use    Smoking status: Every Day     Packs/day: 1.00     Years: 20.00     Pack years: 20.00     Types: Cigarettes     Start date: 12/15/1985    Smokeless tobacco: Never   Substance Use Topics    Alcohol use: Yes    Drug use: Yes     Types: Marijuana         PHYSICAL EXAMINATION  Temp:  [98.3  F (36.8  C)-101.6  F (38.7  C)] 98.3  F (36.8  C)  Pulse:  [] 100  Resp:  [7-24] 7  BP: (113-160)/() 136/91  SpO2:  [95 %-100 %] 96 %     Limited exam given patient had received dilaudid. Agitated and restless due to pain  Neurologic  Mental Status:   alert to vocal stimuli, not following commands  Cranial Nerves:   EOMI intact, face appears symmetric, mostly mute or garbled speech.  Motor:   atleast antigravity in all four spontaneously  Sensory:  light touch sensation intact and symmetric throughout upper and lower extremities    Stroke Scales    NIHSS  1a. Level of Consciousness 0-->Alert, keenly responsive   1b. LOC Questions 2-->Answers neither question correctly   1c. LOC Commands 2-->Performs neither task correctly   2.   Best Gaze 0-->Normal   3.   Visual 0-->No visual loss   4.   Facial Palsy 0-->Normal symmetrical movements   5a. Motor Arm, Left 1-->Drift, limb holds 90 (or 45) degrees, but drifts down before full 10 seconds, does not hit bed or other support   5b. Motor Arm, Right 1-->Drift, limb holds 90 (or 45) degrees, but drifts down before full 10 secs, does not hit bed or other support   6a. Motor Leg, Left 1-->Drift, leg falls by the end of the 5-sec period but does not hit bed   6b. Motor Leg, right 1-->Drift, leg falls by the end of the 5-sec period but does not hit bed   7.   Limb Ataxia 0-->Absent   8.   Sensory  0-->Normal, no sensory loss   9.   Best Language 3-->Mute, global aphasia, no usable speech or auditory comprehension   10. Dysarthria 2-->Severe dysarthria, patients speech is so slurred as to be unintelligible in the absence of or out of proportion to any dysphasia, or is mute/anarthric   11. Extinction and Inattention  0-->No abnormality   Total 13 (07/29/23 1400)       Imaging  I personally reviewed all imaging; relevant findings per HPI.     Lab Results Data   CBC  Recent Labs   Lab 07/29/23  0449 07/28/23  0331 07/27/23  0843   WBC 18.2* 13.6* 12.6*   RBC 2.45* 2.36* 2.53*   HGB 7.8* 7.6* 8.0*   HCT 23.2* 22.5* 24.0*    266 333     Basic Metabolic Panel    Recent Labs   Lab 07/29/23  0450 07/29/23  0349 07/28/23  2250 07/28/23  2109 07/28/23  1637 07/28/23  0331 07/27/23  1818 07/27/23  0843     --   --   --   --  135*  --  139   POTASSIUM 3.7  --  3.8  --   --  3.2*   < > 3.0*   CHLORIDE 108*  --   --   --   --  102  --  103   CO2 20*  --   --   --   --  21*  --  21*   BUN 20.8*  --   --   --   --  32.7*  --  39.4*   CR 0.94  --   --   --   --  1.43*  --  2.06*   GLC 87 73  --  91   < > 88  --  94   TOSHA 8.8  --   --   --   --  8.1*  --  7.9*    < > = values in this interval not displayed.     Liver Panel  Recent Labs   Lab 07/26/23  1313   PROTTOTAL 5.5*   ALBUMIN 2.6*   BILITOTAL 0.4   ALKPHOS 241*   AST 26   ALT 25     INR    Recent Labs   Lab Test 08/23/22  1242   INR 0.96      Lipid Profile    Recent Labs   Lab Test 02/25/21  0822 08/06/18  0936 01/26/17  1547   CHOL 191 211* 232*   HDL 91 45 53   LDL 88 149* 165*   TRIG 61 85 71     A1C  No lab results found.  Troponin  No results for input(s): CTROPT, TROPONINIS, TROPONINI, GHTROP in the last 168 hours.

## 2023-07-29 NOTE — PLAN OF CARE
SLP: Orders received and chart reviewed. Per nurse patient is not appropriate at this time due to pain and not following.

## 2023-07-29 NOTE — PROGRESS NOTES
Neurosurgery Progress     MRI BRAIN AND CERVICAL SPINE: WITHOUT AND WITH CONTRAST  7/28/2023  4:05 PM    BRAIN MRI:   1. Acute subdural hematoma along the right convexity with mild mass  effect, unchanged compared to head CT from 7/28/2023.  2. Multiple small areas of diffusion restriction involving the left  frontal lobe, left temporal lobe, right parietal lobe with an left  lateral midbrain corresponding susceptibility related signal loss in  the left frontal lobe and right parietal lobe raising concern for  traumatic diffuse axonal injury. Subtle infarcts with petechial  hemorrhage not excluded. The right parietal lesions could represent  cortical contusions.  3. 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     CERVICAL SPINE MRI:  1.  Posterior pharyngeal wall ulceration extending to the C1 vertebra  with marrow signal changes involving the C1 vertebra consistent with  osteomyelitis.  2. Epidural abscess extending from C1 through C7 thickest at C2  encasing the cervical cord. No convincing cord signal abnormalities.  3. Widespread fluid stranding enhancement throughout the deep spaces  of the neck, concerning for extensive multicompartment infection.  4. The soft tissue changes extensively involve the posterior  paraspinal musculature, and sequela of superimposed rhabdomyolysis  cannot be excluded which can be drug-induced.  5. Superimposed traumatic muscular/ligamentous strain cannot be  excluded.    ID note: Blood cxs are positive for gram negative cocci. 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     TODAY'S PLAN:     I spoke to Dr. Casarez and reviewed all imaging with him. At this time he is not recommending surgical intervention, rather continuation of antibiotics. He is fine with an LP if needed.      In  "the event that patient's symptoms worsen or change we would appreciate being contacted.     /81   Pulse 102   Temp 98.3  F (36.8  C) (Axillary)   Resp 16   Ht 1.676 m (5' 6\")   Wt 46.7 kg (103 lb)   SpO2 95%   BMI 16.62 kg/m       Pt in bed. Currently prepping for EEG. Was recently given dilaudid so a bit somnolent.  Appears comfortable now but was, per report, in a lot of pain prior to pain medication. ZIMMERMAN.  Will follow some simple commands. Confused.   Calves soft and non-tender bilaterally.     All pertinent labs and updated imaging reviewed in EPIC.     Kumar Burr PA-C   Neurosurgery   [phone] (P)     Reviewed with Dr. Casarez.    " H

## 2023-07-29 NOTE — PLAN OF CARE
Goal Outcome Evaluation:         Shift: 7/29/2023 - 07:00-19:30     Orientation: MARIAMA -- agitated and anxious throughout shift     Vitals/Tele: VSS on RA; SBP<150; tele NSR; consistent pain throughout pain with verbal exclamations -- receiving Dilaudid PRN -- see eMAR.      IV Access/drain: Port to right chest wall infusing NS @ 125 ml/hr     Diet: NPO ex. meds     Mobility: Ax1 gb (uses urinal to void at bedside)     GI/: voids in urinal at bedside w/ adequate output      Wound/Skin: scattered bruising; abrasions to left scalp, elbow, and shoulder from fall PTA     Consults: Hospitalist, Neurosurgery, ENT, and Oncology all following     Discharge Plan: TBD     Shift Note: PRN Dilaudid available Q1h -- given roughly every hour throughout the shift. EEG completed ~12:30. On K, Mg, and Ph protocols -- all rechecks ordered for tomorrow AM. Unable to obtain multiple aspects of neuro assessments d/t inability to follow commands and somnolence d/t effects of Dilaudid. Hospitalist increased dosage of Dilaudid from 0.5 mg To 0.6 mg ~ 18:15.         See Flow sheets for assessment

## 2023-07-29 NOTE — PHARMACY-VANCOMYCIN DOSING SERVICE
Pharmacy Vancomycin Note  Date of Service 2023  Patient's  1970   52 year old, male    Indication: Abscess  Day of Therapy: 2  Current vancomycin regimen:  1000 mg IV q24h  Current vancomycin monitoring method: Trough (Method 2 = manual dose calculation)  Current vancomycin therapeutic monitoring goal: 15-20 mg/L    Current estimated CrCl = Estimated Creatinine Clearance: 60.7 mL/min (based on SCr of 0.94 mg/dL).    Creatinine for last 3 days  2023:  1:13 PM Creatinine 2.91 mg/dL  2023:  8:43 AM Creatinine 2.06 mg/dL  2023:  3:31 AM Creatinine 1.43 mg/dL  2023:  4:50 AM Creatinine 0.94 mg/dL    Recent Vancomycin Levels (past 3 days)  No results found for requested labs within last 3 days.    Vancomycin IV Administrations (past 72 hours)                     vancomycin (VANCOCIN) 1,250 mg in 0.9% NaCl 250 mL intermittent infusion (mg) 1,250 mg New Bag 23 1851    vancomycin (VANCOCIN) 1,250 mg in 0.9% NaCl 250 mL intermittent infusion (mg) 1,250 mg New Bag 23 1748                    Nephrotoxins and other renal medications (From now, onward)      Start     Dose/Rate Route Frequency Ordered Stop    23 1330  vancomycin (VANCOCIN) 750 mg in sodium chloride 0.9 % 250 mL intermittent infusion         750 mg  over 90 Minutes Intravenous EVERY 12 HOURS 23 1306                 Contrast Orders - past 72 hours (72h ago, onward)      Start     Dose/Rate Route Frequency Stop    23 1530  gadobutrol (GADAVIST) injection 5 mL         5 mL Intravenous ONCE 23 1559    23 1000  iopamidol (ISOVUE-370) solution 100 mL         100 mL Intravenous ONCE 23 0956    23 0900  iopamidol (ISOVUE-370) solution 100 mL  Status:  Discontinued         100 mL Intravenous ONCE 23 0908            Interpretation of levels and current regimen:  Vancomycin level is reflective of  No level obtained after 1st dose.     Has serum creatinine changed greater than 50%  in last 72 hours: Yes    Urine output:  good urine output    Renal Function: Improving      Plan:  Increase Dose to 750 mg q12h   Vancomycin monitoring method: Trough (Method 2 = manual dose calculation)  Vancomycin therapeutic monitoring goal: 15-20 mg/L  Pharmacy will check vancomycin levels as appropriate in 1-3 Days.  Serum creatinine levels will be ordered daily for the first week of therapy and at least twice weekly for subsequent weeks.    Lois Guzmán RPH, PharmD, BCPS

## 2023-07-30 NOTE — PROGRESS NOTES
[facility]    Medicine Progress Note - Hospitalist Service    Date of Admission:  7/26/2023    Assessment & Plan   Iain Fuentes is a 52 year old male who was brought to the emergency room after he was found [place] [employer]. Upon admission he was found to have a SDH and later developed signs of sepsis. Patient's recent medical history is unclear due to him being a poor historian. By chart review he followed with ENT 05/2023. His cancer was considered resolved. 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 It does not appear patient ever had that done and he his chart notes that presented to his PCP and ortho clinic for ongoing neck pain (no discussion of his ulcer noted) He next presented to [facility] via EMS with his fall. His PEG tube has been removed and he is malnourished with at least 10 pound weight loss in 2-3 months. Events of the last 2-3 months are unclear.        Unwitnessed fall  Possible syncope  Subdural hematoma with 1 to 2 mm leftward midline shift of the septum pellucidum   Nondisplaced skull fracture to the L sphenoid and R maxillary sinus  *CT head on admission showed thin subdural hemorrhage along the right frontal convexity measuring up to 1 cm maximum with 1 to 2 mm leftward midline shift.  Also was found to have nondisplaced fracture of the skull.  *Repeat CT head with questionable subtle increase in SDH size but otherwise no change. Subsequent CT with stable finding.     - Reviewed case with ENT on admission, Patient follows with Dr. Madison and is well known to their service. Appreciate their assistance in piecing together his recent medical history. Follow up Temporal and facial CT ordered at their request with results demonstrating R maxillary sinus fracture however no temporal bone fracture.  - BP goal <150 with HOB >30 degrees  - q4h neuro check  - Pain  management with IV dilaudid.  - EEG without epileptiform discharges     Bacteremia and Sepsis  Epidural abscess, C1-C7, encasing the cervical cord  Pharyngeal wall ulceration extending to vertebra, likely infected  C1 osteomyelitis  Suspect Endocarditis  Patient presented with fever and hypotension with normal WBC count and normal lactate but elevated procal. UA clear and CXR clear.  - blood cultures from admission grew dialister species. Was started on zosyn on admission and changed to meropenem and vancomycin   -ID consulted, vancomycin stopped. Meropenem alone.   -Follow subsequent blood cultures  -TTE pending, likely needs GERSON based on finding, which if needed will have to be arranged under GA  -Discussed with neurosurgery, not recommending surgical intervention at this point, no need for thoracic MRI per neurosurgery. Although he will likely to be re imaged in short interval for c spines, plan thoracic as well at that time  -Discussed with ID, no plan for LP at this point.      Possible multifocal stroke, suspected due to endocarditis  Acute encephalopathy, multifactorial, due to sepsis, SDH, stroke  MRI brain showed Multiple small areas of diffusion restriction involving the left frontal lobe, left temporal lobe, right parietal lobe with an left lateral midbrain corresponding susceptibility related signal loss in the left frontal lobe and right parietal lobe raising concern for traumatic diffuse axonal injury. Subtle infarcts with petechial hemorrhage not excluded. The right parietal lesions could represent cortical contusions.  - Stroke neurology consuled, appreciate assistance  - continue with telemetry, FLP, HbA1C  - serial neuro checks, BP goals per neurology  - EEG negative for electrogaphic seizure   - given SDH no asa/AC  - Abx as above    Squamous cell carcinoma of posterior pharynx  Severe malnutrition  Dysphagia  Definitve diagnosis by biopsy 07/15/2022. Underwent chemo and radiation with [facility]  "and completed treatment late last year with complete resolution of cancer-plans for surveillance. Most recent ENT exam 05/2023 negative       - oncology visited with patient yesterday and also unclear why the PEG tube has been removed. Patient is well known to Dr. Dreyer but did not recognize him  -Given extent of pharyngeal ulcer, will not attempt any p.o. or SLP.  -Continue IVF, avoid TPN given bacteremia. Likely needs PEG. Brother ok with PEG. Will have IR place in AM.        Acute kidney injury  Hypernatremia  - Suspect this is prerenal, recent creatinine was normal, presented with a creatinine of 2.91  -Creatinine has improved to 0.7  -Continue IV hydration, IVF changed to hypotonic saline given hypernatremia     Acute anemia on chronic  Folate deficiency  -Hemoglobin at presentatio 8.0, last hemoglobin was 10.7 per scan notes from [facility]  -Unable to get further history regarding bleeding  - oncology ordered further anemia labs and folate has returned low with low iron and high ferritin  - IV folate ordered  - Hb 6.9 today, no signs of bleeding, likely due to acute illness and dilutional  - Transfuse to keep Hb>7     Hypokalemia  Hypomagnesia  Replace per protocol    Essential hypertension  -Hold prior to admission antihypertensives given concern for sepsis and soft blood pressure.  -As needed IV hydralazine has been ordered for BP goal       Diet: NPO for Medical/Clinical Reasons Except for: Meds    DVT Prophylaxis: PCDs  King Catheter: Not present  Lines: PRESENT      Port A Cath Single 08/23/22 Right Chest wall-Site Assessment: WDL      Cardiac Monitoring: ACTIVE order. Indication: sepsis  Code Status: Full Code      Clinically Significant Risk Factors                   # Cachexia: Estimated body mass index is 16.62 kg/m  as calculated from the following:    Height as of this encounter: 1.676 m (5' 6\").    Weight as of this encounter: 46.7 kg (103 lb)., PRESENT ON ADMISSION    # Severe " "Malnutrition: based on nutrition assessment, PRESENT ON ADMISSION        Disposition Plan            Alexus Lawton MD  Hospitalist Service  [facility]  Securely message with [employer] (more info)  Text page via [email or web address] Paging/Directory   ______________________________________________________________________    Interval History     Evaluated patient this morning, more awake.  Keeps repeating \"no\" to almost all of the questions and unable to obtain pertinent ROS.  Afebrile overnight, remains tachycardic.  Moving all extremities.  -As needed IV pain medication regularly almost every hour.  No sedation.    Physical Exam   Vital Signs: Temp: 98.6  F (37  C) Temp src: Axillary BP: (!) 144/104 Pulse: (!) 122   Resp: 22 SpO2: 97 % O2 Device: None (Room air)    Weight: 103 lbs 0 oz    General: Alert awake, restless, not in distress  HEENT: PERRLA, mucosa is dry. Laceration to Lt scalp, swollen neck posteriorly with erythema of the skin mostly unchanged.  Lungs: Bilateral equal air entry. Coarse breath sounds, normal work of breathing.   Chest: Port in place, IV infusing.  CVS: S1S2 regular, mild tachycardia, no murmur.   Abdomen: Soft, NT, ND. BS heard.  MSK: No edema or deformities.  Neuro: alert awake, weak hand , face symmetrical, moving all extremities   Skin: No rash.       Medical Decision Making       60 MINUTES SPENT BY ME on the date of service doing chart review, history, exam, documentation & further activities per the note.   Discussed with patient's brother, RN, neurologist, ID    Data     I have personally reviewed the following data over the past 24 hrs:    14.6 (H)  \   6.9 (LL)   / 419     146 (H) 110 (H) 14.8 /  91   3.6 20 (L) 0.76 \     ALT: 14 AST: 20 AP: 135 (H) TBILI: 0.7   ALB: 2.3 (L) TOT PROTEIN: 5.1 (L) LIPASE: N/A     Procal: N/A CRP: N/A Lactic Acid: 0.7       Imaging results reviewed over the past 24 hrs:   No results found for this or any previous visit (from the " past 24 hour(s)).

## 2023-07-30 NOTE — PROGRESS NOTES
F/u note    Iain Fuentes MRN# [id number]   YOB: 1970 Age: 52 year old   Date of Admission: 7/26/2023  Requesting physician: Dr. Meza  Reason for consult: Head and neck cancer            Assessment and Plan:   1. Posterior non-healing pharyngeal ulcer  - Most likely secondary to radiation   - Follows with ENT, last exam with ENT 5/2023 without signs of malignancy  - Recommended to get hyperbaric oxygen chamber outpatient -> patient unable to get this done     2. 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  There was necrosis in the middle of the mass and minor oozing.  It extends into the inferior portion of the nasopharynx and extends down towards the level of the tongue base.  - 7/15/22 biopsy revealed invasive squamous cell carcinoma, p16 negative.  - Saw Dr. Madison outpatient, surgery would require bilateral neck dissection and resection of pharyngeal tumor with reconstruction, felt chemoradiation would be better route for definitive treatment   - Port placed  - G tube placed.   - Cycle 1 given 8/29-9/1, cycle 2 carboplatin/5FU given 9/19-9/23, cycle 3 10/24-10/27. Radiation from 8/31-10/26 (multiple treatment delays).   - F/U PET-CT 4/6/23 with increase in metabolic activity in posterior pharyngael mass and small superior left cervical lymph node  - 5/2023 ENT exam negative except for necrotic posterior pharynx ulcer     Plan   -Given the evidence that there is recurrence of head and neck malignancy.    3.  Epidural abscess   - Management as per primary team being followed by neurosurgery,  infection  disease      3. Weight loss  - At some point, his G tube was removed and he has been losing weight   - 114 pounds April 2023   - Secondary to odynophagia from painful ulcer     4. Altered mental status  - Likely secondary to subdural, epidural abscess    5.  Anemia  -Found to have A deficiency likely nutritional for catheter patient needs G-tube for nutrition.              Chief Complaint:   Fall           History of Present Illness:   This patient is a 52 year old male with history of head and neck cancer s/p chemoradiation ending 10/2022 and posterior pharyngeal ulcer as a complication of radiation who presented to the hospital on 7/26/23 after he fell at a [employer]. The patient was found to have a small subduram hemorrhage. He was admitted for further work up. The patient is altered and unable to offer meaningful information. He is not sure when his PEG tube was removed. He is losing weight. He denies fevers.     Patient is a 51-year-old male with history of hypertension who presented to us on 8/5/2022 courtesy of his ENT physician for the further evaluation of head and neck cancer.  Patient reports he has been having progressive painful swallowing.  He has been losing weight, about 20 pounds in the past 2 months.  He ultimately went to urgent care for this problem they started him on amoxicillin and sent him to the hospital.  In the hospital he was placed on azithromycin.  He was found to be in acute renal failure and was given IV fluids.  laryngoscopy was performed which revealed a mass of the posterior pharynx a biopsy revealed a squamous cell carcinoma that is p16 negative.    8/8/2022: PET scan Revealed large area of hypermetabolic soft tissue thickening involving the posterior oropharynx and extending superior to the soft palate and inferior, approximately to the level of the epiglottis.  This involves both tonsillar regions and is slightly more prominent on the left.  The area of high metabolic activity  "measures approximately 5.8 cm in width by 3.8 cm in AP dimension by 7.7 cm in craniocaudal dimension.  With an SUV max of 33.6 on the head and neck image and an SUV max of 22.6 on the whole-body images there is bilateral hypermetabolic cervical lymphadenopathy involving levels 2A/BN3.  Lymph nodes in these areas measure approximately 1.1 x 0.8 cm and demonstrate an SUV max of 4.4 on the head and neck images and SUV max of 3.6 on the whole-body images there is no convincing evidence of other areas of tumor involvement or metastatic disease    ChemoXRT with 5FU + carboplatin 8/31/22-10/26/22 complicated by moderate mucositis and YAYA's responding to IVF and supportive care     Interval history  -Patient very agitated and confused unable to have meaningful conversation.          Physical Exam:   Vitals were reviewed  Blood pressure (!) 160/97, pulse 99, temperature 98.5  F (36.9  C), temperature source Axillary, resp. rate 18, height 1.676 m (5' 6\"), weight 46.7 kg (103 lb), SpO2 100 %.    Malnourished   Aox2, disoriented, patient very agitated keep moaning   Abd soft NT ND               Past Medical History:   I have reviewed this patient's past medical history  Past Medical History:   Diagnosis Date     Alcohol abuse      Hypertension      Lumbar herniated disc      Marijuana dependence (H)              Past Surgical History:   I have reviewed this patient's past surgical history  Past Surgical History:   Procedure Laterality Date     ARTHROSCOPY KNEE WITH MENISCUS ALLOGRAFT Left      EXCISE LESION INTRAORAL N/A 7/15/2022    Procedure: 1.  Direct laryngoscopy. 2.  Evaluation under anesthesia with biopsies;  Surgeon: Austin Kate MD;  Location: [facility] OR     IR CHEST PORT PLACEMENT > 5 YRS OF AGE  8/23/2022     [facility] NONSPECIFIC PROCEDURE  1997    facial surgery/repair after trauma               Social History:   I have reviewed this patient's social history  Social History     Tobacco Use     Smoking status: Every Day "     Packs/day: 1.00     Years: 20.00     Pack years: 20.00     Types: Cigarettes     Start date: 12/15/1985     Smokeless tobacco: Never   Substance Use Topics     Alcohol use: Yes             Family History:   I have reviewed this patient's family history  Family History   Problem Relation Age of Onset     Arthritis Father      Blood Disease Father         amyloidosis     Depression Mother         committed suicide      Hypertension Brother      Cancer Paternal Grandmother          of lung cancer     Gastrointestinal Disease Paternal Grandfather         pancreatic disorder             Allergies:   No Known Allergies          Medications:   I have reviewed this patient's current medications  Medications Prior to Admission   Medication Sig Dispense Refill Last Dose     Acetaminophen (TYLENOL ARTHRITIS PAIN PO) Take 2 tablets by mouth daily as needed (pain)    at PRN     cyclobenzaprine (FLEXERIL) 10 MG tablet Take 10 mg by mouth 3 times daily as needed for muscle spasms   2023 at ~1200     gabapentin (NEURONTIN) 100 MG capsule Take 200 mg by mouth 2 times daily Med last filled   2023 at AM     ibuprofen (ADVIL/MOTRIN) 200 MG tablet Take 200 mg by mouth every 6 hours as needed for pain    at PRN     lisinopril-hydrochlorothiazide (ZESTORETIC) 20-12.5 MG tablet Take 1 tablet by mouth daily 90 tablet 0 2023 at AM     OLANZapine (ZYPREXA) 5 MG tablet Take 5 mg by mouth nightly as needed (sleep)   Past Month at PRN     ondansetron (ZOFRAN ODT) 8 MG ODT tab Take 8 mg by mouth every 8 hours as needed for nausea or other (vomiting)        amLODIPine (NORVASC) 5 MG tablet TAKE 1 TABLET BY MOUTH EVERY DAY (Patient not taking: Reported on 2023) 90 tablet 0 Not Taking at pt reports this was stopped ~2 wks ago             Review of Systems:                 Data:   Data   Results for orders placed or performed during the hospital encounter of 23 (from the past 24 hour(s))   Glucose by meter    Result Value Ref Range    GLUCOSE BY METER POCT 76 70 - 99 mg/dL   Glucose by meter   Result Value Ref Range    GLUCOSE BY METER POCT 70 70 - 99 mg/dL   Lactic Acid STAT   Result Value Ref Range    Lactic Acid 0.7 0.7 - 2.0 mmol/L   CBC with Platelets & Differential    Narrative    The following orders were created for panel order CBC with Platelets & Differential.  Procedure                               Abnormality         Status                     ---------                               -----------         ------                     CBC with platelets and d...[[id number]]  Abnormal            Final result                 Please view results for these tests on the individual orders.   Magnesium   Result Value Ref Range    Magnesium 1.4 (L) 1.7 - 2.3 mg/dL   Phosphorus   Result Value Ref Range    Phosphorus 2.0 (L) 2.5 - 4.5 mg/dL   Comprehensive metabolic panel   Result Value Ref Range    Sodium 146 (H) 136 - 145 mmol/L    Potassium 3.6 3.4 - 5.3 mmol/L    Chloride 110 (H) 98 - 107 mmol/L    Carbon Dioxide (CO2) 20 (L) 22 - 29 mmol/L    Anion Gap 16 (H) 7 - 15 mmol/L    Urea Nitrogen 14.8 6.0 - 20.0 mg/dL    Creatinine 0.76 0.67 - 1.17 mg/dL    Calcium 8.7 8.6 - 10.0 mg/dL    Glucose 91 70 - 99 mg/dL    Alkaline Phosphatase 135 (H) 40 - 129 U/L    AST 20 0 - 45 U/L    ALT 14 0 - 70 U/L    Protein Total 5.1 (L) 6.4 - 8.3 g/dL    Albumin 2.3 (L) 3.5 - 5.2 g/dL    Bilirubin Total 0.7 <=1.2 mg/dL    GFR Estimate >90 >60 mL/min/1.73m2   CBC with platelets and differential   Result Value Ref Range    WBC Count 14.6 (H) 4.0 - 11.0 10e3/uL    RBC Count 2.15 (L) 4.40 - 5.90 10e6/uL    Hemoglobin 6.9 (LL) 13.3 - 17.7 g/dL    Hematocrit 20.8 (L) 40.0 - 53.0 %    MCV 97 78 - 100 fL    MCH 32.1 26.5 - 33.0 pg    MCHC 33.2 31.5 - 36.5 g/dL    RDW 15.4 (H) 10.0 - 15.0 %    Platelet Count 419 150 - 450 10e3/uL    % Neutrophils 87 %    % Lymphocytes 3 %    % Monocytes 8 %    % Eosinophils 0 %    % Basophils 0 %    % Immature  Granulocytes 2 %    NRBCs per 100 WBC 0 <1 /100    Absolute Neutrophils 12.8 (H) 1.6 - 8.3 10e3/uL    Absolute Lymphocytes 0.4 (L) 0.8 - 5.3 10e3/uL    Absolute Monocytes 1.2 0.0 - 1.3 10e3/uL    Absolute Eosinophils 0.1 0.0 - 0.7 10e3/uL    Absolute Basophils 0.0 0.0 - 0.2 10e3/uL    Absolute Immature Granulocytes 0.3 <=0.4 10e3/uL    Absolute NRBCs 0.0 10e3/uL   ABO/Rh type and screen    Narrative    The following orders were created for panel order ABO/Rh type and screen.  Procedure                               Abnormality         Status                     ---------                               -----------         ------                     Adult Type and Screen[[id number]]                            Final result                 Please view results for these tests on the individual orders.   Adult Type and Screen   Result Value Ref Range    ABO/RH(D) A NEG     Antibody Screen Negative Negative    SPECIMEN EXPIRATION DATE [id number]

## 2023-07-30 NOTE — PLAN OF CARE
"Goal Outcome Evaluation:         Shift: 7/30/2023 - 07:00-19:30     Orientation: MARIAMA -- agitated and anxious throughout shift     Vitals/Tele: VSS on RA; SBP<150; tele NSR; consistent pain throughout pain with verbal exclamations -- receiving Dilaudid PRN -- see eMAR. Received labetalol x1 for elevated BP, which brought SBP down to 140s.      IV Access/drain: Port to right chest wall infusing D5 + 1/2 NS @ 125 ml/hr     Diet: NPO ex. Meds (keeping strict NPO currently d/t aspiration risk)     Mobility: Ax1-2 w/ gait belt (uses urinal to void at bedside) -- moves self in bed     GI/: voids in urinal at bedside w/ adequate output, BM x 1 (medium, formed, brown) this afternoon at bedside commode     Wound/Skin: scattered bruising; abrasions to left scalp, elbow, and shoulder from fall PTA     Consults: Hospitalist, Neurosurgery, ENT, and Oncology all following     Discharge Plan: TBD     Shift Note: PRN Dilaudid 0.6 mg available Q1h -- given roughly every hour throughout the shift. On K, Mg, and Ph protocols -- replaced Mg this morning and currently replacing Ph -- all rechecks ordered for tomorrow AM. Unable to obtain multiple aspects of neuro assessments d/t inability to follow commands. Patient is often unable to lay or sit in bed, but kneels in bed, facing the head of bed, yelling out, \"Please!\" And \"Yes!\". Hgb this AM was 6.9 -- hospitalist ordered 1 unit PRBCs, which were administered by writer -- see Blood Administration flowsheet. Hgb recheck ordered for 18:34. Aggression Stoplight Tool: Red, for agitation and confusion -- sitter at bedside for safety.      See Flow sheets for assessment                             "

## 2023-07-30 NOTE — PROGRESS NOTES
Neurosurgery Progress     Dx:     BRAIN MRI:   1. Acute subdural hematoma along the right convexity with mild mass  effect, unchanged compared to head CT from 7/28/2023.  2. Multiple small areas of diffusion restriction involving the left  frontal lobe, left temporal lobe, right parietal lobe with an left  lateral midbrain corresponding susceptibility related signal loss in  the left frontal lobe and right parietal lobe raising concern for  traumatic diffuse axonal injury. Subtle infarcts with petechial  hemorrhage not excluded. The right parietal lesions could represent  cortical contusions.  3. 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     CERVICAL SPINE MRI:  1.  Posterior pharyngeal wall ulceration extending to the C1 vertebra  with marrow signal changes involving the C1 vertebra consistent with  osteomyelitis.  2. Epidural abscess extending from C1 through C7 thickest at C2  encasing the cervical cord. No convincing cord signal abnormalities.  3. Widespread fluid stranding enhancement throughout the deep spaces  of the neck, concerning for extensive multicompartment infection.  4. The soft tissue changes extensively involve the posterior  paraspinal musculature, and sequela of superimposed rhabdomyolysis  cannot be excluded which can be drug-induced.  5. Superimposed traumatic muscular/ligamentous strain cannot be  excluded.     ID note: Blood cxs are positive for gram negative cocci. 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     TODAY'S PLAN:     No change in our plan. Dr. Casarez reviewed all imaging. At this time he is not recommending surgical intervention, rather continuation of antibiotics. He is fine with an LP if needed. Sounds like no LP is needed. Will continue to follow closely. Seems to be in  "significant pain. ID is involved and we appreciate their help.       In the event that patient's symptoms worsen or change we would appreciate being contacted. We did discuss signs of a worsening problem that he should seek being evaluated.     BP (!) 153/102   Pulse 103   Temp 99.2  F (37.3  C) (Rectal)   Resp 16   Ht 1.676 m (5' 6\")   Wt 46.7 kg (103 lb)   SpO2 99%   BMI 16.62 kg/m       Pt in bed. Appears comfortable and in no apparent distress, moving all extremities. Was in more pain this AM.   CN II-XII intact, alert and appropriate with conversation and following commands.   Bilateral upper and lower extremities with appropriate strength. DTR's WNL. Sensation intact throughout. Acceptable ROM.   Calves soft and non-tender bilaterally.     All pertinent labs and updated imaging reviewed in EPIC.     Kumar Burr PA-C   Neurosurgery   [phone] (P)       "

## 2023-07-30 NOTE — PLAN OF CARE
Reason for Admission: R frontal SDH and L temporal bone fracture s/p fall   Cognitive/Mentation: MARIAMA assess orientation, pt not cooperative with neuro exam  Neuros/CMS: Moving all extremities, pupils equal and reactive. Generalized weakness, 4/5 strength. Slurred speech. Difficult to assess, pt not following most commands.   VS: VSS on RA, SBP <150. Pt did require supplemental oxygen after receiving one time dose of 1mg IV dilaudid, now on RA  Tele: NSR   GI: BS audible, + flatus, no BM. Incontinent at times.  : Voiding adequately in urinal at bedside. Continent.  Pulmonary: LS diminished, infrequent cough  Pain: Pt unable to describe pain, however appears to be severe. PRN 0.6mg IV dilaudid given frequently as available  Drains/Lines: R port accessed, infusing NS at 125 mL/hr  Skin: Intact with L proximal forehead abrasion, L elbow/shoulder abrasions and scabbing. Scattered bruising.   Activity: Standing at bedside to void in urinal, Ax1-2. Moves self in bed  Diet: NPO, oral meds not given  Therapies recs: Pending  Discharge: Pending  Aggression Stoplight Tool: Red, agitation and confusion, sitter at bedside for safety

## 2023-07-30 NOTE — PROVIDER NOTIFICATION
MD Notification    Notified Person: MD    Notified Person Name: Zuri Soliz     Notification Date/Time: 7/29/23 2130    Notification Interaction: paged    Purpose of Notification: Pt continuously agitated, has order for Zyprexa at bedtime, however he has been unable to tolerate PO meds, can this be changed to IM?    Orders Received: Order switched to IM    Comments:

## 2023-07-31 NOTE — PROGRESS NOTES
[facility]    Infectious Disease Progress Note    Date of Service : 07/31/2023     Assessment:  52 YM with  oropharyngeal cancer of the posterior pharynx diagnosed in 2022,  s/p chemoradiation therapy, . recently found to have a posterior pharyngeal ulcer felt to be related to radiation, who has been admitted after a fall,  has been found to have a non depressed skull fracture with subdural hematoma as well as positive blood cxs for Dialister sp related to extensive cervical spine C1 osteomyelitis , extensive C cpine C1-C7 epidural abscess and deep neck infection as a consequence of posterior pharyngeal ulcer erosion into the cervical C1 vertebral body.     -Extensive cervical spine epidural abscess C1-C7 / C1 osteomyelitis and deep neck infection due to posterior pharyngeal ulcer erosion into the C1 vertebral body  -Positive blood cxs for gram negative cocci identified as Dialister sp of oral source  -Fall with acute subdural hematoma / possible empyema  -MRI findings of subtle CNS infarcts / cortical contusion or traumatic diffuse anoxal injury  -Oropharyngeal cancer s/p chemoradiation therapy with 5FU + carboplatin 8/31/22-10/26/22 complicated by moderate mucositis and YAYA's  -Marked leukocytosis  -Ongoing odynophagia , posterior pharyngeal ulcer likely malignant with ongoing weight loss  -YAYA  -HTN, anemia     Recommendations:  Meropenem to Unasyn  G tube placement is planned  No surgical plans  Goals of care discussion    Recommendations were discussed with the hospitalist service  Charlette Cee MD    Interval History   Was moving all 4 extremities today, has remained afebrile, subsequent blood cxs are negative thus far, no surgical plans at this time. Planned for G tube placement. Irritable, writing in pain and remains confused, nursing and sitter at bedside    Physical Exam   Temp: 100  F (37.8  C) Temp src: Axillary BP: (!) 143/84 Pulse: 90   Resp: 18 SpO2: 100 % O2 Device: None (Room  air)    Vitals:    07/26/23 1301   Weight: 46.7 kg (103 lb)     Vital Signs with Ranges  Temp:  [98.2  F (36.8  C)-100.7  F (38.2  C)] 100  F (37.8  C)  Pulse:  [] 90  Resp:  [16-18] 18  BP: (129-178)/() 143/84  SpO2:  [96 %-100 %] 100 %    Constitutional: does not interact, irritable, moaning, confused , cachectic appearing  Lungs: non labored breathing  Abdomen: soft  Skin: No rash  MS : no edema    Other:    Medications    dextrose      dextrose 5% and 0.45% NaCl 125 mL/hr at 07/30/23 1930    [START ON 8/1/2023] sodium chloride 0.9%        [START ON 8/1/2023] ceFAZolin  2 g Intravenous Pre-Op/Pre-procedure x 1 dose    folic acid  1 mg Intravenous Daily    gabapentin  200 mg Oral or Feeding Tube BID    heparin  5-10 mL Intracatheter Q28 Days    heparin lock flush  5-10 mL Intracatheter Q24H    levETIRAcetam  500 mg Intravenous Q12H    magnesium sulfate  2 g Intravenous Once    meropenem  2 g Intravenous Q8H    sodium chloride (PF)  10-20 mL Intracatheter Q28 Days    sodium chloride (PF)  3 mL Intracatheter Q8H    sodium chloride (PF)  3 mL Intracatheter Q8H    sodium phosphate  15 mmol Intravenous Once       Data   All microbiology laboratory data reviewed.  Recent Labs   Lab Test 07/31/23  1044 07/30/23  1931 07/30/23  0741 07/29/23  0449   WBC 10.7  --  14.6* 18.2*   HGB 7.8* 8.2* 6.9* 7.8*   HCT 23.2*  --  20.8* 23.2*   MCV 93  --  97 95   *  --  419 321     Recent Labs   Lab Test 07/31/23  1044 07/30/23  0741 07/29/23  0450   CR 0.69 0.76 0.94     Recent Labs   Lab Test 07/28/23  2250   SED 96*

## 2023-07-31 NOTE — PROGRESS NOTES
ADDENDUM: CONSULT TO ORDER TF -     Chart reviewed this afternoon  Plan to place FT nehemias  Labs: Na 146            K 3.2 (L)            Mg 1.5 (L)            Phos 1.7 (L)    Pt on replacement protocols    Need to start TF slowly - pt refeeding risk with minimal po intake and low body wt    CLINICAL NUTRITION SERVICES - REASSESSMENT NOTE      Future/Additional Recommendations:     If plan to start EN, recommend:  Enteral Frequency:  Continuous  Enteral Regimen: Jevity 1.5 @ 50 mL/hr (goal rate)  Total Enteral Provisions: 1800 cals (38 cals/kg), 77 gm pro (1.6 gm/kg), 25 gm fiber, 912 mL free water  Free Water Flush: 60 mL every 4 hrs    Would recommend start at 10 mL/hr.  Pending labs (K, Mg, Phos), increase by 10 mL every 24 hrs to goal rate.       Malnutrition: (7/27)  % Weight Loss:  Up to 20% in 1 year (moderate malnutrition)  % Intake:  Unable to determine but strongly suspected   Subcutaneous Fat Loss:  Orbital region severe depletion, Upper arm region severe depletion, and Thoracic region severe depletion  Muscle Loss:  Temporal region severe depletion, Clavicle bone region severe depletion, Acromion bone region severe depletion, Dorsal hand region severe depletion, Patellar region severe depletion, Anterior thigh region severe depletion, and Posterior calf region severe depletion  Fluid Retention:  None noted     Malnutrition Diagnosis: Severe malnutrition  In Context of:  Acute illness or injury  Chronic illness or disease       EVALUATION OF PROGRESS TOWARD GOALS   Diet:    NPO (day 5)      Intake/Tolerance:    Chart reviewed  Note mention of PEG placement for nutrition    IVF (D5 containing) @ 125 mL/hr  (510 cals)      ASSESSED NUTRITION NEEDS:  Dosing Weight (7/26) 46.7 kg   Estimated Energy Needs: 1645-1659 kcals (35-40 Kcal/Kg)  Justification: repletion and underweight  Estimated Protein Needs:  grams protein (1.5-2 g pro/Kg)  Justification: repletion       NEW FINDINGS:   Pt agitated and needing  sitter  Pain control has been a challenge    7/30: K 3.6           Mg 1.4 (L)           Phos 2.0 (L)    Previous Goals (7/27):   Patient will advance diet in the next 24 hours (vs start TF)   Evaluation: Not met    Previous Nutrition Diagnosis (7/27):   Inadequate protein-energy intake related to NPO with potential swallowing issues as evidenced by meeting 0% needs    Evaluation: No change, modified below        CURRENT NUTRITION DIAGNOSIS  Inadequate protein-energy intake related to NPO status as evidenced by pt has been without nutrition since admit (5 days)    INTERVENTIONS  Recommendations / Nutrition Prescription  NPO (per MD)    If plan to start EN, recommend:  Enteral Frequency:  Continuous  Enteral Regimen: Jevity 1.5 @ 50 mL/hr (goal rate)  Total Enteral Provisions: 1800 cals (38 cals/kg), 77 gm pro (1.6 gm/kg), 25 gm fiber, 912 mL free water  Free Water Flush: 60 mL every 4 hrs    Would recommend start at 20 mL/hr.  Pending labs (K, Mg, Phos), increase by 15 mL every 24 hrs to goal rate.        Goals  Pt to receive nutrition in the next 48 hrs      MONITORING AND EVALUATION:  Progress towards goals will be monitored and evaluated per protocol and Practice Guidelines

## 2023-07-31 NOTE — CONSULTS
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 The patient was found to have a small subdural hemorrhage and sepsis. He has been confused since the fall in the hospital. Also has been losing weight  The patient is altered and unable to offer meaningful information.     A g tube for nutrition has been requested.     Unable to find documentation of when and where the g tube was placed and when/where it was removed. Per the brother Mookie he wasn't sure.     NPO: Will be made NPO for tomorrow 8/1/23  ANTICOAGULANTS: None  ANTIBIOTICS: None    Past Medical History:   Diagnosis Date    Alcohol abuse     Hypertension     Lumbar herniated disc     Marijuana dependence (H)      Medications Prior to Admission   Medication Sig Dispense Refill Last Dose    Acetaminophen (TYLENOL ARTHRITIS PAIN PO) Take 2 tablets by mouth daily as needed (pain)    at PRN    cyclobenzaprine (FLEXERIL) 10 MG tablet Take 10 mg by mouth 3 times daily as needed for muscle spasms   7/26/2023 at ~1200    gabapentin (NEURONTIN) 100 MG capsule Take 200 mg by mouth 2 times daily Med last filled   7/26/2023 at AM    ibuprofen (ADVIL/MOTRIN) 200 MG tablet Take 200 mg by mouth every 6 hours as needed for pain    at PRN    lisinopril-hydrochlorothiazide (ZESTORETIC) 20-12.5 MG tablet Take 1 tablet by mouth daily 90 tablet 0 7/26/2023 at AM    OLANZapine (ZYPREXA) 5 MG tablet Take 5 mg by mouth nightly as needed (sleep)   Past Month at PRN    ondansetron (ZOFRAN ODT) 8 MG ODT tab Take 8 mg by mouth every 8 hours as needed for nausea or  other (vomiting)       amLODIPine (NORVASC) 5 MG tablet TAKE 1 TABLET BY MOUTH EVERY DAY (Patient not taking: Reported on 7/26/2023) 90 tablet 0 Not Taking at pt reports this was stopped ~2 wks ago     ALLERGIES  No Known Allergies    LABS:  ROUTINE ICU LABS (Last four results)  CMP  Recent Labs   Lab 07/31/23  1044 07/30/23  1931 07/30/23  0741 07/29/23  2116 07/29/23  2017 07/29/23  0450 07/29/23  0349 07/28/23  2250 07/28/23  1637 07/28/23  1140 07/28/23  0331 07/27/23  0843 07/26/23  1313   *  --  146*  --   --  143  --   --   --   --  135*   < > 134*   POTASSIUM 3.2*  --  3.6  --   --  3.7  --  3.8  --   --  3.2*   < > 3.2*   CHLORIDE 107  --  110*  --   --  108*  --   --   --   --  102   < > 95*   CO2 27  --  20*  --   --  20*  --   --   --   --  21*   < > 25   ANIONGAP 12  --  16*  --   --  15  --   --   --   --  12   < > 14   *  --  91 70 76 87   < >  --    < >  --  88   < > 117*   BUN 10.1  --  14.8  --   --  20.8*  --   --   --   --  32.7*   < > 44.1*   CR 0.69  --  0.76  --   --  0.94  --   --   --   --  1.43*   < > 2.91*   GFRESTIMATED >90  --  >90  --   --  >90  --   --   --   --  59*   < > 25*   TOSHA 8.4*  --  8.7  --   --  8.8  --   --   --   --  8.1*   < > 8.2*   MAG 1.5* 1.8 1.4*  --   --  2.0  --  1.9  --  1.5*  --    < >  --    PHOS 1.7*  --  2.0*  --   --  2.6  --   --   --  2.7  --    < >  --    PROTTOTAL  --   --  5.1*  --   --   --   --   --   --   --   --   --  5.5*   ALBUMIN  --   --  2.3*  --   --   --   --   --   --   --   --   --  2.6*   BILITOTAL  --   --  0.7  --   --   --   --   --   --   --   --   --  0.4   ALKPHOS  --   --  135*  --   --   --   --   --   --   --   --   --  241*   AST  --   --  20  --   --   --   --   --   --   --   --   --  26   ALT  --   --  14  --   --   --   --   --   --   --   --   --  25    < > = values in this interval not displayed.     CBC  Recent Labs   Lab 07/31/23  1044 07/30/23  1931 07/30/23  0741 07/29/23  0449 07/28/23  0331   WBC 10.7  --   14.6* 18.2* 13.6*   RBC 2.50*  --  2.15* 2.45* 2.36*   HGB 7.8* 8.2* 6.9* 7.8* 7.6*   HCT 23.2*  --  20.8* 23.2* 22.5*   MCV 93  --  97 95 95   MCH 31.2  --  32.1 31.8 32.2   MCHC 33.6  --  33.2 33.6 33.8   RDW 15.6*  --  15.4* 15.0 14.7   *  --  419 321 266     INRNo lab results found in last 7 days.  Arterial Blood Gas  Recent Labs   Lab 07/29/23  0408   PH 7.35   PCO2 41   PO2 72*   HCO3 23   O2PER 21     EXAM:  Temp:  [98.2  F (36.8  C)-100.7  F (38.2  C)] 100  F (37.8  C)  Pulse:  [] 90  Resp:  [16-18] 18  BP: (129-178)/() 143/84  SpO2:  [96 %-100 %] 100 %    No physical exam done today.     Pre-Sedation Code Status Assessment:  Code Status: Full Code intra procedure, per chart review.     History and Physical Reviewed: H&P documented within 30 days.  I have personally reviewed the patient's medical history and have updated the medical record as necessary.    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 The patient was found to have a small subdural hemorrhage and sepsis. He has been confused since the fall in the hospital. Also has been losing weight  The patient is altered and unable to offer meaningful information.     A g tube for nutrition has been requested.     Unable to find documentation of when and where the g tube was placed and when/where it was removed. Per the brother Mookie he wasn't sure.     Reviewed with IR Dr Yousif Rothman who approved for g tube placement. The patient has a history of a pharyngeal ulcer and IR may not be able to place a temporary NG tube for the procedure for insufflation. If this is the case a g tube won't be able to be placed.     -NPO after midnight  -Orders entered  -Procedure time at 9am  8/1/23     Procedural education reviewed with the brother Mookie in detail including, but not limited to risks, benefits and alternatives with understanding verbalized by him.    Total time spent on the date of the encounter: 40 minutes.    Thanks [facility] Interventional Radiology CNP ([phone]) (phone [phone])

## 2023-07-31 NOTE — PROGRESS NOTES
[facility]    Medicine Progress Note - Hospitalist Service    Date of Admission:  7/26/2023    Assessment & Plan   Iain Fuentes is a 52 year old male who was brought to the emergency room after he [place] [employer]. Upon admission he was found to have a SDH and later developed signs of sepsis. Patient's recent medical history is unclear due to him being a poor historian. By chart review he followed with ENT 05/2023. His cancer was considered resolved. 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 It does not appear patient ever had that done and he his chart notes that presented to his PCP and ortho clinic for ongoing neck pain (no discussion of his ulcer noted) He next presented to [facility] via EMS with his fall. His PEG tube has been removed and he is malnourished with at least 10 pound weight loss in 2-3 months. Events of the last 2-3 months are unclear.     Unwitnessed fall, suspect syncope in the setting of overwhelming sepsis  Subdural hematoma with 1 to 2 mm leftward midline shift of the septum pellucidum   Nondisplaced skull fracture to the L sphenoid and R maxillary sinus  *CT head on admission showed thin subdural hemorrhage along the right frontal convexity measuring up to 1 cm maximum with 1 to 2 mm leftward midline shift.  Also was found to have nondisplaced fracture of the skull.  *Repeat CT head with questionable subtle increase in SDH size but otherwise no change. Subsequent CT with stable finding.     - Reviewed case with ENT on admission, Patient follows with Dr. Madison and is well known to their service. Appreciate their assistance in piecing together his recent medical history. Follow up Temporal and facial CT ordered at their request with results demonstrating R maxillary sinus fracture however no temporal bone fracture.  - BP goal <150 with HOB >30  degrees  - q4h neuro check  - Pain management with IV dilaudid.  - EEG without epileptiform discharges     DiasBacteremia and Sepsis  Epidural abscess, C1-C7, encasing the cervical cord  Pharyngeal wall ulceration extending to vertebra, ? infected tumour  C1 osteomyelitis  Suspect Endocarditis  Patient presented with fever and hypotension with normal WBC count and normal lactate but elevated procal. UA clear and CXR clear.  - blood cultures from admission grew dialister species. Was started on zosyn on admission and changed to meropenem and vancomycin   -ID consulted, vancomycin stopped. Meropenem alone.   -Subsequent blood cultures 7/28 negative so far  -TTE pending, likely needs GERSON based on finding, this will be challenging as he will likely need GA  -Discussed with neurosurgery, not recommending surgical intervention at this point, no need for thoracic MRI per neurosurgery. Although he will likely to be re imaged in short interval for c spines, plan thoracic as well at that time  -Discussed with ID, no plan for LP at this point.   -ongoing pain, needing IV dilaudid every hour, pain consult      Possible multifocal stroke, suspected due to endocarditis  Acute encephalopathy, multifactorial, due to sepsis, SDH, stroke  MRI brain showed Multiple small areas of diffusion restriction involving the left frontal lobe, left temporal lobe, right parietal lobe with an left lateral midbrain corresponding susceptibility related signal loss in the left frontal lobe and right parietal lobe raising concern for traumatic diffuse axonal injury. Subtle infarcts with petechial hemorrhage not excluded. The right parietal lesions could represent cortical contusions.  - Stroke neurology consuled, appreciate assistance  - continue with telemetry, , HbA1C 5.6  - serial neuro checks, BP goals per neurology  - EEG negative for electrogaphic seizure   - given SDH no asa/AC  - Abx as above    Squamous cell carcinoma of posterior  pharynx  Severe malnutrition  Dysphagia  Definitve diagnosis by biopsy 07/15/2022. Underwent chemo and radiation with [facility] and completed treatment late last year with complete resolution of cancer-plans for surveillance. Most recent ENT exam 05/2023 negative       - Oncology visited with patient yesterday and also unclear why the PEG tube has been removed. Patient is well known to Dr. Dreyer but did not recognize him  -Given extent of pharyngeal ulcer, will not attempt any p.o. or SLP.  -Continue IVF, avoid TPN given bacteremia/plus doubt he will be about to transition to PO eventually so he will need PEG. Brother ok with PEG. OK with ID given anaerobes in blood culture, IR consulted for PEG placement.  - nutrition consult for TF recs      Acute kidney injury  Hypernatremia  - Suspect this is prerenal, recent creatinine was normal, presented with a creatinine of 2.91  -Creatinine has improved to 0.7  -Continue IV hydration, IVF changed to hypotonic saline given hypernatremia  - AM labs are still pending till now     Acute anemia on chronic  Folate deficiency  -Hemoglobin at presentatio 8.0, last hemoglobin was 10.7 per scan notes from [place] [facility]  -Unable to get further history regarding bleeding  - oncology ordered further anemia labs and folate has returned low with low iron and high ferritin  - IV folate ordered  - Hb as low as 6.9 and received 1 unit PRBC 7/30. No signs of bleeding, likely due to acute illness and dilutional. Appropriate rise in Hb after transfusion  - Transfuse to keep Hb>7     Hypokalemia  Hypomagnesia  Replace per protocol    Essential hypertension  -Hold prior to admission antihypertensives given concern for sepsis and soft blood pressure.  -As needed IV hydralazine has been ordered for BP goal       Diet: NPO for Medical/Clinical Reasons Except for: Meds    DVT Prophylaxis: PCDs  King Catheter: Not present  Lines: PRESENT      Port A Cath Single 08/23/22 Right Chest wall-Site  "Assessment: WDL      Cardiac Monitoring: ACTIVE order. Indication: sepsis  Code Status: Full Code      Clinically Significant Risk Factors                   # Cachexia: Estimated body mass index is 16.62 kg/m  as calculated from the following:    Height as of this encounter: 1.676 m (5' 6\").    Weight as of this encounter: 46.7 kg (103 lb).     # Severe Malnutrition: based on nutrition assessment         Disposition Plan          Guarded prognosis overall specially poor prognosis long term, will follow up with patient's brother and recommend palliative consult.     Alexus Lawton MD  Hospitalist Service  [facility]  Securely message with [employer] (more info)  Text page via [facility] Paging/Directory   ______________________________________________________________________    Interval History     Evaluated patient this morning, patient remains alert awake, restless likely due to pain and discomfort.  States \"fine\", otherwise keeps saying \"no, no, no\" to most of the questions.   -Noted patient had low-grade fever of 100.7 this AM.  Remains mildly tachycardic which since admission has improved.    Physical Exam   Vital Signs: Temp: 99.2  F (37.3  C) Temp src: Axillary BP: 129/83 Pulse: 101   Resp: 18 SpO2: 98 % O2 Device: None (Room air)    Weight: 103 lbs 0 oz    General: Alert awake, restless, not in distress  HEENT: PERRLA, mucosa is dry. Laceration to Lt scalp with scab, no bleeding.  Swollen neck posteriorly with erythema of the skin. Swelling slightly better.   Lungs: Bilateral equal air entry. Coarse breath sounds, normal work of breathing.   Chest: Port in place, IV infusing.  CVS: S1S2 regular, mild tachycardia, no murmur.   Abdomen: Soft, NT, ND. BS heard.  MSK: No edema or deformities.  Neuro: alert awake, weak hand  bilaterally, face symmetrical, moving all extremities   Skin: No rash.       Medical Decision Making       50 MINUTES SPENT BY ME on the date of service doing chart " review, history, exam, documentation & further activities per the note.   Discussed with patient's brother, RN, ID    Data     I have personally reviewed the following data over the past 24 hrs:    10.7  \   7.8 (L)   / 454 (H)     N/A N/A N/A /  N/A   N/A N/A N/A \     TSH: N/A T4: N/A A1C: N/A     Imaging results reviewed over the past 24 hrs:   No results found for this or any previous visit (from the past 24 hour(s)).

## 2023-07-31 NOTE — PROGRESS NOTES
"Page to hospitalist:    \"528 WP - Pt's pain not controlled with q1h IV dilaudid, pt combative/agitated, continuously attempting to get out of bed. IM Zyprexa given with no effect. Is there anything else we can give?    Thanks, Kailyn RN [id number]\"    Addendum: House NP called to assess pt, one time dose 0.5 mg IV ativan ordered and given, effective      "

## 2023-07-31 NOTE — PROGRESS NOTES
Brief House NP Note    Messaged on [employer] by RN regarding patient's ongoing pain. RN stated that patient had been yelling in pain both this evening and last night and the PRN dilaudid was insufficient in adequately controlling pain. I discussed the dangers of oversedation given the likelihood of a complicated airway should intubation be needed. Patient is also not a candidate for NG placement given pharynx ulceration and maxillary sinus fracture.    Plan:  - Continue diligent PRN dilaudid administration; can add one-time dose to get on top of pain control  - Add 0.5 mg IV ativan once  - Advise pain management consult tomorrow    PARIS Watts, CNP  Hospitalist - [facility]  Text me on the [employer] for a textback  Text page with web based paging for a callback

## 2023-07-31 NOTE — PROGRESS NOTES
Patient Status: Pt switched to DNR/DNI today after episode of tachypnea/tachycardia. Giving IV Dilaudid, Ativan and Tylenol per MAR. PRNs available. Haldol given without relief.  Respiratory: LS diminished in bases. O2 sats in 90s on RA. Had an episode of coughing/choking, NC at bedside as needed for comfort.  Pulse: tachycardic  Medications Given: See MAR.   Visitors: none   Cares given: Frequent boosting, HOB 30-45 degrees, suctioning & swabs PRN    End of Shift Note: Pt made DNR/DNI and comfort cares today with progression of sepsis and episode of coughing/choking. Giving frequent IV Dilaudid for pain. Sitter at bedside for safety.

## 2023-07-31 NOTE — PROGRESS NOTES
F/u note    Iain Fuentes MRN# [id number]   YOB: 1970 Age: 52 year old   Date of Admission: 7/26/2023  Requesting physician: Dr. Meza  Reason for consult: Head and neck cancer            Assessment and Plan:   1. Posterior non-healing pharyngeal ulcer  - Most likely secondary to radiation   - Follows with ENT, last exam with ENT 5/2023 without signs of malignancy  - Recommended to get hyperbaric oxygen chamber outpatient -> patient unable to get this done     2. 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  There was necrosis in the middle of the mass and minor oozing.  It extends into the inferior portion of the nasopharynx and extends down towards the level of the tongue base.  - 7/15/22 biopsy revealed invasive squamous cell carcinoma, p16 negative.  - Saw Dr. Madison outpatient, surgery would require bilateral neck dissection and resection of pharyngeal tumor with reconstruction, felt chemoradiation would be better route for definitive treatment   - Port placed  - G tube placed.   - Cycle 1 given 8/29-9/1, cycle 2 carboplatin/5FU given 9/19-9/23, cycle 3 10/24-10/27. Radiation from 8/31-10/26 (multiple treatment delays).   - F/U PET-CT 4/6/23 with increase in metabolic activity in posterior pharyngael mass and small superior left cervical lymph node  - 5/2023 ENT exam negative except for necrotic posterior pharynx ulcer     Concern about recurrent disease based on the ongoing nonhealing esophageal ulcer, continuing evaluation by ENT.    3.  Epidural abnormality, concern for abscess  - Management as per  primary team being followed by neurosurgery,  infection disease      3. Weight loss  - At some point, his G tube was removed and he has been losing weight   - 114 pounds April 2023   - Secondary to odynophagia from painful ulcer   G tube feeding is recommended.    4. Altered mental status  - Likely secondary to subdural, epidural abscess    5.  Anemia  -Multifactorial, malnutrition plays a role in addition to prior chemoradiotherapy.,  Required 1 unit transfusion on July 30.                 Chief Complaint:   Fall           History of Present Illness:   This patient is a 52 year old male with history of head and neck cancer s/p chemoradiation ending 10/2022 and posterior pharyngeal ulcer as a complication of radiation who presented to the hospital on 7/26/23 after he fell at a [employer]. The patient was found to have a small subduram hemorrhage. He was admitted for further work up. The patient is altered and unable to offer meaningful information. He is not sure when his PEG tube was removed. He is losing weight. He denies fevers.     Patient is a 51-year-old male with history of hypertension who presented to us on 8/5/2022 courtesy of his ENT physician for the further evaluation of head and neck cancer.  Patient reports he has been having progressive painful swallowing.  He has been losing weight, about 20 pounds in the past 2 months.  He ultimately went to urgent care for this problem they started him on amoxicillin and sent him to the hospital.  In the hospital he was placed on azithromycin.  He was found to be in acute renal failure and was given IV fluids.  laryngoscopy was performed which revealed a mass of the posterior pharynx a biopsy revealed a squamous cell carcinoma that is p16 negative.    8/8/2022: PET scan Revealed large area of hypermetabolic soft tissue thickening involving the posterior oropharynx and extending superior to the soft palate and inferior, approximately to the level of the epiglottis.   "This involves both tonsillar regions and is slightly more prominent on the left.  The area of high metabolic activity measures approximately 5.8 cm in width by 3.8 cm in AP dimension by 7.7 cm in craniocaudal dimension.  With an SUV max of 33.6 on the head and neck image and an SUV max of 22.6 on the whole-body images there is bilateral hypermetabolic cervical lymphadenopathy involving levels 2A/BN3.  Lymph nodes in these areas measure approximately 1.1 x 0.8 cm and demonstrate an SUV max of 4.4 on the head and neck images and SUV max of 3.6 on the whole-body images there is no convincing evidence of other areas of tumor involvement or metastatic disease    ChemoXRT with 5FU + carboplatin 8/31/22-10/26/22 complicated by moderate mucositis and YAYA's responding to IVF and supportive care     Interval history  Patient was seen, sitter at bedside.  He continues to be agitated and requires IV Dilaudid for pain control.  Last dose was received less than an hour ago and he has sedated but he gets agitated with any interaction.  He had worsening anemia yesterday and received a unit of red cells, today the hemoglobin 8.2.         Physical Exam:   Vitals were reviewed  Blood pressure (!) 164/98, pulse 90, temperature (!) 100.7  F (38.2  C), temperature source Axillary, resp. rate 16, height 1.676 m (5' 6\"), weight 46.7 kg (103 lb), SpO2 96 %.    Evidence of malnutrition.  HEENT: Normocephalic atraumatic sclera anicteric.  Neck: No lymphadenopathy.  Lungs: No respiratory distress, no wheezing.  Abdomen: Soft no masses.  Extremities: No cyanosis or edema.                Past Medical History:   I have reviewed this patient's past medical history  Past Medical History:   Diagnosis Date    Alcohol abuse     Hypertension     Lumbar herniated disc     Marijuana dependence (H)              Past Surgical History:   I have reviewed this patient's past surgical history  Past Surgical History:   Procedure Laterality Date    ARTHROSCOPY " KNEE WITH MENISCUS ALLOGRAFT Left     EXCISE LESION INTRAORAL N/A 7/15/2022    Procedure: 1.  Direct laryngoscopy. 2.  Evaluation under anesthesia with biopsies;  Surgeon: Austin Kate MD;  Location: [facility] OR    IR CHEST PORT PLACEMENT > 5 YRS OF AGE  2022    [facility] NONSPECIFIC PROCEDURE      facial surgery/repair after trauma               Social History:   I have reviewed this patient's social history  Social History     Tobacco Use    Smoking status: Every Day     Packs/day: 1.00     Years: 20.00     Pack years: 20.00     Types: Cigarettes     Start date: 12/15/1985    Smokeless tobacco: Never   Substance Use Topics    Alcohol use: Yes             Family History:   I have reviewed this patient's family history  Family History   Problem Relation Age of Onset    Arthritis Father     Blood Disease Father         amyloidosis    Depression Mother         committed suicide     Hypertension Brother     Cancer Paternal Grandmother          of lung cancer    Gastrointestinal Disease Paternal Grandfather         pancreatic disorder             Allergies:   No Known Allergies          Medications:   I have reviewed this patient's current medications  Medications Prior to Admission   Medication Sig Dispense Refill Last Dose    Acetaminophen (TYLENOL ARTHRITIS PAIN PO) Take 2 tablets by mouth daily as needed (pain)    at PRN    cyclobenzaprine (FLEXERIL) 10 MG tablet Take 10 mg by mouth 3 times daily as needed for muscle spasms   2023 at ~1200    gabapentin (NEURONTIN) 100 MG capsule Take 200 mg by mouth 2 times daily Med last filled   2023 at AM    ibuprofen (ADVIL/MOTRIN) 200 MG tablet Take 200 mg by mouth every 6 hours as needed for pain    at PRN    lisinopril-hydrochlorothiazide (ZESTORETIC) 20-12.5 MG tablet Take 1 tablet by mouth daily 90 tablet 0 2023 at AM    OLANZapine (ZYPREXA) 5 MG tablet Take 5 mg by mouth nightly as needed (sleep)   Past Month at PRN    ondansetron (ZOFRAN ODT) 8  MG ODT tab Take 8 mg by mouth every 8 hours as needed for nausea or other (vomiting)       amLODIPine (NORVASC) 5 MG tablet TAKE 1 TABLET BY MOUTH EVERY DAY (Patient not taking: Reported on 7/26/2023) 90 tablet 0 Not Taking at pt reports this was stopped ~2 wks ago             Review of Systems:                 Data:   Data   Results for orders placed or performed during the hospital encounter of 07/26/23 (from the past 24 hour(s))   Magnesium   Result Value Ref Range    Magnesium 1.8 1.7 - 2.3 mg/dL   Hemoglobin   Result Value Ref Range    Hemoglobin 8.2 (L) 13.3 - 17.7 g/dL

## 2023-07-31 NOTE — PLAN OF CARE
Goal Outcome Evaluation:           Overall Patient Progress: no changeOverall Patient Progress: no change    Outcome Evaluation: Diet: NPO (day5).  Note discussion of PEG. If plan to start EN, recommend:  Jevity 1.5 @ 50 mL/hr (goal rate).

## 2023-07-31 NOTE — PLAN OF CARE
Reason for Admission: R frontal SDH and L temporal bone fracture s/p fall   Cognitive/Mentation: Pt oriented to self. Very agitated/restless most of shift, improved with IM zyprexa and one time dose of IV ativan, pt able to sleep intermittently between cares overnight after receiving the ativan.   Neuros/CMS: Moving all extremities, pupils equal and reactive. Generalized weakness, 4/5 strength. Slurred speech. Difficult to assess, pt not following most commands.   VS: VSS on RA, SBP <150. Stable on RA this shift  Tele: NSR   GI: BS audible, + flatus, no BM.   : Voiding adequately in urinal at bedside. Mostly continent.  Pulmonary: LS diminished, infrequent cough  Pain: Pt reporting head/neck pain, PRN 0.6mg IV dilaudid given nearly every hour overnight.  Drains/Lines: R port accessed, infusing D5 1/2 NS at 125 mL/hr  Skin: Intact with L proximal forehead abrasion, L elbow/shoulder abrasions and scabbing. Scattered bruising.   Activity: Standing at bedside to void in urinal, able to take a few steps. Ax1-2. Moves around in bed  Diet: Strict NPO, oral meds not given d/t dysphagia  Therapies recs: Pending  Discharge: Pending  Aggression Stoplight Tool: Red, agitation and confusion, sitter at bedside for safety  Labs: Ph, Mg, K rechecks this AM. Hgb rechecked last evening 8.2.    Plan: PEG tube placement

## 2023-07-31 NOTE — PLAN OF CARE
"SLP: SLP initially consulted on 7/27/23, but pt has not been medically appropriate for PO or a swallow evaluation x4 days. Discussed case with hospitalist today who again confirms this -- plan to strictly remain NPO, pursue PEG TF. SLP to sign off/ \"complete\" orders. Please re-consult if/when swallow evaluation is indicated.     "

## 2023-07-31 NOTE — CONSULTS
[facility] ACUTE PAIN SERVICE    ([facility], [facility], [facility])  Daily PAIN Progress Note    Assessment/Plan:  Iain Fuentes is a 52 year old male who was admitted on 7/26/2023.  I was asked to see the patient for chronic pain. Admitted for unwitnessed fall with SDH and then sepsis, has been hospitalized for 5 days. 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    shows that he was previously on fentanyl patches although was tapered off last December.  Unable to describe pain due to encephalopathy.  Yesterday he used 10 mg of IV Dilaudid in total.    PLAN:   Chronic pain, previously was maintained on fentanyl - this was tapered off.  Now with acute pain secondary to skull fracture, pharyngeal wall ulceration that extends to the vertebrae, C1 osteomyelitis, epidural abscess C1-C7.  Has pharyngeal ulcer and NPO currently. Agitation contributing to pain assessment.  Suggest Palliative Care consult (sepsis, endocarditis, malnutrition, and multiple strokes)- not sure if a PEG is the right next step or hospice- suggest Neurology & Oncology to weigh in on that.  Could consider adding sublingual oxycodone although would not continue on with IV Dilaudid if possible.  Unsure if alcohol withdrawal is contributing?  Multimodal Medication Therapy:   Adjuvants: Patient unable to take orals so options are very limited I also cannot tell exactly where his pain is so I do not know where we would put a topical medication, would suggest increasing Haldol to twice daily  Opioids: If patient can take sublingual suggest Oxycodone 5mg sublingual oxy every 3 hours as needed  Given current level of lethargy would suggest reducing the dose of IV Dilaudid  Non-medication interventions- Ice   Constipation Prophylaxis-no bowel regimen has been ordered  Follow up /Discharge Recommendations - We recommend prescribing the following at the time of discharge: TBD,  consider palliative care           Subjective:    Today I met with the patient via video visit.  He is seen laying flat in bed and is snoring.  He does not respond to any of my attempts to ask him about pain.    Discussed with nurse who notes that the patient has been agitated on and off he sometimes grabs at his neck.  She notes pharyngeal ulceration and n.p.o. status.  Has been taking IV Dilaudid.  Did have IM Haldol today.    Discussed with Dr. Alexus Lawton at 1435. Dr. Spaulding talk with brother Mookie this weekend.      Tele-Visit Details    Type of service:  Video Visit    Time Service Began (time 1st connected with pt): 1330    Time Service Ended (time completely finished with pt): 1451    Video Start Time (time video started): 1400    Video End Time (time video stopped): 1429    Originating Location (pt. Location): Patient Hospital Room     Distant Location (provider location): [facility]     Reason for Televisit: Pain Consult     Mode of Communication:  Video Conference via [email or web address]    Physician has received verbal consent for a video visit from the patient? Yes      PARIS Londono CNP        Anemia   Patient Active Problem List   Diagnosis    Essential hypertension, benign    CARDIOVASCULAR SCREENING; LDL GOAL LESS THAN 130    Tobacco abuse    Uncomplicated alcohol dependence (H)    Oropharyngeal mass    Pharyngitis, unspecified etiology    Acute kidney injury (H)    Subdural hemorrhage (H)    Skull fracture (H)    Anemia    Sepsis (H)    Gram negative sepsis (H)    Anaerobic sepsis (H)    Abscess in epidural space of cervical spine    Acquired immunocompromised state (H)        History   Drug Use    Types: Marijuana         Tobacco Use      Smoking status: Every Day        Packs/day: 1.00        Years: 20.00        Pack years: 20        Types: Cigarettes        Start date: 12/15/1985      Smokeless tobacco: Never         folic acid  1 mg Intravenous Daily    gabapentin  200 mg Oral BID     "heparin  5-10 mL Intracatheter Q28 Days    heparin lock flush  5-10 mL Intracatheter Q24H    levETIRAcetam  500 mg Intravenous Q12H    magnesium sulfate  2 g Intravenous Once    meropenem  2 g Intravenous Q8H    potassium chloride  10 mEq Intravenous Q1H    sodium chloride (PF)  10-20 mL Intracatheter Q28 Days    sodium chloride (PF)  3 mL Intracatheter Q8H    sodium chloride (PF)  3 mL Intracatheter Q8H    sodium phosphate  15 mmol Intravenous Once       Objective:  Vital signs in last 24 hours:  B/P: 143/84, T: 99.2, P: 90, R: 18   Blood pressure (!) 143/84, pulse 90, temperature 99.2  F (37.3  C), temperature source Axillary, resp. rate 18, height 1.676 m (5' 6\"), weight 46.7 kg (103 lb), SpO2 100 %.      Weight:   Wt Readings from Last 2 Encounters:   07/26/23 46.7 kg (103 lb)   08/23/22 49.9 kg (110 lb)           Intake/Output:    Intake/Output Summary (Last 24 hours) at 7/31/2023 1414  Last data filed at 7/31/2023 0811  Gross per 24 hour   Intake 250 ml   Output 1025 ml   Net -775 ml        Review of Systems:   As per subjective, all others negative.    Physical Exam:     Met with patient via video visit he is extremely lethargic    Imaging:  Personally Reviewed.    Results for orders placed or performed during the hospital encounter of 07/26/23   Head CT w/o contrast   Result Value Ref Range    Radiologist flags Intracranial hemorrhage and skull fracture (AA)     Impression    IMPRESSION:   1. Thin subdural hemorrhage along the right frontal convexity  measuring up to 1 cm maximum thickness resulting in 1-2 mm leftward  midline shift of the septum pellucidum.  2. Nondisplaced fracture through the greater wing of the sphenoid on  the left extending up into the squamosal portion of the left temporal  bone.  3. Questionable fracture through the mastoid portion of the temporal  bone on the right. Dedicated thin section skull base CT recommended  for further evaluation.  4. Diffuse cerebral volume loss and " cerebral white matter changes  consistent with chronic small vessel ischemic disease.     [Critical Result: Intracranial hemorrhage and skull fracture]    Finding was identified on 7/26/2023 3:45 PM.     LOLIS BALLBISMARKJUAREZ was contacted by Dr. Selby on 7/26/2023 3:58 PM  and verbalized understanding of the critical result.         Radiation dose for this scan was reduced using automated exposure  control, adjustment of the mA and/or kV according to patient size, or  iterative reconstruction technique.    BOBBI SELBY MD         SYSTEM ID:  [id number]   Cervical spine CT w/o contrast    Impression    IMPRESSION: Mild degenerative anterolisthesis of C2 upon C3 and C3  upon C4. Mild degenerative retrolisthesis of C4 upon C5. Alignment of  the cervical vertebrae is otherwise normal; however, there is reversal  of normal cervical lordosis centered at the T4 level. Vertebral body  heights of the cervical spine are normal. Craniocervical alignment is  normal. There are no fractures of the cervical spine. Loss of disc  space height and degenerative endplate spurring throughout the  cervical spine. Mild to moderate facet arthropathy throughout the  cervical spine. No high-grade spinal canal stenosis.      Radiation dose for this scan was reduced using automated exposure  control, adjustment of the mA and/or kV according to patient size, or  iterative reconstruction technique    BOBBI SELBY MD         SYSTEM ID:  [id number]   Chest XR,  PA & LAT    Impression    IMPRESSION: Normal size of cardiac silhouette. Right chest port with  tip overlying the cavoatrial junction. No definite airspace  consolidation, pleural effusion or pneumothorax. Tubular opacity  overlying the right axilla/chest wall, likely external to the patient.    KATHARINE WALKER MD         SYSTEM ID:  [id number]   CT Head w/o Contrast    Impression    IMPRESSION:  1.  Question subtle increase in thickness of the right convexity subdural hematoma anteriorly.  Remainder of the subdural hematoma is stable in thickness with mild increase in density.    2.  No significant mass effect or new hemorrhage.    3.  Mild increase in paranasal sinus fluid levels.   CT Head w/o Contrast    Impression    IMPRESSION: No substantial change in right subdural hyperdense  hematoma. No mass effect. No CT findings of acute hydrocephalus.    KALEN FOLEY DO         SYSTEM ID:  [id number]   CT Temporal Bones wo Contrast    Impression    IMPRESSION:  1.  No temporal bone fracture.   CT Facial Bones without Contrast    Impression    IMPRESSION:   1.  Fracture anterior wall right maxillary sinus.     CT Head w/o Contrast    Impression    IMPRESSION:  1.  Question subtle increased volume of subdural hemorrhage along the anterior right cerebral hemisphere without a significant change in thickness or associated mass effect.   CT Soft Tissue Neck w Contrast    Impression    IMPRESSION:  1. Ulceration involving the left posterior pharyngeal wall extending  to the C1 ring, questionably deeper compared to the prior neck CT from  5/31/2023.  2. Focal erosion involving the right anterior C1 ring raising concern  for osteomyelitis, new compared to 5/31/2023.  3. Marked worsening of associated soft tissue swelling and  inflammatory change involving the pharynx, prevertebral space,  bilateral carotid space, and bilateral paraspinal musculature,  concerning for multifocal deep space infection.  4. Increasing epidural fluid attenuation raising concern for epidural  phlegmon/abscess.  5. Possible fluid collection along the left C1 transverse  process/vertebral artery V3 segment.  6. Neck muscle swelling and inflammatory change can also be seen in  the setting of rhabdomyolysis.  7. Recommend cervical spine MRI with and without contrast.    Findings were discussed by phone between Dr. Corona and Dr. Meza  at approximately 1:05 PM on 7/28/2023.    PINKY CORONA MD         SYSTEM ID:  [id number]   CT Head w/o  Contrast    Impression    IMPRESSION: Stable appearance of hyperattenuating right frontal parietal and temporal subdural hematoma. No finding for interval hemorrhage.     MR Brain w/o & w Contrast    Impression    IMPRESSION:    BRAIN MRI:   1. Acute subdural hematoma along the right convexity with mild mass  effect, unchanged compared to head CT from 7/28/2023.  2. Multiple small areas of diffusion restriction involving the left  frontal lobe, left temporal lobe, right parietal lobe with an left  lateral midbrain corresponding susceptibility related signal loss in  the left frontal lobe and right parietal lobe raising concern for  traumatic diffuse axonal injury. Subtle infarcts with petechial  hemorrhage not excluded. The right parietal lesions could represent  cortical contusions.  3. 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    CERVICAL SPINE MRI:  1.  Posterior pharyngeal wall ulceration extending to the C1 vertebra  with marrow signal changes involving the C1 vertebra consistent with  osteomyelitis.  2. Epidural abscess extending from C1 through C7 thickest at C2  encasing the cervical cord. No convincing cord signal abnormalities.  3. Widespread fluid stranding enhancement throughout the deep spaces  of the neck, concerning for extensive multicompartment infection.  4. The soft tissue changes extensively involve the posterior  paraspinal musculature, and sequela of superimposed rhabdomyolysis  cannot be excluded which can be drug-induced.  5. Superimposed traumatic muscular/ligamentous strain cannot be  excluded.    PINKY ENGLAND MD         SYSTEM ID:  [id number]   XR Chest 2 Views    Impression    IMPRESSION: Needle is accessing the port, otherwise no metallic  foreign bodies demonstrated. Lungs are clear. There are no acute  infiltrates. The cardiac silhouette is not enlarged. Pulmonary  vasculature is unremarkable.     LEWIS IQBAL MD          SYSTEM ID:  [id number]   XR Abdomen 1 View    Impression    IMPRESSION: No radiopaque foreign body demonstrated, portions of the  pelvis are obscured by contrast in the bladder.    LEWIS IQBAL MD         SYSTEM ID:  [id number]   MR Cervical Spine w/o & w Contrast    Impression    IMPRESSION:    BRAIN MRI:   1. Acute subdural hematoma along the right convexity with mild mass  effect, unchanged compared to head CT from 7/28/2023.  2. Multiple small areas of diffusion restriction involving the left  frontal lobe, left temporal lobe, right parietal lobe with an left  lateral midbrain corresponding susceptibility related signal loss in  the left frontal lobe and right parietal lobe raising concern for  traumatic diffuse axonal injury. Subtle infarcts with petechial  hemorrhage not excluded. The right parietal lesions could represent  cortical contusions.  3. 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    CERVICAL SPINE MRI:  1.  Posterior pharyngeal wall ulceration extending to the C1 vertebra  with marrow signal changes involving the C1 vertebra consistent with  osteomyelitis.  2. Epidural abscess extending from C1 through C7 thickest at C2  encasing the cervical cord. No convincing cord signal abnormalities.  3. Widespread fluid stranding enhancement throughout the deep spaces  of the neck, concerning for extensive multicompartment infection.  4. The soft tissue changes extensively involve the posterior  paraspinal musculature, and sequela of superimposed rhabdomyolysis  cannot be excluded which can be drug-induced.  5. Superimposed traumatic muscular/ligamentous strain cannot be  excluded.    PINKY ENGLAND MD         SYSTEM ID:  [id number]   CT Head w/o Contrast    Impression    IMPRESSION:  1. Stable to subtle decrease in right convexity subdural hemorrhage.          Lab Results:  Personally Reviewed.   Last Comprehensive Metabolic Panel:  Sodium    Date Value Ref Range Status   07/31/2023 146 (H) 136 - 145 mmol/L Final   02/25/2021 136 133 - 144 mmol/L Final     Potassium   Date Value Ref Range Status   07/31/2023 3.2 (L) 3.4 - 5.3 mmol/L Final   07/15/2022 3.7 3.4 - 5.3 mmol/L Final   02/25/2021 4.0 3.4 - 5.3 mmol/L Final     Chloride   Date Value Ref Range Status   07/31/2023 107 98 - 107 mmol/L Final   07/15/2022 107 94 - 109 mmol/L Final   02/25/2021 102 94 - 109 mmol/L Final     Carbon Dioxide   Date Value Ref Range Status   02/25/2021 27 20 - 32 mmol/L Final     Carbon Dioxide (CO2)   Date Value Ref Range Status   07/31/2023 27 22 - 29 mmol/L Final   07/15/2022 27 20 - 32 mmol/L Final     Anion Gap   Date Value Ref Range Status   07/31/2023 12 7 - 15 mmol/L Final   07/15/2022 4 3 - 14 mmol/L Final   02/25/2021 7 3 - 14 mmol/L Final     Glucose   Date Value Ref Range Status   07/31/2023 115 (H) 70 - 99 mg/dL Final   07/15/2022 112 (H) 70 - 99 mg/dL Final   02/25/2021 104 (H) 70 - 99 mg/dL Final     GLUCOSE BY METER POCT   Date Value Ref Range Status   07/29/2023 70 70 - 99 mg/dL Final     Urea Nitrogen   Date Value Ref Range Status   07/31/2023 10.1 6.0 - 20.0 mg/dL Final   07/15/2022 14 7 - 30 mg/dL Final   02/25/2021 27 7 - 30 mg/dL Final     Creatinine   Date Value Ref Range Status   07/31/2023 0.69 0.67 - 1.17 mg/dL Final   02/25/2021 0.99 0.66 - 1.25 mg/dL Final     GFR Estimate   Date Value Ref Range Status   07/31/2023 >90 >60 mL/min/1.73m2 Final   02/25/2021 88 >60 mL/min/[1.73_m2] Final     Comment:     Non  GFR Calc  Starting 12/18/2018, serum creatinine based estimated GFR (eGFR) will be   calculated using the Chronic Kidney Disease Epidemiology Collaboration   (CKD-EPI) equation.       Calcium   Date Value Ref Range Status   07/31/2023 8.4 (L) 8.6 - 10.0 mg/dL Final   02/25/2021 9.7 8.5 - 10.1 mg/dL Final        UA: No results found for: UAMP, UBARB, BENZODIAZEUR, UCANN, UCOC, OPIT, UPCP           --------------------------  BILLING ON TIME ------------------------------------------------------------------------------------------------------------  71 MINUTES SPENT BY ME on the date of service doing chart review, history, exam, documentation & further activities per the note.    Daniela Hoyt APRN, CNS-BC, CNP, ACHPN  Acute Care Pain Management Program   Hours of pain coverage 7a-1700- after 1700 please call the house officer   [facility] (Leif FALK, TOMMYs, [place], RH)   Page via [employer]- Click HERE to page Daniela or Damari text web console

## 2023-07-31 NOTE — PROGRESS NOTES
Hospitalist brief update note:    Patient with ongoing either restlessness and agitation due to pain, or sedation. Became febrile. Had chocking spell per RN. He is not able to follow directions. Very encephalopathic.   -discussed with his Brother at length this afternoon. Discussed with infectious disease consultant, and felt that given the pharyngeal ulcer likely malignancy eroding into his c spine, and development of this epidural abscess with no plan for surgery, likely incurable with abx alone. Also he is having chocking spells and is high risk for aspiration and impending airway obstruction.Noted his chemo was also complicated by YAYA/mucositis.  Given his critical illness, unclear if he will ever be able to get treatment for underlying cancer. Also discussed at length regarding code status.     After reviewing above with his Brother Mookie (per him, patient is unmarried, no children, and Mookie is only sibling), Mookie understands his prognosis and prefers patient to be be DNR/DNI and patient's care to be transitioned to comfort measures only.     Orders entered and communicated with RN.   Extended care time: 40 min.    Alexus Lawton MD  Hospitalist

## 2023-07-31 NOTE — PROGRESS NOTES
Patient seen and examined.  Does not participate in neuro exam, but sitting up in bed, moving all four extremities spontaneously    No new recommendations from NS standpoint.  Agree with palliative care consult.    BELL Payton  [facility] Neurosurgery  [address] [address]    Tel [phone]  Pager [phone]

## 2023-08-01 NOTE — CONSULTS
"        [facility]    Stroke Progress Note    Interval EventsPatient with low grade fevers. Continues to be restless and in pain. Echo pending    HPI Summary  Iain Fuentes is a 52 year old male PMH of HTN, squamous cell carcinoma of posterior pharynx with radiation and chemotherapy   Patient comes in after he was found down in [employer] parking and found to be have R convexity SDH, non displaced skull fracture to L sphenoid. Subsequently he noted to be in sepsis. He was also noted to have posterior non-healing pharyngeal ulcer thought to be secondary to radiation. Further workup revealed C1 osteomyelitis epidural abscess C1-C7 encasing the cervical cord with bacteremia and sepsis.      On 07/29 RR activated for episodes of somnolence and apnea with intermittent yelling and hyperactivity.  EEG ordered for concern of seizure- completed-report pending. MRI brain completed revealing small areas of diffusion restriction in left  frontal lobe, left temporal lobe, right parietal lobe with an left lateral midbrain.      Impression   Traumatic R SDH  Cervical spine abscess  Multifocal infarcts suspected to be in setting of underlying endocarditis (positive bacteremia, pending echo)  ? Seizure (episodes of apnea and somnolence, SDH and current infection puts him at risk.    Plan  - Neurochecks and Vital Signs every 4h   - normotensive BP goal  - Telemetry  - Bedside Glucose Monitoring  - A1c, Lipid Panel, Troponin x 3  - PT/OT/SLP  - Euthermia, Euglycemia  - Follow TTE, may require GERSON  - Antibiotics per ID team  -Continue Keppra 500 BID    Patient Follow-up    - final recommendation pending work-up    We will continue to follow.     The Stroke Staff is Dr. Richards.    July Capone MD  Vascular Neurology Fellow    To page me or covering stroke neurology team member, click here: [employer]  Choose \"On Call\" tab at top, then select \"NEUROLOGY/ALL SITES\" from middle drop-down box, press Enter, then look " "for \"stroke\" or \"telestroke\" for your site.    ______________________________________________________    Clinically Significant Risk Factors        # Hypokalemia: Lowest K = 3.1 mmol/L in last 2 days, will replace as needed  # Hypernatremia: Highest Na = 146 mmol/L in last 2 days, will monitor as appropriate    # Hypomagnesemia: Lowest Mg = 1.4 mg/dL in last 2 days, will replace as needed   # Hypoalbuminemia: Lowest albumin = 2.3 g/dL at 7/30/2023  7:41 AM, will monitor as appropriate     # Hypertension: Noted on problem list        # Cachexia: Estimated body mass index is 16.62 kg/m  as calculated from the following:    Height as of this encounter: 1.676 m (5' 6\").    Weight as of this encounter: 46.7 kg (103 lb).   # Severe Malnutrition: based on nutrition assessment           Medications   Scheduled Meds    heparin  5-10 mL Intracatheter Q28 Days     heparin lock flush  5-10 mL Intracatheter Q24H     levETIRAcetam  500 mg Intravenous Q12H     sodium chloride (PF)  10-20 mL Intracatheter Q28 Days     sodium chloride (PF)  3 mL Intracatheter Q8H       Infusion Meds    [START ON 8/1/2023] sodium chloride 0.9%       sodium chloride 10 mL/hr at 07/31/23 1730       PRN Meds  acetaminophen, acetaminophen **OR** [DISCONTINUED] acetaminophen, artificial saliva, atropine, artificial tears, glycopyrrolate, haloperidol lactate, heparin lock flush, HYDROmorphone **OR** HYDROmorphone, HYDROmorphone **OR** HYDROmorphone, HYDROmorphone, HYDROmorphone, lidocaine 4%, lidocaine (buffered or not buffered), LORazepam **OR** LORazepam, naloxone **OR** naloxone **OR** naloxone **OR** naloxone, nitroGLYcerin, ondansetron **OR** ondansetron, sodium chloride (PF), sodium chloride (PF), sodium chloride (PF), [START ON 8/1/2023] sodium chloride 0.9%       PHYSICAL EXAMINATION  Temp:  [98.3  F (36.8  C)-101.3  F (38.5  C)] 101.3  F (38.5  C)  Pulse:  [] 109  Resp:  [16-20] 20  BP: (129-196)/() 170/119  SpO2:  [92 %-100 %] 100 % "      Limited exam to ensure patient's comfort    Mental Status:   alert to vocal stimuli, not following commands  Cranial Nerves:   EOMI intact, face appears symmetric, mostly mute or garbled speech.  Motor:   atleast antigravity in all four spontaneously  Sensory:  light touch sensation intact and symmetric throughout upper and lower extremities    Imaging  I personally reviewed all imaging; relevant findings per HPI.     Lab Results Data   CBC  Recent Labs   Lab 07/31/23  1044 07/30/23  1931 07/30/23  0741 07/29/23  0449   WBC 10.7  --  14.6* 18.2*   RBC 2.50*  --  2.15* 2.45*   HGB 7.8* 8.2* 6.9* 7.8*   HCT 23.2*  --  20.8* 23.2*   *  --  419 321     Basic Metabolic Panel    Recent Labs   Lab 07/31/23  1653 07/31/23  1044 07/30/23  0741 07/29/23 2116 07/29/23 2017 07/29/23  0450   NA  --  146* 146*  --   --  143   POTASSIUM 3.1* 3.2* 3.6  --   --  3.7   CHLORIDE  --  107 110*  --   --  108*   CO2  --  27 20*  --   --  20*   BUN  --  10.1 14.8  --   --  20.8*   CR  --  0.69 0.76  --   --  0.94   GLC  --  115* 91 70   < > 87   TOSHA  --  8.4* 8.7  --   --  8.8    < > = values in this interval not displayed.     Liver Panel  Recent Labs   Lab 07/30/23  0741 07/26/23  1313   PROTTOTAL 5.1* 5.5*   ALBUMIN 2.3* 2.6*   BILITOTAL 0.7 0.4   ALKPHOS 135* 241*   AST 20 26   ALT 14 25     INR    Recent Labs   Lab Test 08/23/22  1242   INR 0.96      Lipid Profile    Recent Labs   Lab Test 07/30/23  0741 02/25/21  0822 08/06/18  0936   CHOL 145 191 211*   HDL 16* 91 45   * 88 149*   TRIG 121 61 85     A1C    Recent Labs   Lab Test 07/29/23  0449   A1C 5.6     Troponin  No results for input(s): CTROPT, TROPONINIS, TROPONINI, GHTROP in the last 168 hours.

## 2023-08-01 NOTE — PROGRESS NOTES
[facility] Death Pronouncement    Called by nursing staff to pronounce Iain Fuentes dead.    Physical Exam: Unresponsive to noxious stimuli, Spontaneous respirations absent, Breath sounds absent, Carotid pulse absent, Heart sounds absent, Pupillary light reflex absent, and Corneal blink reflex absent    Patient was pronounced dead at 3:39 AM, 2023.    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 (examples: HIV, chicken pox, TB, Ebola, CJD) :  NO    Multi-drug resistant organism present? (example: MRSA): NO    Please consider an autopsy if any of the following exist:  NO Unexpected or unexplained death during or following any dental, medical, or surgical diagnostic treatment procedures.   NO Death of mother at or up to seven days after delivery.     NO All  and pediatric deaths.     NO Death where the cause is sufficiently obscure to delay completion of the death certificate.   NO Deaths in which autopsy would confirm a suspected illness/condition that would affect surviving family members or recipients of transplanted organs.     The following deaths must be reported to the 's Office:  YES A death that may be due entirely or in part to any factors other than natural disease (recent surgery, recent trauma, suspected abuse/neglect).   NO A death that may be an accident, suicide, or homicide.     NO Any sudden, unexpected death in which there is no prior history of significant heart disease or any other condition associated with sudden death.   NO A death under suspicious, unusual, or unexpected circumstances.    NO Any death which is apparently due to natural causes but in which the   does not have a personal physician familiar with the patient s medical history, social, or environmental situation or the circumstances of the terminal event.   NO Any death apparently due to Sudden Infant Death Syndrome.     NO Deaths that occur during, in association with, or as consequences of a diagnostic, therapeutic, or anesthetic procedure.   NO Any death in which a fracture of a major bone has occurred within the past (6) six months.   NO A death of persons not seen by their physician within 120 days of demise.     NO Any death in which the  was an inmate of a public institution or was in the custody of Law Enforcement personnel.   NO  All unexpected deaths of children   NO Solid organ donors   NO Unidentified bodies   Pending Deaths of persons whose bodies are to be cremated or otherwise disposed of so that the bodies will later be unavailable for examination;   NO Deaths unattended by a physician outside of a licensed healthcare facility or licensed residential hospice program   NO Deaths occurring within 24 hours of arrival to a health care facility if death is unexpected.    NO Deaths associated with the decedent s employment.   NO Deaths attributed to acts of terrorism.   NO Any death in which there is uncertainty as to whether it is a medical examiner s care should be discussed with the medical investigator.      Death Certificate to be directed to Dr. Alexus Lawton, hospitalist    Body disposition: Body released to the morgue.    PARIS Butterfield, CNP  Hospitalist-[facility]  Hospitalist Service  Securely message with [employer] (more info)  Text page via [facility] Paging/Directory

## 2023-08-02 LAB
BACTERIA BLD CULT: NO GROWTH
BACTERIA BLD CULT: NO GROWTH

## 2023-08-21 NOTE — DISCHARGE SUMMARY
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 Upon admission he was found to have a SDH and later developed signs of sepsis. Patient's recent medical history was unclear on admission due to him being a poor historian. By chart review he followed with ENT 05/2023. His cancer was considered resolved. 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 It does not appear patient ever had that done. His chart notes that he presented to his PCP and ortho clinic for ongoing neck pain (no discussion of his ulcer noted) He next presented to [facility] via EMS with this fall. His PEG tube was removed at some point, appeared very malnourished with at least 10 pound weight loss in 2-3 months. Events of the last 2-3 months unclear.      He was treated for below listed medical conditions, but given ongoing pain, encephalopathy, dysphagia with potential need for PEG tube, but with overall very poor prognosis, eventually after multiple discussion with his brother, care was transitioned to comfort measures and he passed away.      Unwitnessed fall, suspect syncope in the setting of overwhelming sepsis  Subdural hematoma with Brain compression/ 1 to  2 mm leftward midline shift of the septum pellucidum   Nondisplaced skull fracture to the L sphenoid and R maxillary sinus   Dialister Bacteremia and Sepsis  Epidural abscess, C1-C7, encasing the cervical cord  Pharyngeal wall ulceration extending to vertebra, ? infected tumour  C1 osteomyelitis  Suspected Endocarditis, negative findings on TTE, GERSON not done, transitioned to comfort measures    Possible multifocal stroke, suspected due to endocarditis  Acute encephalopathy, multifactorial, due to sepsis, SDH, stroke   Squamous cell carcinoma of posterior pharynx  Severe malnutrition  Dysphagia   Acute kidney injury  Hypernatremia   Acute anemia on chronic  Folate deficiency    Hypokalemia  Hypomagnesia   Essential hypertension       Alexus Lawton MD  [facility]  ______________________________________________________________________      Significant Results and Procedures   Most Recent 3 CBC's:  Recent Labs   Lab Test 07/31/23  1044 07/30/23  1931 07/30/23  0741 07/29/23  0449   WBC 10.7  --  14.6* 18.2*   HGB 7.8* 8.2* 6.9* 7.8*   MCV 93  --  97 95   *  --  419 321     Most Recent 3 BMP's:  Recent Labs   Lab Test 07/31/23  1653 07/31/23  1044 07/30/23  0741 07/29/23  2116 07/29/23  2017 07/29/23  0450   NA  --  146* 146*  --   --  143   POTASSIUM 3.1* 3.2* 3.6  --   --  3.7   CHLORIDE  --  107 110*  --   --  108*   CO2  --  27 20*  --   --  20*   BUN  --  10.1 14.8  --   --  20.8*   CR  --  0.69 0.76  --   --  0.94   ANIONGAP  --  12 16*  --   --  15   TOSHA  --  8.4* 8.7  --   --  8.8   GLC  --  115* 91 70   < > 87    < > = values in this interval not displayed.     Most Recent 2 LFT's:  Recent Labs   Lab Test 07/30/23  0741 07/26/23  1313   AST 20 26   ALT 14 25   ALKPHOS 135* 241*   BILITOTAL 0.7 0.4     7-Day Micro Results       No results found for the last 168 hours.          Most Recent TSH and T4:  Recent Labs   Lab Test 07/28/23  1140   TSH 1.26     Most Recent 6  glucoses:  Recent Labs   Lab Test 07/31/23  1044 07/30/23  0741 07/29/23  2116 07/29/23  2017 07/29/23  0450 07/29/23  0349   * 91 70 76 87 73     Most Recent Urinalysis:  Recent Labs   Lab Test 07/26/23  1543   COLOR Yellow   APPEARANCE Clear   URINEGLC Negative   URINEBILI Negative   URINEKETONE Negative   SG 1.013   UBLD Small*   URINEPH 5.5   PROTEIN 50*   NITRITE Negative   LEUKEST Negative   RBCU 2   WBCU 4     Most Recent ESR & CRP:  Recent Labs   Lab Test 07/31/23  1044 07/28/23  2250   SED  --  96*   CRPI 174.73* 283.40*     Most Recent Anemia Panel:  Recent Labs   Lab Test 07/31/23  1044 07/29/23  0449 07/28/23  0353 07/28/23  0331 07/27/23  0843   WBC 10.7   < >  --  13.6* 12.6*   HGB 7.8*   < >  --  7.6* 8.0*   HCT 23.2*   < >  --  22.5* 24.0*   MCV 93   < >  --  95 95   *   < >  --  266 333   IRON  --   --   --   --  14*   IRONSAT  --   --   --   --  10*   RETICABSCT  --   --   --  0.012* 0.012*   RETP  --   --   --  0.5 0.5   FEB  --   --   --   --  138*   ROSSY  --   --   --   --  1,085*   B12  --   --   --   --  1,018   FOLIC  --   --  4.0*  --   --     < > = values in this interval not displayed.   ,   Results for orders placed or performed during the hospital encounter of 07/26/23   Head CT w/o contrast     Value    Radiologist flags Intracranial hemorrhage and skull fracture (AA)    Narrative    CT OF THE HEAD WITHOUT CONTRAST  7/26/2023 3:41 PM     COMPARISON: None.    HISTORY: Fall, head trauma, confusion.    TECHNIQUE: 5 mm thick axial CT images of the head were acquired  without IV contrast material.    FINDINGS: There is a thin lobulated hyperdense subdural hemorrhage  along the lateral right frontal convexity measuring up to 1 cm maximum  thickness. Given the patient's cerebral volume loss, this does not  result in any significant effacement of the basal cisterns but does  result in 1-2 mm leftward midline shift of the septum pellucidum.  There is mild diffuse cerebral volume  loss. There are subtle patchy  areas of decreased density in the cerebral white matter bilaterally  that are consistent with sequela of chronic small vessel ischemic  disease. The ventricles and basal cisterns are within normal limits in  configuration given the degree of cerebral volume loss.     No intracranial mass or recent infarct.    The visualized paranasal sinuses are well-aerated. There is no  mastoiditis. There is a nondisplaced fracture of the lateral aspect of  the greater sphenoid wing on the left extending up into the squamosal  portion of the left temporal bone. There is gas in the inferotemporal  fossa on the right of uncertain origin. There is a questionable  fracture through the mastoid portion of the temporal bone on the right  on the coronal images that could be associated with this soft tissue  gas.      Impression    IMPRESSION:   1. Thin subdural hemorrhage along the right frontal convexity  measuring up to 1 cm maximum thickness resulting in 1-2 mm leftward  midline shift of the septum pellucidum.  2. Nondisplaced fracture through the greater wing of the sphenoid on  the left extending up into the squamosal portion of the left temporal  bone.  3. Questionable fracture through the mastoid portion of the temporal  bone on the right. Dedicated thin section skull base CT recommended  for further evaluation.  4. Diffuse cerebral volume loss and cerebral white matter changes  consistent with chronic small vessel ischemic disease.     [Critical Result: Intracranial hemorrhage and skull fracture]    Finding was identified on 7/26/2023 3:45 PM.     LOLIS CLAYTON OSCAR was contacted by Dr. Casanova on 7/26/2023 3:58 PM  and verbalized understanding of the critical result.         Radiation dose for this scan was reduced using automated exposure  control, adjustment of the mA and/or kV according to patient size, or  iterative reconstruction technique.    BOBBI CASANOVA MD         SYSTEM ID:  [id number]    Cervical spine CT w/o contrast    Narrative    CT OF THE CERVICAL SPINE WITHOUT CONTRAST   7/26/2023 3:42 PM     COMPARISON: None    HISTORY: Fall, midline C-spine pain.     TECHNIQUE: Axial images of the cervical spine were acquired without  intravenous contrast. Multiplanar reformations were created.        Impression    IMPRESSION: Mild degenerative anterolisthesis of C2 upon C3 and C3  upon C4. Mild degenerative retrolisthesis of C4 upon C5. Alignment of  the cervical vertebrae is otherwise normal; however, there is reversal  of normal cervical lordosis centered at the T4 level. Vertebral body  heights of the cervical spine are normal. Craniocervical alignment is  normal. There are no fractures of the cervical spine. Loss of disc  space height and degenerative endplate spurring throughout the  cervical spine. Mild to moderate facet arthropathy throughout the  cervical spine. No high-grade spinal canal stenosis.      Radiation dose for this scan was reduced using automated exposure  control, adjustment of the mA and/or kV according to patient size, or  iterative reconstruction technique    BOBBI SELBY MD         SYSTEM ID:  [id number]   Chest XR,  PA & LAT    Narrative    XR CHEST 2 VIEWS   7/26/2023 3:24 PM     HISTORY: fever, sepsis    COMPARISON: None.      Impression    IMPRESSION: Normal size of cardiac silhouette. Right chest port with  tip overlying the cavoatrial junction. No definite airspace  consolidation, pleural effusion or pneumothorax. Tubular opacity  overlying the right axilla/chest wall, likely external to the patient.    KATHARINE WALKER MD         SYSTEM ID:  [id number]   CT Head w/o Contrast    Narrative    EXAM: CT HEAD W/O CONTRAST  LOCATION: [facility]  DATE: 7/26/2023    INDICATION: Known R subdural hemorrhage, 6 hr scan.  COMPARISON: 07/26/2023 head CT  TECHNIQUE: Routine CT Head without IV contrast. Multiplanar reformats. Dose reduction techniques were  used.    FINDINGS:  INTRACRANIAL CONTENTS: Right convexity subdural hematoma is overall stable in thickness apart from question subtle increase anteriorly along the right frontal lobe. No significant mass effect or midline shift. No new hemorrhage.  No CT evidence of acute   infarct. Normal parenchymal attenuation. Normal ventricles and sulci.     VISUALIZED ORBITS/SINUSES/MASTOIDS: No intraorbital abnormality.    Left maxillary sinus fluid levels slightly increased compared to the prior study. No middle ear or mastoid effusion.    BONES/SOFT TISSUES: No acute abnormality.      Impression    IMPRESSION:  1.  Question subtle increase in thickness of the right convexity subdural hematoma anteriorly. Remainder of the subdural hematoma is stable in thickness with mild increase in density.    2.  No significant mass effect or new hemorrhage.    3.  Mild increase in paranasal sinus fluid levels.   CT Head w/o Contrast    Narrative    CT HEAD WITHOUT CONTRAST  7/27/2023 12:19 PM     HISTORY: SDH       COMPARISON: 7/26/2023    TECHNIQUE: Using multidetector thin collimation helical acquisition  technique, axial, coronal and sagittal CT images from the skull base  to the vertex were obtained without intravenous contrast.   (topogram) image(s) also obtained and reviewed.    FINDINGS:  Not substantially changed right hyperdense extra-axial fluid  collection measuring 6 mm over the right frontal and temporal  convexities. No substantial mass effect, or midline shift. No acute  loss of gray-white matter differentiation. Ventricles are  proportionate to the cerebral sulci. Clear basal cisterns.    Hyperdense debris in the left sphenoid locule, otherwise clear.  Paranasal sinuses, mastoid air cells. Nonfocal orbits.       Impression    IMPRESSION: No substantial change in right subdural hyperdense  hematoma. No mass effect. No CT findings of acute hydrocephalus.    AKLEN FOLEY DO         SYSTEM ID:  [id number]   CT Temporal  Bones wo Contrast    Narrative    EXAM: CT TEMPORAL W/O CONTRAST  LOCATION: [facility]  DATE: 7/27/2023    INDICATION: head injury  COMPARISON: None.  TECHNIQUE:  Routine without contrast including dedicated thin section multiplanar reformats of each temporal bone. Dose reduction techniques were used.    FINDINGS:  RIGHT TEMPORAL BONE: Normal external auditory canal and tympanic membrane. Normal middle ear cavity and ossicles. No mastoid effusion. No evidence for otosclerosis. Normal cochlea, semicircular canals, vestibule, and vestibular aqueduct. Intact bony   carotid canal and facial nerve canal.     LEFT TEMPORAL BONE: Normal external auditory canal and tympanic membrane. Normal middle ear cavity and ossicles. Small effusion dependent left mastoid air cells. No evidence for otosclerosis. Normal cochlea, semicircular canals, vestibule, and vestibular   aqueduct. Intact bony carotid canal and facial nerve canal.     OTHER: The visualized intracranial compartment is unremarkable. Small foci of soft tissue air in the right infratemporal fossa. Redemonstrated fracture left sphenoid bone greater and lesser wings of the skull base.      Impression    IMPRESSION:  1.  No temporal bone fracture.   CT Facial Bones without Contrast    Narrative    EXAM: CT FACIAL BONES WITHOUT CONTRAST  LOCATION: [facility]  DATE: 7/27/2023    INDICATION: eval for further facial fractures  COMPARISON: CT head 07/26/2023  TECHNIQUE: Routine CT Maxillofacial without IV contrast. Multiplanar reformats. Dose reduction techniques were used.     FINDINGS:  OSSEOUS STRUCTURES/SOFT TISSUES: No localized soft tissue swelling/inflammation. Subtle fracture anterior wall right maxillary sinus. No evidence for dental trauma or periapical abscess.    ORBITAL CONTENTS: No acute abnormality.    SINUSES: There is air-fluid levels bilateral maxillary sinuses. Mild mucosal thickening scattered about the  paranasal sinuses.    VISUALIZED INTRACRANIAL CONTENTS: Partially visualized right convexity subdural hematoma. Redemonstrated nondisplaced fracture left greater wing of sphenoid extending inferiorly and medially into the lesser wing of sphenoid, traversing the left inferior   orbital fissure.      Impression    IMPRESSION:   1.  Fracture anterior wall right maxillary sinus.     CT Head w/o Contrast    Narrative    EXAM: CT HEAD W/O CONTRAST  LOCATION: [facility]  DATE: 7/28/2023    INDICATION: Follow-up intracranial hemorrhage.  COMPARISON: 07/27/2023  TECHNIQUE: Routine CT Head without IV contrast. Multiplanar reformats. Dose reduction techniques were used.    FINDINGS:  INTRACRANIAL CONTENTS: Question subtle increased along the anterior aspect of the right-sided subdural hemorrhage. No midline shift or effacement of basal cisterns. No CT evidence of acute infarct. Normal parenchymal attenuation. Normal ventricles and   sulci.     VISUALIZED ORBITS/SINUSES/MASTOIDS: No intraorbital abnormality. Mild mucosal thickening scattered about the paranasal sinuses. Small left mastoid effusion.    BONES/SOFT TISSUES: Demonstrated left sphenoid wing and temporal bone fracture.      Impression    IMPRESSION:  1.  Question subtle increased volume of subdural hemorrhage along the anterior right cerebral hemisphere without a significant change in thickness or associated mass effect.   CT Soft Tissue Neck w Contrast    Narrative    CT SCAN OF THE NECK WITH CONTRAST  7/28/2023 10:31 AM     HISTORY: History of pharyngeal cancer, now septic.    TECHNIQUE: Axial CT images of the neck following administration of  intravenous contrast with reformations. Radiation dose for this scan  was reduced using automated exposure control, adjustment of the mA  and/or kV according to patient size, or iterative reconstruction  technique. 70 mL Isovue-370 IV.     COMPARISON: Neck CT 5/31/2023.    FINDINGS:      Ulceration  involving the posterior pharyngeal wall appears deeper  compared to the prior exam, now extending to the anterior arch of the  C1 ring. Soft tissue swelling surrounding the ulcer has worsened  compared to the prior exam. Extensive soft tissue swelling and  inflammatory change throughout the deep spaces of the neck involving  the prevertebral/retropharyngeal space and bilateral carotid spaces.    Increasing epidural fluid attenuation contributing to spinal canal  stenosis.    Increased soft tissue swelling and inflammatory change involving the  bilateral neck musculature, most conspicuous involving the posterior  paraspinal musculature with areas of heterogeneous enhancement.    The oral cavity, oropharynx, hypopharynx, and larynx otherwise  demonstrate posttreatment changes as before.    The salivary glands demonstrate posttreatment changes and are  otherwise unremarkable. The thyroid gland is unremarkable. Major neck  vasculature appears patent with scattered atherosclerotic plaquing.  Possible fluid collection along the left C1 transverse process and  vertebral artery V3 segment (series 3 image 27).    Known facial fractures/intracranial hemorrhage, incompletely  characterized. Superimposed cervical spine degenerative changes.  Presumed chronic scarring and atelectasis at the lung apices.      Impression    IMPRESSION:  1. Ulceration involving the left posterior pharyngeal wall extending  to the C1 ring, questionably deeper compared to the prior neck CT from  5/31/2023.  2. Focal erosion involving the right anterior C1 ring raising concern  for osteomyelitis, new compared to 5/31/2023.  3. Marked worsening of associated soft tissue swelling and  inflammatory change involving the pharynx, prevertebral space,  bilateral carotid space, and bilateral paraspinal musculature,  concerning for multifocal deep space infection.  4. Increasing epidural fluid attenuation raising concern for epidural  phlegmon/abscess.  5.  Possible fluid collection along the left C1 transverse  process/vertebral artery V3 segment.  6. Neck muscle swelling and inflammatory change can also be seen in  the setting of rhabdomyolysis.  7. Recommend cervical spine MRI with and without contrast.    Findings were discussed by phone between Dr. Corona and Dr. Meza  at approximately 1:05 PM on 7/28/2023.    PINKY CORONA MD         SYSTEM ID:  [id number]   CT Head w/o Contrast    Narrative    EXAM: CT HEAD W/O CONTRAST  LOCATION: [facility]  DATE: 7/28/2023    INDICATION: CT brain 7/28/2023.    COMPARISON: CT brain 7/28/2023 at 0540 hours.    TECHNIQUE: Routine CT head without IV contrast. Multiplanar reformats. Dose reduction techniques were used.    FINDINGS:  INTRACRANIAL CONTENTS: Stable high-attenuation subdural hematomas again seen extending along the right frontal parietal and temporal inner table. The collection measures up to 7 mm in thickness in the coronal plane on both the current and prior. There is   associated mass effect with subtle leftward bowing of the septum pellucidum, as seen previously. Gray-white matter differentiation is preserved. Mild prominence of the lateral ventricles. Cerebellar tonsils are normally positioned. Sella is unremarkable   for technique. Corpus callosum is normally formed.    VISUALIZED ORBITS/SINUSES/MASTOIDS: Orbits are normal in appearance. Dependent fluid is seen within the left maxillary and both sphenoid sinuses compatible with active sinus disease or layering blood products. Mild ethmoid and left frontal sinus mucosal   thickening. Middle ear cavities and mastoid air cells are clear.    BONES/SOFT TISSUES: Stable appearance of nondisplaced left temporal fracture extending superiorly along the left frontal sphenoid synchondrosis.      Impression    IMPRESSION: Stable appearance of hyperattenuating right frontal parietal and temporal subdural hematoma. No finding for interval  hemorrhage.     MR Brain w/o & w Contrast    Narrative    MRI BRAIN AND CERVICAL SPINE: WITHOUT AND WITH CONTRAST  7/28/2023  4:05 PM    HISTORY: Subdural hematoma, head injury, fevers, abnormal neck CT    TECHNIQUE:  Multiplanar, multisequence MRI of the brain without and  with 5mL Gadavist    COMPARISON: Head CT 7/28/2023    FINDINGS:    BRAIN MRI:  Acute subdural hematoma along the right convexity with mild mass  effect, not appreciably changed.    Multiple small areas of diffusion restriction are present involving  the left frontal subcortical white matter. Small area of diffusion  restriction involving the left lateral temporal lobe. Small areas of  diffusion restriction involving the right parietal cortex. Suspected  small area of diffusion resection involving the left lateral midbrain.    T2 gradient echo imaging demonstrates subtle areas of susceptibility  related signal loss corresponding to diffusion restriction within the  left frontal white matter and involving the right parietal cortex.    Background of mild generalized parenchymal volume loss is present with  areas of white matter T2 hyperintense signal which likely represent  mild chronic small vessel ischemic change. Old periventricular infarct  along the frontal horn of the left lateral ventricle.    Abnormal marrow signal involving the upper cervical spine (refer to  cervical spine report). Mild paranasal sinus mucosal thickening. Fluid  signal within the left frontal and mastoid cavities, presumably  inflammatory. Soft tissue swelling with edema and enhancement  involving the visualized posterior upper neck musculature (refer to  cervical spine report).    CERVICAL SPINE MRI:    Posterior pharyngeal wall ulceration extending to the C1 ring.    Abnormal T1 hypointense signal, T2 hyperintense signal, and  enhancement within the C1 vertebra (right worse than left) with  cortical erosion consistent with osteomyelitis.    Widespread abnormal soft  tissue swelling and fluid/edema throughout  the deep spaces of the neck involving the prevertebral space,  bilateral carotid spaces, parapharyngeal spaces, and extensively the  paraspinal musculature, particularly the posterior paraspinal  musculature. Prevertebral fluid and appears ill-defined with  heterogeneous marginal enhancement (drainability uncertain).  Prevertebral fluid signal extends to the level of approximately T1-T2  at the superior mediastinum.    Spinal canal epidural abscess is present extending from C1 through  approximately C7 which is thickest at the level of C2 (series 12 image  10). The abscess encases the cervical cord and contributes to moderate  spinal canal stenoses. No convincing cord signal signal abnormality.    Superimposed degenerative changes are present contributing to spinal  canal stenoses at multiple levels. Multilevel moderate to severe  degenerative disease and facet arthropathy. Severe foraminal stenoses  at multiple levels. Alignment is significant for reversal of normal  cervical lordosis and multilevel grade 1 spondylolisthesis.    The visualized oral cavity, oropharynx, larynx, and salivary glands  demonstrate posttreatment changes.      Impression    IMPRESSION:    BRAIN MRI:   1. Acute subdural hematoma along the right convexity with mild mass  effect, unchanged compared to head CT from 7/28/2023.  2. Multiple small areas of diffusion restriction involving the left  frontal lobe, left temporal lobe, right parietal lobe with an left  lateral midbrain corresponding susceptibility related signal loss in  the left frontal lobe and right parietal lobe raising concern for  traumatic diffuse axonal injury. Subtle infarcts with petechial  hemorrhage not excluded. The right parietal lesions could represent  cortical contusions.  3. 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    CERVICAL SPINE MRI:  1.  Posterior  pharyngeal wall ulceration extending to the C1 vertebra  with marrow signal changes involving the C1 vertebra consistent with  osteomyelitis.  2. Epidural abscess extending from C1 through C7 thickest at C2  encasing the cervical cord. No convincing cord signal abnormalities.  3. Widespread fluid stranding enhancement throughout the deep spaces  of the neck, concerning for extensive multicompartment infection.  4. The soft tissue changes extensively involve the posterior  paraspinal musculature, and sequela of superimposed rhabdomyolysis  cannot be excluded which can be drug-induced.  5. Superimposed traumatic muscular/ligamentous strain cannot be  excluded.    PINKY ENGLAND MD         SYSTEM ID:  [id number]   XR Chest 2 Views    Narrative    CHEST TWO VIEWS  7/28/2023 12:44 PM     HISTORY: MRI checklist.    COMPARISON: July 26, 2023       Impression    IMPRESSION: Needle is accessing the port, otherwise no metallic  foreign bodies demonstrated. Lungs are clear. There are no acute  infiltrates. The cardiac silhouette is not enlarged. Pulmonary  vasculature is unremarkable.     LEWIS IQBAL MD         SYSTEM ID:  [id number]   XR Abdomen 1 View    Narrative    ABDOMEN ONE VIEW  7/28/2023 12:45 PM     HISTORY: MRI check list.    COMPARISON: None.       Impression    IMPRESSION: No radiopaque foreign body demonstrated, portions of the  pelvis are obscured by contrast in the bladder.    LEWIS IQBAL MD         SYSTEM ID:  [id number]   MR Cervical Spine w/o & w Contrast    Narrative    MRI BRAIN AND CERVICAL SPINE: WITHOUT AND WITH CONTRAST  7/28/2023  4:05 PM    HISTORY: Subdural hematoma, head injury, fevers, abnormal neck CT    TECHNIQUE:  Multiplanar, multisequence MRI of the brain without and  with 5mL Gadavist    COMPARISON: Head CT 7/28/2023    FINDINGS:    BRAIN MRI:  Acute subdural hematoma along the right convexity with mild mass  effect, not appreciably changed.    Multiple small areas of diffusion restriction  are present involving  the left frontal subcortical white matter. Small area of diffusion  restriction involving the left lateral temporal lobe. Small areas of  diffusion restriction involving the right parietal cortex. Suspected  small area of diffusion resection involving the left lateral midbrain.    T2 gradient echo imaging demonstrates subtle areas of susceptibility  related signal loss corresponding to diffusion restriction within the  left frontal white matter and involving the right parietal cortex.    Background of mild generalized parenchymal volume loss is present with  areas of white matter T2 hyperintense signal which likely represent  mild chronic small vessel ischemic change. Old periventricular infarct  along the frontal horn of the left lateral ventricle.    Abnormal marrow signal involving the upper cervical spine (refer to  cervical spine report). Mild paranasal sinus mucosal thickening. Fluid  signal within the left frontal and mastoid cavities, presumably  inflammatory. Soft tissue swelling with edema and enhancement  involving the visualized posterior upper neck musculature (refer to  cervical spine report).    CERVICAL SPINE MRI:    Posterior pharyngeal wall ulceration extending to the C1 ring.    Abnormal T1 hypointense signal, T2 hyperintense signal, and  enhancement within the C1 vertebra (right worse than left) with  cortical erosion consistent with osteomyelitis.    Widespread abnormal soft tissue swelling and fluid/edema throughout  the deep spaces of the neck involving the prevertebral space,  bilateral carotid spaces, parapharyngeal spaces, and extensively the  paraspinal musculature, particularly the posterior paraspinal  musculature. Prevertebral fluid and appears ill-defined with  heterogeneous marginal enhancement (drainability uncertain).  Prevertebral fluid signal extends to the level of approximately T1-T2  at the superior mediastinum.    Spinal canal epidural abscess is present  extending from C1 through  approximately C7 which is thickest at the level of C2 (series 12 image  10). The abscess encases the cervical cord and contributes to moderate  spinal canal stenoses. No convincing cord signal signal abnormality.    Superimposed degenerative changes are present contributing to spinal  canal stenoses at multiple levels. Multilevel moderate to severe  degenerative disease and facet arthropathy. Severe foraminal stenoses  at multiple levels. Alignment is significant for reversal of normal  cervical lordosis and multilevel grade 1 spondylolisthesis.    The visualized oral cavity, oropharynx, larynx, and salivary glands  demonstrate posttreatment changes.      Impression    IMPRESSION:    BRAIN MRI:   1. Acute subdural hematoma along the right convexity with mild mass  effect, unchanged compared to head CT from 7/28/2023.  2. Multiple small areas of diffusion restriction involving the left  frontal lobe, left temporal lobe, right parietal lobe with an left  lateral midbrain corresponding susceptibility related signal loss in  the left frontal lobe and right parietal lobe raising concern for  traumatic diffuse axonal injury. Subtle infarcts with petechial  hemorrhage not excluded. The right parietal lesions could represent  cortical contusions.  3. 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    CERVICAL SPINE MRI:  1.  Posterior pharyngeal wall ulceration extending to the C1 vertebra  with marrow signal changes involving the C1 vertebra consistent with  osteomyelitis.  2. Epidural abscess extending from C1 through C7 thickest at C2  encasing the cervical cord. No convincing cord signal abnormalities.  3. Widespread fluid stranding enhancement throughout the deep spaces  of the neck, concerning for extensive multicompartment infection.  4. The soft tissue changes extensively involve the posterior  paraspinal musculature, and  sequela of superimposed rhabdomyolysis  cannot be excluded which can be drug-induced.  5. Superimposed traumatic muscular/ligamentous strain cannot be  excluded.    PNIKY ENGLAND MD         SYSTEM ID:  [id number]   CT Head w/o Contrast    Narrative    EXAM: CT HEAD WITHOUT CONTRAST  LOCATION: [facility]  DATE: 2023    INDICATION: Increased pain and agitation, unchanged with analgesia.  COMPARISON: CT head 2023.  TECHNIQUE: Routine CT Head without IV contrast. Multiplanar reformats. Dose reduction techniques were used.    FINDINGS:  Right convexity subdural hematoma is stable to subtly decreased in thickness. No new hemorrhage or mass effect. Volume loss and presumed chronic small vessel ischemia is stable. No new hemorrhage. No acute infarct. Remainder unchanged.       Impression    IMPRESSION:  1. Stable to subtle decrease in right convexity subdural hemorrhage.     Echocardiogram Limited     Value    LVEF  60-65%    Narrative    [id number]  [id number]  [id number]  [id number]^LAURA^ARIANNA     [facility]  Echocardiography Laboratory  [address] [place] [address]     Name: LOIDA BRAVO  MRN: [id number]  : 1970  Study Date: 2023 10:07 AM  Age: 52 yrs  Gender: Male  Patient Location: [facility]  Reason For Study: Arrhythmia, Other, Please Specify in Comments  Ordering Physician: ARIANNA SANCHEZ  Referring Physician: Abdiel Kelly  Performed By: Brooke Mitchell     BSA: 1.5 m2  Height: 66 in  Weight: 103 lb  HR: 107  BP: 144/95 mmHg  ______________________________________________________________________________  Procedure  Limited Portable Echo Adult.  ______________________________________________________________________________  Interpretation Summary     1. Normal biventricular size and function. Left ventricular ejection fraction  of 60-65%.  2. No segmental wall motion abnormalities noted.  3. No hemodynamically significant valvular disease on  limited assessment.  No prior study for comparison.  ______________________________________________________________________________  Left Ventricle  The left ventricle is normal in size. Left ventricular systolic function is  normal. The visual ejection fraction is 60-65%. Abnormal/paradoxical septal  motion. No regional wall motion abnormalities noted.     Right Ventricle  The right ventricle is normal in size and function.     Atria  Normal left atrial size. Right atrial size is normal.     Mitral Valve  The mitral valve leaflets appear normal. There is no evidence of stenosis,  fluttering, or prolapse. There is trace mitral regurgitation.     Tricuspid Valve  There is trace tricuspid regurgitation.     Aortic Valve  The aortic valve is not well visualized.     Pulmonic Valve  The pulmonic valve is not well visualized.     Vessels  IVC diameter <2.1 cm collapsing >50% with sniff suggests a normal RA pressure  of 3 mmHg.     ______________________________________________________________________________  MMode/2D Measurements & Calculations  IVSd: 0.68 cm  LVIDd: 4.0 cm  LVIDs: 2.7 cm  LVPWd: 0.87 cm  FS: 33.5 %  LV mass(C)d: 89.3 grams  LV mass(C)dI: 59.2 grams/m2  RWT: 0.43     ______________________________________________________________________________  Report approved by: Debbie Dia 07/31/2023 03:09 PM             Consultations This Hospital Stay   PHARMACY TO DOSE VANCO  ENT IP CONSULT  NEUROSURGERY IP CONSULT  ENT IP CONSULT  HEMATOLOGY & ONCOLOGY IP CONSULT  ENT IP CONSULT  SPEECH LANGUAGE PATH ADULT IP CONSULT  VASCULAR ACCESS ADULT IP CONSULT  INFECTIOUS DISEASES IP CONSULT  PHARMACY TO DOSE VANCO  NEUROLOGY IP STROKE CONSULT  PAIN MANAGEMENT ADULT IP CONSULT  INTERVENTIONAL RADIOLOGY ADULT/PEDS IP CONSULT  NUTRITION SERVICES ADULT IP CONSULT  PHARMACY IP CONSULT  SPIRITUAL HEALTH SERVICES IP CONSULT  CARE MANAGEMENT / SOCIAL WORK IP CONSULT  [facility] ADULT CONSULT    Primary Care  Physician   Abdiel Kelly    Time Spent on this Encounter   I, Alexus Lawton MD, discharged this patient today but I did not personally see the patient today and will not be billing for the patient's discharge.

## (undated) DEVICE — LIGHT HANDLE X2

## (undated) DEVICE — ESU ELEC NDL 1" COATED/INSULATED E1465

## (undated) DEVICE — LINEN HALF SHEET 5512

## (undated) DEVICE — BLADE KNIFE SURG 15 371115

## (undated) DEVICE — GLOVE PROTEXIS W/NEU-THERA 8.5  2D73TE85

## (undated) DEVICE — ESU SUCTION CAUTERY 10FR FOOT CONTROL E2505-10FR

## (undated) DEVICE — PACK T&A RIDGES

## (undated) DEVICE — LINEN TOWEL PACK X10 5473

## (undated) DEVICE — SUCTION CANISTER MEDIVAC LINER 3000ML W/LID 65651-530

## (undated) DEVICE — ESU CORD BIPOLAR GREEN 10-4000

## (undated) DEVICE — LINEN FULL SHEET 5511

## (undated) DEVICE — SPONGE TONSIL W/STRING MED D30-037

## (undated) DEVICE — SPONGE RAY-TEC 4X8" 7318

## (undated) DEVICE — LABEL MEDICATION SYSTEM 3303-P

## (undated) DEVICE — SOL NACL 0.9% IRRIG 1000ML BOTTLE 2F7124

## (undated) DEVICE — ESU PENCIL W/HOLSTER E2350H

## (undated) DEVICE — ESU GROUND PAD ADULT W/CORD E7507

## (undated) RX ORDER — GLYCOPYRROLATE 0.2 MG/ML
INJECTION INTRAMUSCULAR; INTRAVENOUS
Status: DISPENSED
Start: 2022-07-15

## (undated) RX ORDER — EPINEPHRINE 1 MG/ML(1)
AMPUL (ML) INJECTION
Status: DISPENSED
Start: 2022-07-15

## (undated) RX ORDER — FENTANYL CITRATE-0.9 % NACL/PF 10 MCG/ML
PLASTIC BAG, INJECTION (ML) INTRAVENOUS
Status: DISPENSED
Start: 2022-07-15

## (undated) RX ORDER — CEFAZOLIN SODIUM/WATER 2 G/20 ML
SYRINGE (ML) INTRAVENOUS
Status: DISPENSED
Start: 2022-07-15

## (undated) RX ORDER — PROPOFOL 10 MG/ML
INJECTION, EMULSION INTRAVENOUS
Status: DISPENSED
Start: 2022-07-15

## (undated) RX ORDER — FENTANYL CITRATE 50 UG/ML
INJECTION, SOLUTION INTRAMUSCULAR; INTRAVENOUS
Status: DISPENSED
Start: 2022-07-15

## (undated) RX ORDER — DEXAMETHASONE SODIUM PHOSPHATE 4 MG/ML
INJECTION, SOLUTION INTRA-ARTICULAR; INTRALESIONAL; INTRAMUSCULAR; INTRAVENOUS; SOFT TISSUE
Status: DISPENSED
Start: 2022-07-15

## (undated) RX ORDER — ONDANSETRON 2 MG/ML
INJECTION INTRAMUSCULAR; INTRAVENOUS
Status: DISPENSED
Start: 2022-07-15

## (undated) RX ORDER — EPINEPHRINE IN SOD CHLOR,ISO 1 MG/10 ML
SYRINGE (ML) INTRAVENOUS
Status: DISPENSED
Start: 2022-07-15

## (undated) RX ORDER — LIDOCAINE HYDROCHLORIDE AND EPINEPHRINE BITARTRATE 20; .01 MG/ML; MG/ML
INJECTION, SOLUTION SUBCUTANEOUS
Status: DISPENSED
Start: 2022-07-15

## (undated) RX ORDER — SODIUM CHLORIDE 9 MG/ML
INJECTION, SOLUTION INTRAVENOUS
Status: DISPENSED
Start: 2022-01-01

## (undated) RX ORDER — LIDOCAINE HYDROCHLORIDE 10 MG/ML
INJECTION, SOLUTION EPIDURAL; INFILTRATION; INTRACAUDAL; PERINEURAL
Status: DISPENSED
Start: 2022-01-01

## (undated) RX ORDER — HEPARIN SODIUM (PORCINE) LOCK FLUSH IV SOLN 100 UNIT/ML 100 UNIT/ML
SOLUTION INTRAVENOUS
Status: DISPENSED
Start: 2022-01-01

## (undated) RX ORDER — LIDOCAINE HYDROCHLORIDE 10 MG/ML
INJECTION, SOLUTION EPIDURAL; INFILTRATION; INTRACAUDAL; PERINEURAL
Status: DISPENSED
Start: 2022-07-15

## (undated) RX ORDER — EPINEPHRINE NASAL SOLUTION 1 MG/ML
SOLUTION NASAL
Status: DISPENSED
Start: 2022-07-15

## (undated) RX ORDER — FENTANYL CITRATE 50 UG/ML
INJECTION, SOLUTION INTRAMUSCULAR; INTRAVENOUS
Status: DISPENSED
Start: 2022-01-01